# Patient Record
Sex: FEMALE | Race: ASIAN | NOT HISPANIC OR LATINO
[De-identification: names, ages, dates, MRNs, and addresses within clinical notes are randomized per-mention and may not be internally consistent; named-entity substitution may affect disease eponyms.]

---

## 2017-03-03 ENCOUNTER — APPOINTMENT (OUTPATIENT)
Dept: INTERNAL MEDICINE | Facility: CLINIC | Age: 32
End: 2017-03-03

## 2017-05-09 ENCOUNTER — APPOINTMENT (OUTPATIENT)
Dept: INTERNAL MEDICINE | Facility: CLINIC | Age: 32
End: 2017-05-09

## 2017-05-09 VITALS
HEIGHT: 63.5 IN | SYSTOLIC BLOOD PRESSURE: 130 MMHG | DIASTOLIC BLOOD PRESSURE: 80 MMHG | TEMPERATURE: 98.2 F | OXYGEN SATURATION: 98 % | BODY MASS INDEX: 34.65 KG/M2 | HEART RATE: 71 BPM | WEIGHT: 198 LBS

## 2017-05-17 ENCOUNTER — TRANSCRIPTION ENCOUNTER (OUTPATIENT)
Age: 32
End: 2017-05-17

## 2017-05-17 ENCOUNTER — CLINICAL ADVICE (OUTPATIENT)
Age: 32
End: 2017-05-17

## 2017-05-29 LAB
25(OH)D3 SERPL-MCNC: 14.6 NG/ML
ALBUMIN SERPL ELPH-MCNC: 4.4 G/DL
ALP BLD-CCNC: 51 U/L
ALT SERPL-CCNC: 19 U/L
ANION GAP SERPL CALC-SCNC: 15 MMOL/L
AST SERPL-CCNC: 18 U/L
BASOPHILS # BLD AUTO: 0.03 K/UL
BASOPHILS NFR BLD AUTO: 0.3 %
BILIRUB SERPL-MCNC: 0.2 MG/DL
BUN SERPL-MCNC: 15 MG/DL
CALCIUM SERPL-MCNC: 9.8 MG/DL
CHLORIDE SERPL-SCNC: 103 MMOL/L
CHOLEST SERPL-MCNC: 135 MG/DL
CHOLEST/HDLC SERPL: 3 RATIO
CO2 SERPL-SCNC: 24 MMOL/L
CREAT SERPL-MCNC: 0.73 MG/DL
EOSINOPHIL # BLD AUTO: 0.4 K/UL
EOSINOPHIL NFR BLD AUTO: 4 %
GLUCOSE SERPL-MCNC: 90 MG/DL
HBA1C MFR BLD HPLC: 5.6 %
HCT VFR BLD CALC: 38.1 %
HDLC SERPL-MCNC: 47 MG/DL
HGB BLD-MCNC: 12.7 G/DL
IMM GRANULOCYTES NFR BLD AUTO: 0.2 %
LDLC SERPL CALC-MCNC: 75 MG/DL
LYMPHOCYTES # BLD AUTO: 3.94 K/UL
LYMPHOCYTES NFR BLD AUTO: 39.4 %
MAN DIFF?: NORMAL
MCHC RBC-ENTMCNC: 29.8 PG
MCHC RBC-ENTMCNC: 33.3 GM/DL
MCV RBC AUTO: 89.4 FL
MONOCYTES # BLD AUTO: 0.63 K/UL
MONOCYTES NFR BLD AUTO: 6.3 %
NEUTROPHILS # BLD AUTO: 4.97 K/UL
NEUTROPHILS NFR BLD AUTO: 49.8 %
PLATELET # BLD AUTO: 317 K/UL
POTASSIUM SERPL-SCNC: 5.1 MMOL/L
PROT SERPL-MCNC: 7.9 G/DL
RBC # BLD: 4.26 M/UL
RBC # FLD: 13.5 %
SODIUM SERPL-SCNC: 142 MMOL/L
T4 FREE SERPL-MCNC: 1.5 NG/DL
TRIGL SERPL-MCNC: 65 MG/DL
TSH SERPL-ACNC: 7.67 UIU/ML
WBC # FLD AUTO: 9.99 K/UL

## 2017-09-13 LAB
T4 FREE SERPL-MCNC: 1.4 NG/DL
TSH SERPL-ACNC: 2.82 UIU/ML

## 2017-10-13 ENCOUNTER — APPOINTMENT (OUTPATIENT)
Dept: INTERNAL MEDICINE | Facility: CLINIC | Age: 32
End: 2017-10-13
Payer: MEDICAID

## 2017-10-13 VITALS
OXYGEN SATURATION: 98 % | DIASTOLIC BLOOD PRESSURE: 70 MMHG | BODY MASS INDEX: 34.3 KG/M2 | HEIGHT: 63.5 IN | HEART RATE: 74 BPM | TEMPERATURE: 98.1 F | WEIGHT: 196 LBS | SYSTOLIC BLOOD PRESSURE: 125 MMHG

## 2017-10-13 PROCEDURE — 99395 PREV VISIT EST AGE 18-39: CPT | Mod: 25

## 2017-10-13 PROCEDURE — 99213 OFFICE O/P EST LOW 20 MIN: CPT | Mod: 25

## 2017-10-13 PROCEDURE — 36415 COLL VENOUS BLD VENIPUNCTURE: CPT

## 2017-11-14 ENCOUNTER — CLINICAL ADVICE (OUTPATIENT)
Age: 32
End: 2017-11-14

## 2017-11-14 LAB
ANA SER IF-ACNC: NEGATIVE
DEPRECATED CARDIOLIPIN IGA SER: <5 APL
DSDNA AB SER-ACNC: <12 IU/ML
T3FREE SERPL-MCNC: 2.89 PG/ML
T4 FREE SERPL-MCNC: 1.8 NG/DL
TSH SERPL-ACNC: 6.4 UIU/ML

## 2017-12-14 ENCOUNTER — APPOINTMENT (OUTPATIENT)
Dept: ENDOCRINOLOGY | Facility: CLINIC | Age: 32
End: 2017-12-14

## 2018-01-18 ENCOUNTER — MEDICATION RENEWAL (OUTPATIENT)
Age: 33
End: 2018-01-18

## 2018-02-06 ENCOUNTER — MEDICATION RENEWAL (OUTPATIENT)
Age: 33
End: 2018-02-06

## 2018-03-28 ENCOUNTER — EMERGENCY (EMERGENCY)
Facility: HOSPITAL | Age: 33
LOS: 1 days | Discharge: HOME | End: 2018-03-28
Attending: EMERGENCY MEDICINE
Payer: MEDICAID

## 2018-03-28 ENCOUNTER — NON-APPOINTMENT (OUTPATIENT)
Age: 33
End: 2018-03-28

## 2018-03-28 ENCOUNTER — RECORD ABSTRACTING (OUTPATIENT)
Age: 33
End: 2018-03-28

## 2018-03-28 VITALS
OXYGEN SATURATION: 99 % | HEART RATE: 90 BPM | DIASTOLIC BLOOD PRESSURE: 81 MMHG | SYSTOLIC BLOOD PRESSURE: 120 MMHG | RESPIRATION RATE: 16 BRPM

## 2018-03-28 VITALS
WEIGHT: 195.11 LBS | TEMPERATURE: 98 F | HEART RATE: 82 BPM | OXYGEN SATURATION: 98 % | RESPIRATION RATE: 16 BRPM | DIASTOLIC BLOOD PRESSURE: 86 MMHG | HEIGHT: 63 IN | SYSTOLIC BLOOD PRESSURE: 119 MMHG

## 2018-03-28 DIAGNOSIS — Z82.49 FAMILY HISTORY OF ISCHEMIC HEART DISEASE AND OTHER DISEASES OF THE CIRCULATORY SYSTEM: ICD-10-CM

## 2018-03-28 DIAGNOSIS — Z82.3 FAMILY HISTORY OF STROKE: ICD-10-CM

## 2018-03-28 DIAGNOSIS — Z78.9 OTHER SPECIFIED HEALTH STATUS: ICD-10-CM

## 2018-03-28 DIAGNOSIS — Z87.59 PERSONAL HISTORY OF OTHER COMPLICATIONS OF PREGNANCY, CHILDBIRTH AND THE PUERPERIUM: ICD-10-CM

## 2018-03-28 DIAGNOSIS — Z90.49 ACQUIRED ABSENCE OF OTHER SPECIFIED PARTS OF DIGESTIVE TRACT: Chronic | ICD-10-CM

## 2018-03-28 LAB
ANION GAP SERPL CALC-SCNC: 13 MMOL/L — SIGNIFICANT CHANGE UP (ref 5–17)
APPEARANCE UR: CLEAR — SIGNIFICANT CHANGE UP
APTT BLD: 24.7 SEC — LOW (ref 27.5–37.4)
BACTERIA # UR AUTO: ABNORMAL /HPF
BILIRUB UR-MCNC: NEGATIVE — SIGNIFICANT CHANGE UP
BLD GP AB SCN SERPL QL: NEGATIVE — SIGNIFICANT CHANGE UP
BUN SERPL-MCNC: 10 MG/DL — SIGNIFICANT CHANGE UP (ref 7–23)
CALCIUM SERPL-MCNC: 10.2 MG/DL — SIGNIFICANT CHANGE UP (ref 8.4–10.5)
CHLORIDE SERPL-SCNC: 103 MMOL/L — SIGNIFICANT CHANGE UP (ref 96–108)
CO2 SERPL-SCNC: 24 MMOL/L — SIGNIFICANT CHANGE UP (ref 22–31)
COLOR SPEC: YELLOW — SIGNIFICANT CHANGE UP
COMMENT - URINE: SIGNIFICANT CHANGE UP
CREAT SERPL-MCNC: 0.66 MG/DL — SIGNIFICANT CHANGE UP (ref 0.5–1.3)
DIFF PNL FLD: NEGATIVE — SIGNIFICANT CHANGE UP
EPI CELLS # UR: SIGNIFICANT CHANGE UP /HPF
GLUCOSE SERPL-MCNC: 98 MG/DL — SIGNIFICANT CHANGE UP (ref 70–99)
GLUCOSE UR QL: NEGATIVE — SIGNIFICANT CHANGE UP
HCG SERPL-ACNC: 7656 MIU/ML — HIGH (ref 5–24)
HCG UR QL: POSITIVE
HCT VFR BLD CALC: 39.5 % — SIGNIFICANT CHANGE UP (ref 34.5–45)
HGB BLD-MCNC: 13.2 G/DL — SIGNIFICANT CHANGE UP (ref 11.5–15.5)
INR BLD: 1.09 RATIO — SIGNIFICANT CHANGE UP (ref 0.88–1.16)
KETONES UR-MCNC: NEGATIVE — SIGNIFICANT CHANGE UP
LEUKOCYTE ESTERASE UR-ACNC: ABNORMAL
MCHC RBC-ENTMCNC: 30.3 PG — SIGNIFICANT CHANGE UP (ref 27–34)
MCHC RBC-ENTMCNC: 33.5 GM/DL — SIGNIFICANT CHANGE UP (ref 32–36)
MCV RBC AUTO: 90.5 FL — SIGNIFICANT CHANGE UP (ref 80–100)
NITRITE UR-MCNC: NEGATIVE — SIGNIFICANT CHANGE UP
PH UR: 6 — SIGNIFICANT CHANGE UP (ref 5–8)
PLATELET # BLD AUTO: 306 K/UL — SIGNIFICANT CHANGE UP (ref 150–400)
POTASSIUM SERPL-MCNC: 5 MMOL/L — SIGNIFICANT CHANGE UP (ref 3.5–5.3)
POTASSIUM SERPL-SCNC: 5 MMOL/L — SIGNIFICANT CHANGE UP (ref 3.5–5.3)
PROT UR-MCNC: NEGATIVE — SIGNIFICANT CHANGE UP
PROTHROM AB SERPL-ACNC: 11.8 SEC — SIGNIFICANT CHANGE UP (ref 9.8–12.7)
RBC # BLD: 4.36 M/UL — SIGNIFICANT CHANGE UP (ref 3.8–5.2)
RBC # FLD: 11.7 % — SIGNIFICANT CHANGE UP (ref 10.3–14.5)
RBC CASTS # UR COMP ASSIST: SIGNIFICANT CHANGE UP /HPF (ref 0–2)
RH IG SCN BLD-IMP: POSITIVE — SIGNIFICANT CHANGE UP
RH IG SCN BLD-IMP: POSITIVE — SIGNIFICANT CHANGE UP
SODIUM SERPL-SCNC: 140 MMOL/L — SIGNIFICANT CHANGE UP (ref 135–145)
SP GR SPEC: 1.02 — SIGNIFICANT CHANGE UP (ref 1.01–1.02)
UROBILINOGEN FLD QL: NEGATIVE — SIGNIFICANT CHANGE UP
WBC # BLD: 11.2 K/UL — HIGH (ref 3.8–10.5)
WBC # FLD AUTO: 11.2 K/UL — HIGH (ref 3.8–10.5)
WBC UR QL: SIGNIFICANT CHANGE UP /HPF (ref 0–5)

## 2018-03-28 PROCEDURE — 86901 BLOOD TYPING SEROLOGIC RH(D): CPT

## 2018-03-28 PROCEDURE — 85027 COMPLETE CBC AUTOMATED: CPT

## 2018-03-28 PROCEDURE — 76817 TRANSVAGINAL US OBSTETRIC: CPT | Mod: 26

## 2018-03-28 PROCEDURE — 87086 URINE CULTURE/COLONY COUNT: CPT

## 2018-03-28 PROCEDURE — 81001 URINALYSIS AUTO W/SCOPE: CPT

## 2018-03-28 PROCEDURE — 81025 URINE PREGNANCY TEST: CPT

## 2018-03-28 PROCEDURE — 85730 THROMBOPLASTIN TIME PARTIAL: CPT

## 2018-03-28 PROCEDURE — 86850 RBC ANTIBODY SCREEN: CPT

## 2018-03-28 PROCEDURE — 86900 BLOOD TYPING SEROLOGIC ABO: CPT

## 2018-03-28 PROCEDURE — 85610 PROTHROMBIN TIME: CPT

## 2018-03-28 PROCEDURE — 99284 EMERGENCY DEPT VISIT MOD MDM: CPT | Mod: 25

## 2018-03-28 PROCEDURE — 80048 BASIC METABOLIC PNL TOTAL CA: CPT

## 2018-03-28 PROCEDURE — 84702 CHORIONIC GONADOTROPIN TEST: CPT

## 2018-03-28 PROCEDURE — 99284 EMERGENCY DEPT VISIT MOD MDM: CPT

## 2018-03-28 PROCEDURE — 76817 TRANSVAGINAL US OBSTETRIC: CPT

## 2018-03-28 RX ORDER — NITROFURANTOIN MACROCRYSTAL 50 MG
1 CAPSULE ORAL
Qty: 6 | Refills: 0 | OUTPATIENT
Start: 2018-03-28 | End: 2018-03-30

## 2018-03-28 NOTE — ED ADULT NURSE NOTE - OBJECTIVE STATEMENT
33y/o female , history of ectopic pregnancies and hypothyroid, walked into ED a&ox3 c/o abdominal pain. Patient reports LMP 18, states she had positive home pregnancy test and believes she is about 5 weeks pregnant. States for the past week she has been having intermittent right lower abdominal pain, described as sharp/stabbing, Reports some spotting on Saturday but none since. Coming in because she has not yet visited OBGYN for US due to insurance issues and she believes she may be having another ectopic pregnancy. Denies fever, SOB, CP, palpitations, back pain, current vaginal bleeding, urinary symptoms. Lungs clear b/l. Abd soft, +tenderness to RLQ. Awaiting MD chavez. Safety and comfort maintained.

## 2018-03-28 NOTE — ED ADULT TRIAGE NOTE - CHIEF COMPLAINT QUOTE
rlq pain intermittently x 3 days; 5 weeks pregnant; has not seen ob gyn yet due to insurance issues; hx of ectopic pregnancies;

## 2018-03-28 NOTE — ED PROVIDER NOTE - OBJECTIVE STATEMENT
32F w/PMHx hypothyroid, recurrent 32F w/PMHx hypothyroid, recurrent spontaneous abortions, ectopic pregnancy, now 5 weeks pregnant c/o RLQ pain x2d. Pt reports h/o 12 pregnancies w/only one live birth due to unclear etiology (neg STEPHEN, antiphospholipid) as well as 1 ectopic preg 1y ago. Pt reports +urine preg 2 weeks ago (LMP 2/16). Saw gyn in past who told pt to take progesterone w/next preg (which she has been taking). Has been doing well (did have some mild spotting 1 week ago), but has been unable to see obgyn due to insurance issues, has not had official U/S. Pt developed sudden onset, sharp RLQ pain radiating to back 2d ago, intermittent w/o clear modifying/relieving factors. Denies assoc F/C, N/V/D/other change in BM, vaginal bleeding, unusual vaginal d/c, dysuria, or other factors. Pt denies prev experience w/similar sx (was asx w/prev ectopic, only noted on routine u/s), but is concerned it may be another ectopic. Sexually active w/ only, feels safe at home.

## 2018-03-28 NOTE — ED PROVIDER NOTE - ATTENDING CONTRIBUTION TO CARE
Attending MD Jeffery:  I personally have seen and examined this patient.  Resident note reviewed and agree on plan of care and except where noted.  See MDM for details.

## 2018-03-28 NOTE — ED PROVIDER NOTE - MEDICAL DECISION MAKING DETAILS
Jose D HAHN: 33 y/o female  with hx of Hypothyroidism and recurrent miscarriages here with RLQ pain and + home pregnancy test. Patient reports one day of mild RLQ pain described as dull and nonradiating. Denies N/V/D, melena, vag bleeding, vag D/C, urinary symptoms, back pain or skin changes, Exam shows a well appearing female with abd soft and nontender and nondistended. No CVA tenderness. Normal skin and no LE edema. Consider Ectopic pregnancy vs UTI vs Pyelo vs IUP. Plan CBC, CMP, HCG, UA and TVUS. Reassess.

## 2018-03-29 LAB
CULTURE RESULTS: NO GROWTH — SIGNIFICANT CHANGE UP
SPECIMEN SOURCE: SIGNIFICANT CHANGE UP

## 2018-03-30 ENCOUNTER — CLINICAL ADVICE (OUTPATIENT)
Age: 33
End: 2018-03-30

## 2018-04-01 ENCOUNTER — OUTPATIENT (OUTPATIENT)
Dept: OUTPATIENT SERVICES | Facility: HOSPITAL | Age: 33
LOS: 1 days | End: 2018-04-01
Payer: MEDICAID

## 2018-04-01 DIAGNOSIS — Z90.49 ACQUIRED ABSENCE OF OTHER SPECIFIED PARTS OF DIGESTIVE TRACT: Chronic | ICD-10-CM

## 2018-04-01 PROCEDURE — G9001: CPT

## 2018-04-04 ENCOUNTER — NON-APPOINTMENT (OUTPATIENT)
Age: 33
End: 2018-04-04

## 2018-04-14 ENCOUNTER — EMERGENCY (EMERGENCY)
Facility: HOSPITAL | Age: 33
LOS: 1 days | Discharge: HOME | End: 2018-04-14
Attending: EMERGENCY MEDICINE
Payer: MEDICAID

## 2018-04-14 VITALS
RESPIRATION RATE: 18 BRPM | SYSTOLIC BLOOD PRESSURE: 108 MMHG | DIASTOLIC BLOOD PRESSURE: 70 MMHG | OXYGEN SATURATION: 97 % | HEART RATE: 93 BPM | TEMPERATURE: 98 F

## 2018-04-14 VITALS
OXYGEN SATURATION: 98 % | HEART RATE: 113 BPM | DIASTOLIC BLOOD PRESSURE: 75 MMHG | RESPIRATION RATE: 20 BRPM | TEMPERATURE: 98 F | SYSTOLIC BLOOD PRESSURE: 120 MMHG

## 2018-04-14 DIAGNOSIS — Z90.49 ACQUIRED ABSENCE OF OTHER SPECIFIED PARTS OF DIGESTIVE TRACT: Chronic | ICD-10-CM

## 2018-04-14 LAB
ALBUMIN SERPL ELPH-MCNC: 3.8 G/DL — SIGNIFICANT CHANGE UP (ref 3.3–5)
ALP SERPL-CCNC: 44 U/L — SIGNIFICANT CHANGE UP (ref 40–120)
ALT FLD-CCNC: 16 U/L RC — SIGNIFICANT CHANGE UP (ref 10–45)
ANION GAP SERPL CALC-SCNC: 14 MMOL/L — SIGNIFICANT CHANGE UP (ref 5–17)
APPEARANCE UR: ABNORMAL
AST SERPL-CCNC: 11 U/L — SIGNIFICANT CHANGE UP (ref 10–40)
BACTERIA # UR AUTO: ABNORMAL /HPF
BASOPHILS # BLD AUTO: 0 K/UL — SIGNIFICANT CHANGE UP (ref 0–0.2)
BASOPHILS NFR BLD AUTO: 0.4 % — SIGNIFICANT CHANGE UP (ref 0–2)
BILIRUB SERPL-MCNC: 0.2 MG/DL — SIGNIFICANT CHANGE UP (ref 0.2–1.2)
BILIRUB UR-MCNC: NEGATIVE — SIGNIFICANT CHANGE UP
BLD GP AB SCN SERPL QL: NEGATIVE — SIGNIFICANT CHANGE UP
BUN SERPL-MCNC: 8 MG/DL — SIGNIFICANT CHANGE UP (ref 7–23)
CALCIUM SERPL-MCNC: 9.6 MG/DL — SIGNIFICANT CHANGE UP (ref 8.4–10.5)
CHLORIDE SERPL-SCNC: 100 MMOL/L — SIGNIFICANT CHANGE UP (ref 96–108)
CO2 SERPL-SCNC: 23 MMOL/L — SIGNIFICANT CHANGE UP (ref 22–31)
COLOR SPEC: YELLOW — SIGNIFICANT CHANGE UP
CREAT SERPL-MCNC: 0.59 MG/DL — SIGNIFICANT CHANGE UP (ref 0.5–1.3)
DIFF PNL FLD: NEGATIVE — SIGNIFICANT CHANGE UP
EOSINOPHIL # BLD AUTO: 0.1 K/UL — SIGNIFICANT CHANGE UP (ref 0–0.5)
EOSINOPHIL NFR BLD AUTO: 1.4 % — SIGNIFICANT CHANGE UP (ref 0–6)
EPI CELLS # UR: SIGNIFICANT CHANGE UP /HPF
GLUCOSE SERPL-MCNC: 91 MG/DL — SIGNIFICANT CHANGE UP (ref 70–99)
GLUCOSE UR QL: NEGATIVE — SIGNIFICANT CHANGE UP
HCG SERPL-ACNC: HIGH MIU/ML (ref 5–24)
HCT VFR BLD CALC: 36.8 % — SIGNIFICANT CHANGE UP (ref 34.5–45)
HGB BLD-MCNC: 12.7 G/DL — SIGNIFICANT CHANGE UP (ref 11.5–15.5)
KETONES UR-MCNC: ABNORMAL
LEUKOCYTE ESTERASE UR-ACNC: NEGATIVE — SIGNIFICANT CHANGE UP
LYMPHOCYTES # BLD AUTO: 1.7 K/UL — SIGNIFICANT CHANGE UP (ref 1–3.3)
LYMPHOCYTES # BLD AUTO: 21 % — SIGNIFICANT CHANGE UP (ref 13–44)
MCHC RBC-ENTMCNC: 30.6 PG — SIGNIFICANT CHANGE UP (ref 27–34)
MCHC RBC-ENTMCNC: 34.5 GM/DL — SIGNIFICANT CHANGE UP (ref 32–36)
MCV RBC AUTO: 88.7 FL — SIGNIFICANT CHANGE UP (ref 80–100)
MONOCYTES # BLD AUTO: 0.9 K/UL — SIGNIFICANT CHANGE UP (ref 0–0.9)
MONOCYTES NFR BLD AUTO: 11.6 % — SIGNIFICANT CHANGE UP (ref 2–14)
NEUTROPHILS # BLD AUTO: 5.2 K/UL — SIGNIFICANT CHANGE UP (ref 1.8–7.4)
NEUTROPHILS NFR BLD AUTO: 65.6 % — SIGNIFICANT CHANGE UP (ref 43–77)
NITRITE UR-MCNC: NEGATIVE — SIGNIFICANT CHANGE UP
PH UR: 6.5 — SIGNIFICANT CHANGE UP (ref 5–8)
PLATELET # BLD AUTO: 299 K/UL — SIGNIFICANT CHANGE UP (ref 150–400)
POTASSIUM SERPL-MCNC: 3.9 MMOL/L — SIGNIFICANT CHANGE UP (ref 3.5–5.3)
POTASSIUM SERPL-SCNC: 3.9 MMOL/L — SIGNIFICANT CHANGE UP (ref 3.5–5.3)
PROT SERPL-MCNC: 7.6 G/DL — SIGNIFICANT CHANGE UP (ref 6–8.3)
PROT UR-MCNC: NEGATIVE — SIGNIFICANT CHANGE UP
RBC # BLD: 4.15 M/UL — SIGNIFICANT CHANGE UP (ref 3.8–5.2)
RBC # FLD: 11.6 % — SIGNIFICANT CHANGE UP (ref 10.3–14.5)
RBC CASTS # UR COMP ASSIST: ABNORMAL /HPF (ref 0–2)
RH IG SCN BLD-IMP: POSITIVE — SIGNIFICANT CHANGE UP
SODIUM SERPL-SCNC: 137 MMOL/L — SIGNIFICANT CHANGE UP (ref 135–145)
SP GR SPEC: 1.02 — SIGNIFICANT CHANGE UP (ref 1.01–1.02)
UROBILINOGEN FLD QL: NEGATIVE — SIGNIFICANT CHANGE UP
WBC # BLD: 7.9 K/UL — SIGNIFICANT CHANGE UP (ref 3.8–10.5)
WBC # FLD AUTO: 7.9 K/UL — SIGNIFICANT CHANGE UP (ref 3.8–10.5)
WBC UR QL: SIGNIFICANT CHANGE UP /HPF (ref 0–5)

## 2018-04-14 PROCEDURE — 85027 COMPLETE CBC AUTOMATED: CPT

## 2018-04-14 PROCEDURE — 76815 OB US LIMITED FETUS(S): CPT | Mod: 26

## 2018-04-14 PROCEDURE — 99284 EMERGENCY DEPT VISIT MOD MDM: CPT | Mod: 25

## 2018-04-14 PROCEDURE — 76815 OB US LIMITED FETUS(S): CPT

## 2018-04-14 PROCEDURE — 76817 TRANSVAGINAL US OBSTETRIC: CPT | Mod: 26

## 2018-04-14 PROCEDURE — 81001 URINALYSIS AUTO W/SCOPE: CPT

## 2018-04-14 PROCEDURE — 87086 URINE CULTURE/COLONY COUNT: CPT

## 2018-04-14 PROCEDURE — 86850 RBC ANTIBODY SCREEN: CPT

## 2018-04-14 PROCEDURE — 84702 CHORIONIC GONADOTROPIN TEST: CPT

## 2018-04-14 PROCEDURE — 86901 BLOOD TYPING SEROLOGIC RH(D): CPT

## 2018-04-14 PROCEDURE — 80053 COMPREHEN METABOLIC PANEL: CPT

## 2018-04-14 PROCEDURE — 86900 BLOOD TYPING SEROLOGIC ABO: CPT

## 2018-04-14 PROCEDURE — 96374 THER/PROPH/DIAG INJ IV PUSH: CPT

## 2018-04-14 PROCEDURE — 96375 TX/PRO/DX INJ NEW DRUG ADDON: CPT

## 2018-04-14 PROCEDURE — 76830 TRANSVAGINAL US NON-OB: CPT

## 2018-04-14 RX ORDER — ACETAMINOPHEN 500 MG
1000 TABLET ORAL ONCE
Qty: 0 | Refills: 0 | Status: COMPLETED | OUTPATIENT
Start: 2018-04-14 | End: 2018-04-14

## 2018-04-14 RX ORDER — METOCLOPRAMIDE HCL 10 MG
10 TABLET ORAL ONCE
Qty: 0 | Refills: 0 | Status: COMPLETED | OUTPATIENT
Start: 2018-04-14 | End: 2018-04-14

## 2018-04-14 RX ORDER — SODIUM CHLORIDE 9 MG/ML
1000 INJECTION INTRAMUSCULAR; INTRAVENOUS; SUBCUTANEOUS ONCE
Qty: 0 | Refills: 0 | Status: COMPLETED | OUTPATIENT
Start: 2018-04-14 | End: 2018-04-14

## 2018-04-14 RX ADMIN — Medication 1000 MILLIGRAM(S): at 05:19

## 2018-04-14 RX ADMIN — Medication 10 MILLIGRAM(S): at 04:47

## 2018-04-14 RX ADMIN — Medication 400 MILLIGRAM(S): at 04:49

## 2018-04-14 RX ADMIN — SODIUM CHLORIDE 1333.33 MILLILITER(S): 9 INJECTION INTRAMUSCULAR; INTRAVENOUS; SUBCUTANEOUS at 04:45

## 2018-04-14 NOTE — ED PROVIDER NOTE - ATTENDING CONTRIBUTION TO CARE
31 y/o female with the above documented history and HPI who on exam appears well and comfortable. VSs noted, sclerae anicteric, MM's moist, neck supple, lungs CTA, cardiac sounds s/ audible m/r/g, abdomen soft, NT/ND, extremities s/ asymmetry, back s/ ttp, skin s/ rash and neurologically intact. There is nothing clinically evident to suggest any acute or emergent process despite the similarity to her prior miscarriages and with an already established IUP via US. Beyond OB, there is also nothing clinically evident to suggest her pain is due to any other emergent issue, be it vascular, GI, , cord compromise/neurologic, etc. We will however obtain screening labs and US in light of her history. If unremarkable, I anticipate DC.

## 2018-04-14 NOTE — ED ADULT NURSE REASSESSMENT NOTE - NS ED NURSE REASSESS COMMENT FT1
report received from night MANDO Mane. Pt found in semi-maher position, non-labored respirations. Pt currently denies pain or discomfort. VS documented, pt a+ox3; pt transferred to ultrasound for imaging. Will reassess upon return to unit. report received from night MANDO Mane. Pt found in semi-maher position, non-labored respirations. Pt currently denies pain or discomfort. VS documented MD aware, pt a+ox3; pt transferred to ultrasound for imaging. Will reassess upon return to unit.

## 2018-04-14 NOTE — ED PROVIDER NOTE - MEDICAL DECISION MAKING DETAILS
32F [id number] presenting 8 weeks pregnant based on home uhcg and lmp. Currently presenting with sx of previous abortions, concerning for ectopic 32F [id number] presenting 8 weeks pregnant based on home uhcg and lmp. Currently presenting with sx of previous abortions, concerning for ectopic vs spont , at this time with stable vitals. Labs, TVUS, f/u studies, reassess, dispo.

## 2018-04-14 NOTE — ED PROVIDER NOTE - PLAN OF CARE
1) Please follow-up with your OBGYN doctor.  If you cannot follow-up with your doctor(s), please return to the ED for any urgent issues.  2) If you have any worsening of symptoms or any other concerns please return to the ED immediately.  3) Please continue taking your home medications as directed.  4) You may have been given a copy of your labs and/or imaging.  Please go over these with your primary care doctor.

## 2018-04-14 NOTE — ED PROVIDER NOTE - OBJECTIVE STATEMENT
[id number] w/ hx of 1x ectopic pregnancy, ~9 spontaneous abortions, with negative genetics w/u and 1 vaginal birth presenting 8 weeks pregnant with complaint of low back pain with radiation down bilateral legs and abdominal pain with headache and chills. Pt notes that with previous abortions she had similar symptoms of low back pain, but did not have headache or leg pain. Previous ectopic was in right fallopian tube. Currently does not have an OB/GYN due to insurance issues, has been *** [id number] w/ hx of 1x ectopic pregnancy, ~9 spontaneous abortions, with negative genetics w/u and 1 vaginal birth presenting 8 weeks pregnant with complaint of low back pain with radiation down bilateral legs and abdominal pain with headache and chills. Pt notes that with previous abortions she had similar symptoms of low back pain, but did not have headache or leg pain. Previous ectopic was in right fallopian tube. Currently does not have an OB/GYN due to insurance issues, has been not yet been evaluated.

## 2018-04-14 NOTE — ED ADULT NURSE NOTE - CHPI ED SYMPTOMS NEG
no numbness/no anorexia/no bowel dysfunction/no constipation/no bladder dysfunction/no difficulty bearing weight/no fatigue/no tingling/no motor function loss

## 2018-04-14 NOTE — ED PROVIDER NOTE - CARE PLAN
Principal Discharge DX:	Fibroids  Assessment and plan of treatment:	1) Please follow-up with your OBGYN doctor.  If you cannot follow-up with your doctor(s), please return to the ED for any urgent issues.  2) If you have any worsening of symptoms or any other concerns please return to the ED immediately.  3) Please continue taking your home medications as directed.  4) You may have been given a copy of your labs and/or imaging.  Please go over these with your primary care doctor.

## 2018-04-14 NOTE — ED PROVIDER NOTE - PHYSICAL EXAMINATION
Gen: NAD, non-toxic, conversational  Eyes: PERRL, EOMI   HENT: Normocephalic, atraumatic. External ears normal, no rhinorrhea, moist mucous membranes.   CV: RRR, no M/R/G  Resp: CTAB, non-labored, speaking without difficulty on room air  Abd: soft, non tender, non rigid, no guarding or rebound tenderness  Back: No CVAT bilaterally, no midline ttp, negative straight leg raise.   Skin: dry, wwp   Neuro: AOx3, speech is fluent and appropriate  Psych: Mood concerned, affect euthymic Gen: NAD, non-toxic, conversational  Eyes: PERRL, EOMI   HENT: Normocephalic, atraumatic. External ears normal, no rhinorrhea, moist mucous membranes.   CV: RRR, no M/R/G  Resp: CTAB, non-labored, speaking without difficulty on room air  Abd: soft, non tender, non rigid, no guarding or rebound tenderness  Back: No CVAT bilaterally, no midline ttp, negative straight leg raise ble  Skin: dry, wwp   Neuro: AOx3, speech is fluent and appropriate  Psych: Mood concerned, affect euthymic

## 2018-04-14 NOTE — ED PROVIDER NOTE - NS ED ROS FT
General: No fevers, +chills  HENT: No ear pain, runny nose, or sore throat  Eyes: No visual changes  CP: No chest pain, palpitations, or light headedness  Resp: No shortness of breath, no cough  GI: No abdominal pain, diarrhea, constipation, nausea, or vomiting  : No dysuria or hematuria  Neuro: No numbness, + tingling ble, no weakness  Endo: No hx of diabetes  Heme: No hx of easy bleeding or bruising

## 2018-04-14 NOTE — ED PROVIDER NOTE - PROGRESS NOTE DETAILS
Pt is feeling much better and is agreeable to f/u w/ her OBGYN. Pt is aware of her single IUP ultrasound result.

## 2018-04-15 ENCOUNTER — LABORATORY RESULT (OUTPATIENT)
Age: 33
End: 2018-04-15

## 2018-04-15 LAB
CULTURE RESULTS: SIGNIFICANT CHANGE UP
SPECIMEN SOURCE: SIGNIFICANT CHANGE UP

## 2018-04-16 ENCOUNTER — LABORATORY RESULT (OUTPATIENT)
Age: 33
End: 2018-04-16

## 2018-04-16 ENCOUNTER — APPOINTMENT (OUTPATIENT)
Dept: MATERNAL FETAL MEDICINE | Facility: CLINIC | Age: 33
End: 2018-04-16
Payer: MEDICAID

## 2018-04-16 ENCOUNTER — OUTPATIENT (OUTPATIENT)
Dept: OUTPATIENT SERVICES | Facility: HOSPITAL | Age: 33
LOS: 1 days | End: 2018-04-16
Payer: MEDICAID

## 2018-04-16 ENCOUNTER — APPOINTMENT (OUTPATIENT)
Dept: MATERNAL FETAL MEDICINE | Facility: CLINIC | Age: 33
End: 2018-04-16

## 2018-04-16 ENCOUNTER — ASOB RESULT (OUTPATIENT)
Age: 33
End: 2018-04-16

## 2018-04-16 ENCOUNTER — NON-APPOINTMENT (OUTPATIENT)
Age: 33
End: 2018-04-16

## 2018-04-16 ENCOUNTER — APPOINTMENT (OUTPATIENT)
Dept: ANTEPARTUM | Facility: CLINIC | Age: 33
End: 2018-04-16
Payer: MEDICAID

## 2018-04-16 VITALS
HEIGHT: 63.5 IN | SYSTOLIC BLOOD PRESSURE: 100 MMHG | BODY MASS INDEX: 33.95 KG/M2 | WEIGHT: 194 LBS | DIASTOLIC BLOOD PRESSURE: 60 MMHG

## 2018-04-16 DIAGNOSIS — Z90.49 ACQUIRED ABSENCE OF OTHER SPECIFIED PARTS OF DIGESTIVE TRACT: Chronic | ICD-10-CM

## 2018-04-16 DIAGNOSIS — O09.899 SUPERVISION OF OTHER HIGH RISK PREGNANCIES, UNSPECIFIED TRIMESTER: ICD-10-CM

## 2018-04-16 LAB
BILIRUB UR QL STRIP: NEGATIVE
COLLECTION METHOD: NORMAL
GLUCOSE UR-MCNC: NEGATIVE
HCG UR QL: 0.2 EU/DL
HGB UR QL STRIP.AUTO: NEGATIVE
KETONES UR-MCNC: NEGATIVE
LEUKOCYTE ESTERASE UR QL STRIP: NEGATIVE
NITRITE UR QL STRIP: NEGATIVE
PH UR STRIP: 6
PROT UR STRIP-MCNC: NEGATIVE
SP GR UR STRIP: 1.01

## 2018-04-16 PROCEDURE — 76801 OB US < 14 WKS SINGLE FETUS: CPT | Mod: 26

## 2018-04-16 PROCEDURE — 81003 URINALYSIS AUTO W/O SCOPE: CPT | Mod: QW,NC

## 2018-04-16 PROCEDURE — 86480 TB TEST CELL IMMUN MEASURE: CPT

## 2018-04-16 PROCEDURE — 99203 OFFICE O/P NEW LOW 30 MIN: CPT | Mod: 25,GC

## 2018-04-16 PROCEDURE — 76801 OB US < 14 WKS SINGLE FETUS: CPT

## 2018-04-16 PROCEDURE — 81003 URINALYSIS AUTO W/O SCOPE: CPT

## 2018-04-16 PROCEDURE — G0463: CPT

## 2018-04-18 LAB
M TB TUBERC IFN-G BLD QL: 0 IU/ML — SIGNIFICANT CHANGE UP
M TB TUBERC IFN-G BLD QL: 0.04 IU/ML — SIGNIFICANT CHANGE UP
M TB TUBERC IFN-G BLD QL: NEGATIVE — SIGNIFICANT CHANGE UP
MITOGEN IGNF BCKGRD COR BLD-ACNC: >10 IU/ML — SIGNIFICANT CHANGE UP

## 2018-04-24 DIAGNOSIS — R69 ILLNESS, UNSPECIFIED: ICD-10-CM

## 2018-04-30 DIAGNOSIS — O99.281 ENDOCRINE, NUTRITIONAL AND METABOLIC DISEASES COMPLICATING PREGNANCY, FIRST TRIMESTER: ICD-10-CM

## 2018-04-30 DIAGNOSIS — O99.211 OBESITY COMPLICATING PREGNANCY, FIRST TRIMESTER: ICD-10-CM

## 2018-04-30 DIAGNOSIS — O26.21 PREGNANCY CARE FOR PATIENT WITH RECURRENT PREGNANCY LOSS, FIRST TRIMESTER: ICD-10-CM

## 2018-04-30 DIAGNOSIS — Z3A.08 8 WEEKS GESTATION OF PREGNANCY: ICD-10-CM

## 2018-05-14 ENCOUNTER — NON-APPOINTMENT (OUTPATIENT)
Age: 33
End: 2018-05-14

## 2018-05-14 ENCOUNTER — ASOB RESULT (OUTPATIENT)
Age: 33
End: 2018-05-14

## 2018-05-14 ENCOUNTER — LABORATORY RESULT (OUTPATIENT)
Age: 33
End: 2018-05-14

## 2018-05-14 ENCOUNTER — APPOINTMENT (OUTPATIENT)
Dept: ANTEPARTUM | Facility: CLINIC | Age: 33
End: 2018-05-14
Payer: MEDICAID

## 2018-05-14 ENCOUNTER — APPOINTMENT (OUTPATIENT)
Dept: MATERNAL FETAL MEDICINE | Facility: CLINIC | Age: 33
End: 2018-05-14
Payer: MEDICAID

## 2018-05-14 ENCOUNTER — OUTPATIENT (OUTPATIENT)
Dept: OUTPATIENT SERVICES | Facility: HOSPITAL | Age: 33
LOS: 1 days | End: 2018-05-14
Payer: MEDICAID

## 2018-05-14 VITALS
SYSTOLIC BLOOD PRESSURE: 112 MMHG | BODY MASS INDEX: 33.07 KG/M2 | WEIGHT: 189 LBS | HEIGHT: 63.5 IN | DIASTOLIC BLOOD PRESSURE: 62 MMHG

## 2018-05-14 DIAGNOSIS — Z90.49 ACQUIRED ABSENCE OF OTHER SPECIFIED PARTS OF DIGESTIVE TRACT: Chronic | ICD-10-CM

## 2018-05-14 DIAGNOSIS — O09.899 SUPERVISION OF OTHER HIGH RISK PREGNANCIES, UNSPECIFIED TRIMESTER: ICD-10-CM

## 2018-05-14 LAB
BILIRUB UR QL STRIP: NEGATIVE
COLLECTION METHOD: NORMAL
GLUCOSE UR-MCNC: NEGATIVE
HCG UR QL: 0.2 EU/DL
HGB UR QL STRIP.AUTO: NEGATIVE
KETONES UR-MCNC: NEGATIVE
LEUKOCYTE ESTERASE UR QL STRIP: NEGATIVE
NITRITE UR QL STRIP: NEGATIVE
PH UR STRIP: 6.5
PROT UR STRIP-MCNC: NEGATIVE
SP GR UR STRIP: 1.01

## 2018-05-14 PROCEDURE — 76813 OB US NUCHAL MEAS 1 GEST: CPT | Mod: 26

## 2018-05-14 PROCEDURE — 81003 URINALYSIS AUTO W/O SCOPE: CPT | Mod: QW,NC

## 2018-05-14 PROCEDURE — 99213 OFFICE O/P EST LOW 20 MIN: CPT | Mod: 25,GE

## 2018-05-14 PROCEDURE — 36416 COLLJ CAPILLARY BLOOD SPEC: CPT

## 2018-05-15 LAB — GLUCOSE 1H P MEAL SERPL-MCNC: 87 MG/DL — SIGNIFICANT CHANGE UP (ref 70–134)

## 2018-05-15 PROCEDURE — 84704 HCG FREE BETACHAIN TEST: CPT

## 2018-05-15 PROCEDURE — 76813 OB US NUCHAL MEAS 1 GEST: CPT

## 2018-05-15 PROCEDURE — 81003 URINALYSIS AUTO W/O SCOPE: CPT

## 2018-05-15 PROCEDURE — 82950 GLUCOSE TEST: CPT

## 2018-05-15 PROCEDURE — G0463: CPT

## 2018-05-16 LAB
1ST TRIMESTER DATA: SIGNIFICANT CHANGE UP
ADDENDUM DOC: SIGNIFICANT CHANGE UP
AFP AMN-MCNC: SIGNIFICANT CHANGE UP
B-HCG FREE SERPL-MCNC: SIGNIFICANT CHANGE UP
CLINICAL BIOCHEMIST REVIEW: SIGNIFICANT CHANGE UP
CLINICAL BIOCHEMIST REVIEW: SIGNIFICANT CHANGE UP
DEMOGRAPHIC DATA: SIGNIFICANT CHANGE UP
NT: SIGNIFICANT CHANGE UP
PAPP-A SERPL-ACNC: SIGNIFICANT CHANGE UP
SCREEN-FOOTER: SIGNIFICANT CHANGE UP
SCREEN-RECOMMENDATIONS: SIGNIFICANT CHANGE UP

## 2018-05-29 DIAGNOSIS — O09.92 SUPERVISION OF HIGH RISK PREGNANCY, UNSPECIFIED, SECOND TRIMESTER: ICD-10-CM

## 2018-05-29 DIAGNOSIS — E03.9 HYPOTHYROIDISM, UNSPECIFIED: ICD-10-CM

## 2018-05-29 DIAGNOSIS — Z86.32 PERSONAL HISTORY OF GESTATIONAL DIABETES: ICD-10-CM

## 2018-06-11 ENCOUNTER — LABORATORY RESULT (OUTPATIENT)
Age: 33
End: 2018-06-11

## 2018-06-11 ENCOUNTER — NON-APPOINTMENT (OUTPATIENT)
Age: 33
End: 2018-06-11

## 2018-06-11 ENCOUNTER — APPOINTMENT (OUTPATIENT)
Dept: MATERNAL FETAL MEDICINE | Facility: CLINIC | Age: 33
End: 2018-06-11
Payer: MEDICAID

## 2018-06-11 ENCOUNTER — ASOB RESULT (OUTPATIENT)
Age: 33
End: 2018-06-11

## 2018-06-11 ENCOUNTER — APPOINTMENT (OUTPATIENT)
Dept: ANTEPARTUM | Facility: CLINIC | Age: 33
End: 2018-06-11
Payer: MEDICAID

## 2018-06-11 ENCOUNTER — OUTPATIENT (OUTPATIENT)
Dept: OUTPATIENT SERVICES | Facility: HOSPITAL | Age: 33
LOS: 1 days | End: 2018-06-11
Payer: MEDICAID

## 2018-06-11 VITALS
HEIGHT: 63.5 IN | BODY MASS INDEX: 34.65 KG/M2 | WEIGHT: 198 LBS | SYSTOLIC BLOOD PRESSURE: 110 MMHG | DIASTOLIC BLOOD PRESSURE: 70 MMHG

## 2018-06-11 DIAGNOSIS — Z90.49 ACQUIRED ABSENCE OF OTHER SPECIFIED PARTS OF DIGESTIVE TRACT: Chronic | ICD-10-CM

## 2018-06-11 DIAGNOSIS — O09.899 SUPERVISION OF OTHER HIGH RISK PREGNANCIES, UNSPECIFIED TRIMESTER: ICD-10-CM

## 2018-06-11 LAB
BILIRUB UR QL STRIP: NEGATIVE
COLLECTION METHOD: NORMAL
GLUCOSE UR-MCNC: NEGATIVE
HCG UR QL: 0.2 EU/DL
HGB UR QL STRIP.AUTO: NEGATIVE
KETONES UR-MCNC: NEGATIVE
LEUKOCYTE ESTERASE UR QL STRIP: NORMAL
NITRITE UR QL STRIP: NEGATIVE
PH UR STRIP: 6.5
PROT UR STRIP-MCNC: NEGATIVE
SP GR UR STRIP: 1.01

## 2018-06-11 PROCEDURE — 76805 OB US >/= 14 WKS SNGL FETUS: CPT | Mod: 26

## 2018-06-11 PROCEDURE — 81003 URINALYSIS AUTO W/O SCOPE: CPT | Mod: NC,QW

## 2018-06-15 ENCOUNTER — RESULT REVIEW (OUTPATIENT)
Age: 33
End: 2018-06-15

## 2018-06-19 DIAGNOSIS — O26.22 PREGNANCY CARE FOR PATIENT WITH RECURRENT PREGNANCY LOSS, SECOND TRIMESTER: ICD-10-CM

## 2018-06-19 DIAGNOSIS — Z3A.16 16 WEEKS GESTATION OF PREGNANCY: ICD-10-CM

## 2018-06-19 DIAGNOSIS — O99.212 OBESITY COMPLICATING PREGNANCY, SECOND TRIMESTER: ICD-10-CM

## 2018-06-25 ENCOUNTER — OUTPATIENT (OUTPATIENT)
Dept: OUTPATIENT SERVICES | Facility: HOSPITAL | Age: 33
LOS: 1 days | End: 2018-06-25
Payer: MEDICAID

## 2018-06-25 ENCOUNTER — LABORATORY RESULT (OUTPATIENT)
Age: 33
End: 2018-06-25

## 2018-06-25 ENCOUNTER — ASOB RESULT (OUTPATIENT)
Age: 33
End: 2018-06-25

## 2018-06-25 ENCOUNTER — APPOINTMENT (OUTPATIENT)
Dept: ANTEPARTUM | Facility: CLINIC | Age: 33
End: 2018-06-25
Payer: MEDICAID

## 2018-06-25 DIAGNOSIS — Z90.49 ACQUIRED ABSENCE OF OTHER SPECIFIED PARTS OF DIGESTIVE TRACT: Chronic | ICD-10-CM

## 2018-06-25 PROCEDURE — 87086 URINE CULTURE/COLONY COUNT: CPT

## 2018-06-25 PROCEDURE — 76805 OB US >/= 14 WKS SNGL FETUS: CPT

## 2018-06-25 PROCEDURE — 81003 URINALYSIS AUTO W/O SCOPE: CPT

## 2018-06-25 PROCEDURE — G0463: CPT

## 2018-06-25 PROCEDURE — 76817 TRANSVAGINAL US OBSTETRIC: CPT | Mod: 26

## 2018-06-25 PROCEDURE — 76815 OB US LIMITED FETUS(S): CPT

## 2018-06-25 PROCEDURE — 76815 OB US LIMITED FETUS(S): CPT | Mod: 26

## 2018-06-25 PROCEDURE — 76817 TRANSVAGINAL US OBSTETRIC: CPT

## 2018-06-26 LAB
CULTURE RESULTS: NO GROWTH — SIGNIFICANT CHANGE UP
SPECIMEN SOURCE: SIGNIFICANT CHANGE UP

## 2018-07-06 ENCOUNTER — RX RENEWAL (OUTPATIENT)
Age: 33
End: 2018-07-06

## 2018-07-09 ENCOUNTER — ASOB RESULT (OUTPATIENT)
Age: 33
End: 2018-07-09

## 2018-07-09 ENCOUNTER — APPOINTMENT (OUTPATIENT)
Dept: ANTEPARTUM | Facility: CLINIC | Age: 33
End: 2018-07-09
Payer: MEDICAID

## 2018-07-09 ENCOUNTER — MEDICATION RENEWAL (OUTPATIENT)
Age: 33
End: 2018-07-09

## 2018-07-09 ENCOUNTER — NON-APPOINTMENT (OUTPATIENT)
Age: 33
End: 2018-07-09

## 2018-07-09 ENCOUNTER — OUTPATIENT (OUTPATIENT)
Dept: OUTPATIENT SERVICES | Facility: HOSPITAL | Age: 33
LOS: 1 days | End: 2018-07-09
Payer: MEDICAID

## 2018-07-09 ENCOUNTER — APPOINTMENT (OUTPATIENT)
Dept: MATERNAL FETAL MEDICINE | Facility: CLINIC | Age: 33
End: 2018-07-09
Payer: MEDICAID

## 2018-07-09 VITALS
DIASTOLIC BLOOD PRESSURE: 60 MMHG | HEIGHT: 63.5 IN | SYSTOLIC BLOOD PRESSURE: 100 MMHG | BODY MASS INDEX: 35 KG/M2 | WEIGHT: 200 LBS

## 2018-07-09 DIAGNOSIS — Z90.49 ACQUIRED ABSENCE OF OTHER SPECIFIED PARTS OF DIGESTIVE TRACT: Chronic | ICD-10-CM

## 2018-07-09 DIAGNOSIS — O09.899 SUPERVISION OF OTHER HIGH RISK PREGNANCIES, UNSPECIFIED TRIMESTER: ICD-10-CM

## 2018-07-09 PROCEDURE — 81003 URINALYSIS AUTO W/O SCOPE: CPT | Mod: NC,QW

## 2018-07-09 PROCEDURE — 76811 OB US DETAILED SNGL FETUS: CPT

## 2018-07-09 PROCEDURE — 76817 TRANSVAGINAL US OBSTETRIC: CPT

## 2018-07-09 PROCEDURE — 81003 URINALYSIS AUTO W/O SCOPE: CPT

## 2018-07-09 PROCEDURE — G0463: CPT

## 2018-07-09 PROCEDURE — 76811 OB US DETAILED SNGL FETUS: CPT | Mod: 26

## 2018-07-09 PROCEDURE — 76817 TRANSVAGINAL US OBSTETRIC: CPT | Mod: 26

## 2018-07-09 PROCEDURE — 99213 OFFICE O/P EST LOW 20 MIN: CPT | Mod: 25,GE

## 2018-07-10 ENCOUNTER — NON-APPOINTMENT (OUTPATIENT)
Age: 33
End: 2018-07-10

## 2018-07-12 RX ORDER — CEPHALEXIN 500 MG/1
500 CAPSULE ORAL
Qty: 14 | Refills: 0 | Status: COMPLETED | COMMUNITY
Start: 2018-06-15 | End: 2018-07-12

## 2018-07-16 DIAGNOSIS — O35.8XX0 MATERNAL CARE FOR OTHER (SUSPECTED) FETAL ABNORMALITY AND DAMAGE, NOT APPLICABLE OR UNSPECIFIED: ICD-10-CM

## 2018-07-16 DIAGNOSIS — E03.9 HYPOTHYROIDISM, UNSPECIFIED: ICD-10-CM

## 2018-07-16 DIAGNOSIS — O26.22 PREGNANCY CARE FOR PATIENT WITH RECURRENT PREGNANCY LOSS, SECOND TRIMESTER: ICD-10-CM

## 2018-07-23 ENCOUNTER — APPOINTMENT (OUTPATIENT)
Dept: ANTEPARTUM | Facility: CLINIC | Age: 33
End: 2018-07-23
Payer: MEDICAID

## 2018-07-23 ENCOUNTER — ASOB RESULT (OUTPATIENT)
Age: 33
End: 2018-07-23

## 2018-07-23 PROCEDURE — 76817 TRANSVAGINAL US OBSTETRIC: CPT | Mod: 26

## 2018-07-23 PROCEDURE — 76816 OB US FOLLOW-UP PER FETUS: CPT | Mod: 26

## 2018-08-06 ENCOUNTER — OUTPATIENT (OUTPATIENT)
Dept: OUTPATIENT SERVICES | Facility: HOSPITAL | Age: 33
LOS: 1 days | End: 2018-08-06
Payer: MEDICAID

## 2018-08-06 ENCOUNTER — APPOINTMENT (OUTPATIENT)
Dept: MATERNAL FETAL MEDICINE | Facility: CLINIC | Age: 33
End: 2018-08-06
Payer: MEDICAID

## 2018-08-06 ENCOUNTER — LABORATORY RESULT (OUTPATIENT)
Age: 33
End: 2018-08-06

## 2018-08-06 ENCOUNTER — ASOB RESULT (OUTPATIENT)
Age: 33
End: 2018-08-06

## 2018-08-06 ENCOUNTER — NON-APPOINTMENT (OUTPATIENT)
Age: 33
End: 2018-08-06

## 2018-08-06 ENCOUNTER — APPOINTMENT (OUTPATIENT)
Dept: ANTEPARTUM | Facility: CLINIC | Age: 33
End: 2018-08-06
Payer: MEDICAID

## 2018-08-06 VITALS — WEIGHT: 203 LBS | BODY MASS INDEX: 35.4 KG/M2 | SYSTOLIC BLOOD PRESSURE: 112 MMHG | DIASTOLIC BLOOD PRESSURE: 66 MMHG

## 2018-08-06 DIAGNOSIS — O09.899 SUPERVISION OF OTHER HIGH RISK PREGNANCIES, UNSPECIFIED TRIMESTER: ICD-10-CM

## 2018-08-06 DIAGNOSIS — Z90.49 ACQUIRED ABSENCE OF OTHER SPECIFIED PARTS OF DIGESTIVE TRACT: Chronic | ICD-10-CM

## 2018-08-06 LAB
BILIRUB UR QL STRIP: NEGATIVE
GLUCOSE UR-MCNC: NEGATIVE
HCG UR QL: 0.2 EU/DL
HGB UR QL STRIP.AUTO: NEGATIVE
KETONES UR-MCNC: NEGATIVE
LEUKOCYTE ESTERASE UR QL STRIP: NORMAL
NITRITE UR QL STRIP: NEGATIVE
PH UR STRIP: 5.5
PROT UR STRIP-MCNC: NEGATIVE
SP GR UR STRIP: 1.02

## 2018-08-06 PROCEDURE — 76816 OB US FOLLOW-UP PER FETUS: CPT

## 2018-08-06 PROCEDURE — 99213 OFFICE O/P EST LOW 20 MIN: CPT | Mod: 25

## 2018-08-06 PROCEDURE — 84443 ASSAY THYROID STIM HORMONE: CPT

## 2018-08-06 PROCEDURE — 81003 URINALYSIS AUTO W/O SCOPE: CPT | Mod: NC,QW

## 2018-08-06 PROCEDURE — 76816 OB US FOLLOW-UP PER FETUS: CPT | Mod: 26

## 2018-08-06 PROCEDURE — G0463: CPT

## 2018-08-06 PROCEDURE — 81003 URINALYSIS AUTO W/O SCOPE: CPT

## 2018-08-06 PROCEDURE — 82950 GLUCOSE TEST: CPT

## 2018-08-07 LAB
GLUCOSE 1H P MEAL SERPL-MCNC: 192 MG/DL — HIGH (ref 70–134)
T4 FREE+ TSH PNL SERPL: 0.86 UIU/ML — SIGNIFICANT CHANGE UP (ref 0.27–4.2)

## 2018-08-08 ENCOUNTER — APPOINTMENT (OUTPATIENT)
Dept: MATERNAL FETAL MEDICINE | Facility: CLINIC | Age: 33
End: 2018-08-08
Payer: MEDICAID

## 2018-08-08 PROCEDURE — G0109 DIAB MANAGE TRN IND/GROUP: CPT

## 2018-08-13 DIAGNOSIS — O26.22 PREGNANCY CARE FOR PATIENT WITH RECURRENT PREGNANCY LOSS, SECOND TRIMESTER: ICD-10-CM

## 2018-08-13 DIAGNOSIS — O09.299 SUPERVISION OF PREGNANCY WITH OTHER POOR REPRODUCTIVE OR OBSTETRIC HISTORY, UNSPECIFIED TRIMESTER: ICD-10-CM

## 2018-08-13 DIAGNOSIS — O99.212 OBESITY COMPLICATING PREGNANCY, SECOND TRIMESTER: ICD-10-CM

## 2018-08-13 DIAGNOSIS — Z3A.16 16 WEEKS GESTATION OF PREGNANCY: ICD-10-CM

## 2018-08-13 DIAGNOSIS — O09.899 SUPERVISION OF OTHER HIGH RISK PREGNANCIES, UNSPECIFIED TRIMESTER: ICD-10-CM

## 2018-08-13 DIAGNOSIS — E03.9 HYPOTHYROIDISM, UNSPECIFIED: ICD-10-CM

## 2018-08-13 DIAGNOSIS — O09.90 SUPERVISION OF HIGH RISK PREGNANCY, UNSPECIFIED, UNSPECIFIED TRIMESTER: ICD-10-CM

## 2018-08-13 DIAGNOSIS — D25.9 LEIOMYOMA OF UTERUS, UNSPECIFIED: ICD-10-CM

## 2018-08-20 ENCOUNTER — NON-APPOINTMENT (OUTPATIENT)
Age: 33
End: 2018-08-20

## 2018-08-20 ENCOUNTER — APPOINTMENT (OUTPATIENT)
Dept: MATERNAL FETAL MEDICINE | Facility: CLINIC | Age: 33
End: 2018-08-20
Payer: MEDICAID

## 2018-08-20 ENCOUNTER — OUTPATIENT (OUTPATIENT)
Dept: OUTPATIENT SERVICES | Facility: HOSPITAL | Age: 33
LOS: 1 days | End: 2018-08-20
Payer: MEDICAID

## 2018-08-20 VITALS — SYSTOLIC BLOOD PRESSURE: 130 MMHG | WEIGHT: 205.2 LBS | BODY MASS INDEX: 35.78 KG/M2 | DIASTOLIC BLOOD PRESSURE: 78 MMHG

## 2018-08-20 DIAGNOSIS — E03.9 HYPOTHYROIDISM, UNSPECIFIED: ICD-10-CM

## 2018-08-20 DIAGNOSIS — Z90.49 ACQUIRED ABSENCE OF OTHER SPECIFIED PARTS OF DIGESTIVE TRACT: Chronic | ICD-10-CM

## 2018-08-20 DIAGNOSIS — O09.899 SUPERVISION OF OTHER HIGH RISK PREGNANCIES, UNSPECIFIED TRIMESTER: ICD-10-CM

## 2018-08-20 DIAGNOSIS — D25.9 LEIOMYOMA OF UTERUS, UNSPECIFIED: ICD-10-CM

## 2018-08-20 DIAGNOSIS — O09.299 SUPERVISION OF PREGNANCY WITH OTHER POOR REPRODUCTIVE OR OBSTETRIC HISTORY, UNSPECIFIED TRIMESTER: ICD-10-CM

## 2018-08-20 DIAGNOSIS — O09.90 SUPERVISION OF HIGH RISK PREGNANCY, UNSPECIFIED, UNSPECIFIED TRIMESTER: ICD-10-CM

## 2018-08-20 LAB
BILIRUB UR QL STRIP: NEGATIVE
GLUCOSE UR-MCNC: NEGATIVE
HCG UR QL: 0.2 EU/DL
HGB UR QL STRIP.AUTO: NEGATIVE
KETONES UR-MCNC: NORMAL
LEUKOCYTE ESTERASE UR QL STRIP: NEGATIVE
NITRITE UR QL STRIP: NEGATIVE
PH UR STRIP: 5.5
PROT UR STRIP-MCNC: NEGATIVE
SP GR UR STRIP: >=1.03

## 2018-08-20 PROCEDURE — G0108 DIAB MANAGE TRN  PER INDIV: CPT

## 2018-08-20 PROCEDURE — 81003 URINALYSIS AUTO W/O SCOPE: CPT | Mod: NC,QW

## 2018-08-20 PROCEDURE — G0108: CPT

## 2018-08-20 PROCEDURE — G0463: CPT

## 2018-08-20 PROCEDURE — 99213 OFFICE O/P EST LOW 20 MIN: CPT | Mod: GE,25

## 2018-08-20 PROCEDURE — 81003 URINALYSIS AUTO W/O SCOPE: CPT

## 2018-08-23 ENCOUNTER — NON-APPOINTMENT (OUTPATIENT)
Age: 33
End: 2018-08-23

## 2018-08-27 ENCOUNTER — ASOB RESULT (OUTPATIENT)
Age: 33
End: 2018-08-27

## 2018-08-27 ENCOUNTER — APPOINTMENT (OUTPATIENT)
Dept: ANTEPARTUM | Facility: CLINIC | Age: 33
End: 2018-08-27
Payer: MEDICAID

## 2018-08-27 ENCOUNTER — APPOINTMENT (OUTPATIENT)
Dept: MATERNAL FETAL MEDICINE | Facility: CLINIC | Age: 33
End: 2018-08-27
Payer: MEDICAID

## 2018-08-27 ENCOUNTER — OUTPATIENT (OUTPATIENT)
Dept: OUTPATIENT SERVICES | Facility: HOSPITAL | Age: 33
LOS: 1 days | End: 2018-08-27
Payer: MEDICAID

## 2018-08-27 ENCOUNTER — NON-APPOINTMENT (OUTPATIENT)
Age: 33
End: 2018-08-27

## 2018-08-27 ENCOUNTER — APPOINTMENT (OUTPATIENT)
Dept: MATERNAL FETAL MEDICINE | Facility: CLINIC | Age: 33
End: 2018-08-27

## 2018-08-27 VITALS — SYSTOLIC BLOOD PRESSURE: 112 MMHG | BODY MASS INDEX: 35.4 KG/M2 | DIASTOLIC BLOOD PRESSURE: 76 MMHG | WEIGHT: 203 LBS

## 2018-08-27 DIAGNOSIS — Z90.49 ACQUIRED ABSENCE OF OTHER SPECIFIED PARTS OF DIGESTIVE TRACT: Chronic | ICD-10-CM

## 2018-08-27 DIAGNOSIS — O24.410 GESTATIONAL DIABETES MELLITUS IN PREGNANCY, DIET CONTROLLED: ICD-10-CM

## 2018-08-27 DIAGNOSIS — O09.899 SUPERVISION OF OTHER HIGH RISK PREGNANCIES, UNSPECIFIED TRIMESTER: ICD-10-CM

## 2018-08-27 DIAGNOSIS — Z3A.27 27 WEEKS GESTATION OF PREGNANCY: ICD-10-CM

## 2018-08-27 LAB
BILIRUB UR QL STRIP: NEGATIVE
GLUCOSE UR-MCNC: NEGATIVE
HCG UR QL: 0.2 EU/DL
HGB UR QL STRIP.AUTO: NEGATIVE
KETONES UR-MCNC: NORMAL
LEUKOCYTE ESTERASE UR QL STRIP: NEGATIVE
NITRITE UR QL STRIP: NEGATIVE
PH UR STRIP: 5.5
PROT UR STRIP-MCNC: NEGATIVE
SP GR UR STRIP: 1.02

## 2018-08-27 PROCEDURE — 76816 OB US FOLLOW-UP PER FETUS: CPT | Mod: 26

## 2018-08-27 PROCEDURE — 76816 OB US FOLLOW-UP PER FETUS: CPT

## 2018-08-27 PROCEDURE — 99213 OFFICE O/P EST LOW 20 MIN: CPT

## 2018-08-27 PROCEDURE — G0463: CPT

## 2018-08-29 DIAGNOSIS — O24.410 GESTATIONAL DIABETES MELLITUS IN PREGNANCY, DIET CONTROLLED: ICD-10-CM

## 2018-09-12 ENCOUNTER — NON-APPOINTMENT (OUTPATIENT)
Age: 33
End: 2018-09-12

## 2018-09-12 ENCOUNTER — OUTPATIENT (OUTPATIENT)
Dept: OUTPATIENT SERVICES | Facility: HOSPITAL | Age: 33
LOS: 1 days | End: 2018-09-12
Payer: MEDICAID

## 2018-09-12 ENCOUNTER — APPOINTMENT (OUTPATIENT)
Dept: MATERNAL FETAL MEDICINE | Facility: CLINIC | Age: 33
End: 2018-09-12
Payer: MEDICAID

## 2018-09-12 ENCOUNTER — APPOINTMENT (OUTPATIENT)
Dept: MATERNAL FETAL MEDICINE | Facility: CLINIC | Age: 33
End: 2018-09-12

## 2018-09-12 VITALS — WEIGHT: 204 LBS | DIASTOLIC BLOOD PRESSURE: 68 MMHG | SYSTOLIC BLOOD PRESSURE: 104 MMHG | BODY MASS INDEX: 35.57 KG/M2

## 2018-09-12 DIAGNOSIS — D25.9 LEIOMYOMA OF UTERUS, UNSPECIFIED: ICD-10-CM

## 2018-09-12 DIAGNOSIS — Z90.49 ACQUIRED ABSENCE OF OTHER SPECIFIED PARTS OF DIGESTIVE TRACT: Chronic | ICD-10-CM

## 2018-09-12 LAB
BILIRUB UR QL STRIP: NEGATIVE
GLUCOSE UR-MCNC: NEGATIVE
HCG UR QL: 0.2 EU/DL
HGB UR QL STRIP.AUTO: NEGATIVE
KETONES UR-MCNC: NEGATIVE
LEUKOCYTE ESTERASE UR QL STRIP: NORMAL
NITRITE UR QL STRIP: NEGATIVE
PH UR STRIP: 7
PROT UR STRIP-MCNC: NEGATIVE
SP GR UR STRIP: 1.02

## 2018-09-12 PROCEDURE — 90715 TDAP VACCINE 7 YRS/> IM: CPT | Mod: NC

## 2018-09-12 PROCEDURE — G0463: CPT

## 2018-09-12 PROCEDURE — 90471 IMMUNIZATION ADMIN: CPT | Mod: NC

## 2018-09-12 PROCEDURE — 90471 IMMUNIZATION ADMIN: CPT

## 2018-09-12 PROCEDURE — 99213 OFFICE O/P EST LOW 20 MIN: CPT | Mod: 25

## 2018-09-12 PROCEDURE — 81003 URINALYSIS AUTO W/O SCOPE: CPT

## 2018-09-12 PROCEDURE — 90715 TDAP VACCINE 7 YRS/> IM: CPT

## 2018-09-12 PROCEDURE — 81003 URINALYSIS AUTO W/O SCOPE: CPT | Mod: NC,QW

## 2018-09-17 ENCOUNTER — NON-APPOINTMENT (OUTPATIENT)
Age: 33
End: 2018-09-17

## 2018-09-24 ENCOUNTER — APPOINTMENT (OUTPATIENT)
Dept: MATERNAL FETAL MEDICINE | Facility: CLINIC | Age: 33
End: 2018-09-24
Payer: MEDICAID

## 2018-09-24 ENCOUNTER — OUTPATIENT (OUTPATIENT)
Dept: OUTPATIENT SERVICES | Facility: HOSPITAL | Age: 33
LOS: 1 days | End: 2018-09-24
Payer: MEDICAID

## 2018-09-24 ENCOUNTER — NON-APPOINTMENT (OUTPATIENT)
Age: 33
End: 2018-09-24

## 2018-09-24 ENCOUNTER — APPOINTMENT (OUTPATIENT)
Dept: ANTEPARTUM | Facility: CLINIC | Age: 33
End: 2018-09-24
Payer: MEDICAID

## 2018-09-24 ENCOUNTER — ASOB RESULT (OUTPATIENT)
Age: 33
End: 2018-09-24

## 2018-09-24 VITALS — BODY MASS INDEX: 35.54 KG/M2 | DIASTOLIC BLOOD PRESSURE: 82 MMHG | WEIGHT: 203.8 LBS | SYSTOLIC BLOOD PRESSURE: 124 MMHG

## 2018-09-24 DIAGNOSIS — O99.283 ENDOCRINE, NUTRITIONAL AND METABOLIC DISEASES COMPLICATING PREGNANCY, THIRD TRIMESTER: ICD-10-CM

## 2018-09-24 DIAGNOSIS — O26.23 PREGNANCY CARE FOR PATIENT WITH RECURRENT PREGNANCY LOSS, THIRD TRIMESTER: ICD-10-CM

## 2018-09-24 DIAGNOSIS — O09.899 SUPERVISION OF OTHER HIGH RISK PREGNANCIES, UNSPECIFIED TRIMESTER: ICD-10-CM

## 2018-09-24 DIAGNOSIS — Z3A.31 31 WEEKS GESTATION OF PREGNANCY: ICD-10-CM

## 2018-09-24 DIAGNOSIS — Z90.49 ACQUIRED ABSENCE OF OTHER SPECIFIED PARTS OF DIGESTIVE TRACT: Chronic | ICD-10-CM

## 2018-09-24 LAB
BILIRUB UR QL STRIP: NEGATIVE
GLUCOSE UR-MCNC: NEGATIVE
HCG UR QL: 0.2 EU/DL
HGB UR QL STRIP.AUTO: NEGATIVE
KETONES UR-MCNC: NEGATIVE
LEUKOCYTE ESTERASE UR QL STRIP: NEGATIVE
NITRITE UR QL STRIP: NEGATIVE
PH UR STRIP: 7
PROT UR STRIP-MCNC: NEGATIVE
SP GR UR STRIP: 1.01

## 2018-09-24 PROCEDURE — 76816 OB US FOLLOW-UP PER FETUS: CPT | Mod: 26

## 2018-09-24 PROCEDURE — 76816 OB US FOLLOW-UP PER FETUS: CPT

## 2018-09-24 PROCEDURE — G0108 DIAB MANAGE TRN  PER INDIV: CPT

## 2018-09-24 PROCEDURE — 99213 OFFICE O/P EST LOW 20 MIN: CPT | Mod: 25

## 2018-09-24 PROCEDURE — 81003 URINALYSIS AUTO W/O SCOPE: CPT | Mod: NC,QW

## 2018-09-24 RX ORDER — LEVOTHYROXINE SODIUM 0.17 MG/1
175 TABLET ORAL DAILY
Qty: 90 | Refills: 1 | Status: DISCONTINUED | COMMUNITY
Start: 2017-05-17 | End: 2018-09-24

## 2018-09-24 RX ORDER — ONDANSETRON 4 MG/1
4 TABLET ORAL EVERY 6 HOURS
Qty: 30 | Refills: 0 | Status: DISCONTINUED | COMMUNITY
Start: 2018-09-24 | End: 2018-09-24

## 2018-09-28 ENCOUNTER — NON-APPOINTMENT (OUTPATIENT)
Age: 33
End: 2018-09-28

## 2018-10-01 ENCOUNTER — NON-APPOINTMENT (OUTPATIENT)
Age: 33
End: 2018-10-01

## 2018-10-01 ENCOUNTER — OUTPATIENT (OUTPATIENT)
Dept: OUTPATIENT SERVICES | Facility: HOSPITAL | Age: 33
LOS: 1 days | End: 2018-10-01
Payer: MEDICAID

## 2018-10-01 ENCOUNTER — APPOINTMENT (OUTPATIENT)
Dept: MATERNAL FETAL MEDICINE | Facility: CLINIC | Age: 33
End: 2018-10-01
Payer: MEDICAID

## 2018-10-01 DIAGNOSIS — Z90.49 ACQUIRED ABSENCE OF OTHER SPECIFIED PARTS OF DIGESTIVE TRACT: Chronic | ICD-10-CM

## 2018-10-01 DIAGNOSIS — O09.899 SUPERVISION OF OTHER HIGH RISK PREGNANCIES, UNSPECIFIED TRIMESTER: ICD-10-CM

## 2018-10-01 LAB
BILIRUB UR QL STRIP: NEGATIVE
GLUCOSE UR-MCNC: NEGATIVE
HCG UR QL: 0.2 EU/DL
HGB UR QL STRIP.AUTO: NEGATIVE
KETONES UR-MCNC: NORMAL
LEUKOCYTE ESTERASE UR QL STRIP: NEGATIVE
NITRITE UR QL STRIP: NEGATIVE
PH UR STRIP: 6
PROT UR STRIP-MCNC: NEGATIVE
SP GR UR STRIP: 1.02

## 2018-10-01 PROCEDURE — 90471 IMMUNIZATION ADMIN: CPT | Mod: NC

## 2018-10-01 PROCEDURE — 90656 IIV3 VACC NO PRSV 0.5 ML IM: CPT

## 2018-10-01 PROCEDURE — G0463: CPT

## 2018-10-01 PROCEDURE — 99213 OFFICE O/P EST LOW 20 MIN: CPT | Mod: 25

## 2018-10-01 PROCEDURE — G0108 DIAB MANAGE TRN  PER INDIV: CPT

## 2018-10-01 PROCEDURE — G0108: CPT

## 2018-10-01 PROCEDURE — 90656 IIV3 VACC NO PRSV 0.5 ML IM: CPT | Mod: NC

## 2018-10-01 PROCEDURE — 81003 URINALYSIS AUTO W/O SCOPE: CPT | Mod: NC,QW

## 2018-10-01 PROCEDURE — 90471 IMMUNIZATION ADMIN: CPT

## 2018-10-01 PROCEDURE — 81003 URINALYSIS AUTO W/O SCOPE: CPT

## 2018-10-02 DIAGNOSIS — O09.899 SUPERVISION OF OTHER HIGH RISK PREGNANCIES, UNSPECIFIED TRIMESTER: ICD-10-CM

## 2018-10-03 DIAGNOSIS — R12 HEARTBURN: ICD-10-CM

## 2018-10-03 DIAGNOSIS — D25.9 LEIOMYOMA OF UTERUS, UNSPECIFIED: ICD-10-CM

## 2018-10-03 DIAGNOSIS — O24.919 UNSPECIFIED DIABETES MELLITUS IN PREGNANCY, UNSPECIFIED TRIMESTER: ICD-10-CM

## 2018-10-03 DIAGNOSIS — O09.90 SUPERVISION OF HIGH RISK PREGNANCY, UNSPECIFIED, UNSPECIFIED TRIMESTER: ICD-10-CM

## 2018-10-05 DIAGNOSIS — Z23 ENCOUNTER FOR IMMUNIZATION: ICD-10-CM

## 2018-10-05 DIAGNOSIS — O24.919 UNSPECIFIED DIABETES MELLITUS IN PREGNANCY, UNSPECIFIED TRIMESTER: ICD-10-CM

## 2018-10-05 DIAGNOSIS — O26.20 PREGNANCY CARE FOR PATIENT WITH RECURRENT PREGNANCY LOSS, UNSPECIFIED TRIMESTER: ICD-10-CM

## 2018-10-10 ENCOUNTER — MESSAGE (OUTPATIENT)
Age: 33
End: 2018-10-10

## 2018-10-15 ENCOUNTER — OTHER (OUTPATIENT)
Age: 33
End: 2018-10-15

## 2018-10-22 ENCOUNTER — APPOINTMENT (OUTPATIENT)
Dept: MATERNAL FETAL MEDICINE | Facility: CLINIC | Age: 33
End: 2018-10-22
Payer: MEDICAID

## 2018-10-22 ENCOUNTER — LABORATORY RESULT (OUTPATIENT)
Age: 33
End: 2018-10-22

## 2018-10-22 ENCOUNTER — NON-APPOINTMENT (OUTPATIENT)
Age: 33
End: 2018-10-22

## 2018-10-22 ENCOUNTER — ASOB RESULT (OUTPATIENT)
Age: 33
End: 2018-10-22

## 2018-10-22 ENCOUNTER — APPOINTMENT (OUTPATIENT)
Dept: ANTEPARTUM | Facility: CLINIC | Age: 33
End: 2018-10-22
Payer: MEDICAID

## 2018-10-22 ENCOUNTER — OUTPATIENT (OUTPATIENT)
Dept: OUTPATIENT SERVICES | Facility: HOSPITAL | Age: 33
LOS: 1 days | End: 2018-10-22
Payer: MEDICAID

## 2018-10-22 VITALS — BODY MASS INDEX: 36.09 KG/M2 | WEIGHT: 207 LBS | SYSTOLIC BLOOD PRESSURE: 90 MMHG | DIASTOLIC BLOOD PRESSURE: 60 MMHG

## 2018-10-22 DIAGNOSIS — O24.410 GESTATIONAL DIABETES MELLITUS IN PREGNANCY, DIET CONTROLLED: ICD-10-CM

## 2018-10-22 DIAGNOSIS — O26.23 PREGNANCY CARE FOR PATIENT WITH RECURRENT PREGNANCY LOSS, THIRD TRIMESTER: ICD-10-CM

## 2018-10-22 DIAGNOSIS — O09.899 SUPERVISION OF OTHER HIGH RISK PREGNANCIES, UNSPECIFIED TRIMESTER: ICD-10-CM

## 2018-10-22 DIAGNOSIS — O99.213 OBESITY COMPLICATING PREGNANCY, THIRD TRIMESTER: ICD-10-CM

## 2018-10-22 DIAGNOSIS — Z90.49 ACQUIRED ABSENCE OF OTHER SPECIFIED PARTS OF DIGESTIVE TRACT: Chronic | ICD-10-CM

## 2018-10-22 DIAGNOSIS — O09.293 SUPERVISION OF PREGNANCY WITH OTHER POOR REPRODUCTIVE OR OBSTETRIC HISTORY, THIRD TRIMESTER: ICD-10-CM

## 2018-10-22 LAB
BILIRUB UR QL STRIP: NEGATIVE
GLUCOSE UR-MCNC: NEGATIVE
HCG UR QL: 0.2 EU/DL
HGB UR QL STRIP.AUTO: NEGATIVE
KETONES UR-MCNC: NEGATIVE
LEUKOCYTE ESTERASE UR QL STRIP: NEGATIVE
NITRITE UR QL STRIP: NEGATIVE
PH UR STRIP: 5.5
PROT UR STRIP-MCNC: NEGATIVE
SP GR UR STRIP: 1.02

## 2018-10-22 PROCEDURE — 76816 OB US FOLLOW-UP PER FETUS: CPT | Mod: 26

## 2018-10-22 PROCEDURE — G0463: CPT

## 2018-10-22 PROCEDURE — 99213 OFFICE O/P EST LOW 20 MIN: CPT | Mod: 25

## 2018-10-22 PROCEDURE — 81003 URINALYSIS AUTO W/O SCOPE: CPT

## 2018-10-22 PROCEDURE — 76818 FETAL BIOPHYS PROFILE W/NST: CPT | Mod: 26

## 2018-10-22 PROCEDURE — 81003 URINALYSIS AUTO W/O SCOPE: CPT | Mod: NC,QW

## 2018-10-22 PROCEDURE — G0108: CPT

## 2018-10-22 PROCEDURE — 76816 OB US FOLLOW-UP PER FETUS: CPT

## 2018-10-22 PROCEDURE — 84443 ASSAY THYROID STIM HORMONE: CPT

## 2018-10-22 PROCEDURE — 76818 FETAL BIOPHYS PROFILE W/NST: CPT

## 2018-10-22 PROCEDURE — G0108 DIAB MANAGE TRN  PER INDIV: CPT

## 2018-10-23 LAB — T4 FREE+ TSH PNL SERPL: 0.3 UIU/ML — SIGNIFICANT CHANGE UP (ref 0.27–4.2)

## 2018-10-24 ENCOUNTER — RX RENEWAL (OUTPATIENT)
Age: 33
End: 2018-10-24

## 2018-10-28 ENCOUNTER — LABORATORY RESULT (OUTPATIENT)
Age: 33
End: 2018-10-28

## 2018-10-29 ENCOUNTER — OUTPATIENT (OUTPATIENT)
Dept: OUTPATIENT SERVICES | Facility: HOSPITAL | Age: 33
LOS: 1 days | End: 2018-10-29
Payer: MEDICAID

## 2018-10-29 ENCOUNTER — APPOINTMENT (OUTPATIENT)
Dept: MATERNAL FETAL MEDICINE | Facility: CLINIC | Age: 33
End: 2018-10-29
Payer: MEDICAID

## 2018-10-29 ENCOUNTER — NON-APPOINTMENT (OUTPATIENT)
Age: 33
End: 2018-10-29

## 2018-10-29 ENCOUNTER — ASOB RESULT (OUTPATIENT)
Age: 33
End: 2018-10-29

## 2018-10-29 ENCOUNTER — APPOINTMENT (OUTPATIENT)
Dept: ANTEPARTUM | Facility: CLINIC | Age: 33
End: 2018-10-29
Payer: MEDICAID

## 2018-10-29 VITALS
WEIGHT: 209 LBS | DIASTOLIC BLOOD PRESSURE: 62 MMHG | SYSTOLIC BLOOD PRESSURE: 116 MMHG | BODY MASS INDEX: 36.57 KG/M2 | HEIGHT: 63.5 IN

## 2018-10-29 DIAGNOSIS — O09.899 SUPERVISION OF OTHER HIGH RISK PREGNANCIES, UNSPECIFIED TRIMESTER: ICD-10-CM

## 2018-10-29 DIAGNOSIS — Z90.49 ACQUIRED ABSENCE OF OTHER SPECIFIED PARTS OF DIGESTIVE TRACT: Chronic | ICD-10-CM

## 2018-10-29 DIAGNOSIS — O09.90 SUPERVISION OF HIGH RISK PREGNANCY, UNSPECIFIED, UNSPECIFIED TRIMESTER: ICD-10-CM

## 2018-10-29 LAB
BILIRUB UR QL STRIP: NEGATIVE
GLUCOSE UR-MCNC: NEGATIVE
HCG UR QL: 0.2 EU/DL
HGB UR QL STRIP.AUTO: NEGATIVE
KETONES UR-MCNC: NORMAL
LEUKOCYTE ESTERASE UR QL STRIP: NORMAL
NITRITE UR QL STRIP: NEGATIVE
PH UR STRIP: 6
PROT UR STRIP-MCNC: NEGATIVE
SP GR UR STRIP: 1.02

## 2018-10-29 PROCEDURE — G0108 DIAB MANAGE TRN  PER INDIV: CPT

## 2018-10-29 PROCEDURE — 76818 FETAL BIOPHYS PROFILE W/NST: CPT

## 2018-10-29 PROCEDURE — G0108: CPT

## 2018-10-29 PROCEDURE — 76818 FETAL BIOPHYS PROFILE W/NST: CPT | Mod: 26

## 2018-10-29 PROCEDURE — 87653 STREP B DNA AMP PROBE: CPT

## 2018-10-29 PROCEDURE — 81003 URINALYSIS AUTO W/O SCOPE: CPT | Mod: NC,QW

## 2018-10-29 PROCEDURE — G0463: CPT

## 2018-10-29 PROCEDURE — 99213 OFFICE O/P EST LOW 20 MIN: CPT | Mod: 25

## 2018-10-29 PROCEDURE — 81003 URINALYSIS AUTO W/O SCOPE: CPT

## 2018-10-31 LAB
GROUP B BETA STREP DNA (PCR): DETECTED
GROUP B BETA STREP INTERPRETATION: SIGNIFICANT CHANGE UP
SOURCE GROUP B STREP: SIGNIFICANT CHANGE UP

## 2018-11-05 ENCOUNTER — APPOINTMENT (OUTPATIENT)
Dept: MATERNAL FETAL MEDICINE | Facility: CLINIC | Age: 33
End: 2018-11-05

## 2018-11-05 ENCOUNTER — APPOINTMENT (OUTPATIENT)
Dept: MATERNAL FETAL MEDICINE | Facility: CLINIC | Age: 33
End: 2018-11-05
Payer: MEDICAID

## 2018-11-05 ENCOUNTER — ASOB RESULT (OUTPATIENT)
Age: 33
End: 2018-11-05

## 2018-11-05 ENCOUNTER — NON-APPOINTMENT (OUTPATIENT)
Age: 33
End: 2018-11-05

## 2018-11-05 ENCOUNTER — APPOINTMENT (OUTPATIENT)
Dept: ANTEPARTUM | Facility: CLINIC | Age: 33
End: 2018-11-05
Payer: MEDICAID

## 2018-11-05 ENCOUNTER — OUTPATIENT (OUTPATIENT)
Dept: OUTPATIENT SERVICES | Facility: HOSPITAL | Age: 33
LOS: 1 days | End: 2018-11-05
Payer: MEDICAID

## 2018-11-05 ENCOUNTER — EMERGENCY (EMERGENCY)
Facility: HOSPITAL | Age: 33
LOS: 1 days | Discharge: HOME | End: 2018-11-05
Attending: EMERGENCY MEDICINE
Payer: MEDICAID

## 2018-11-05 VITALS
WEIGHT: 205.38 LBS | SYSTOLIC BLOOD PRESSURE: 122 MMHG | DIASTOLIC BLOOD PRESSURE: 78 MMHG | BODY MASS INDEX: 35.81 KG/M2

## 2018-11-05 VITALS
RESPIRATION RATE: 22 BRPM | HEART RATE: 101 BPM | DIASTOLIC BLOOD PRESSURE: 76 MMHG | OXYGEN SATURATION: 97 % | SYSTOLIC BLOOD PRESSURE: 112 MMHG | TEMPERATURE: 98 F

## 2018-11-05 VITALS
DIASTOLIC BLOOD PRESSURE: 70 MMHG | TEMPERATURE: 98 F | SYSTOLIC BLOOD PRESSURE: 111 MMHG | RESPIRATION RATE: 22 BRPM | HEART RATE: 106 BPM | OXYGEN SATURATION: 97 % | HEIGHT: 63 IN | WEIGHT: 205.03 LBS

## 2018-11-05 DIAGNOSIS — Z90.49 ACQUIRED ABSENCE OF OTHER SPECIFIED PARTS OF DIGESTIVE TRACT: Chronic | ICD-10-CM

## 2018-11-05 DIAGNOSIS — O09.899 SUPERVISION OF OTHER HIGH RISK PREGNANCIES, UNSPECIFIED TRIMESTER: ICD-10-CM

## 2018-11-05 PROCEDURE — 76815 OB US LIMITED FETUS(S): CPT | Mod: 26

## 2018-11-05 PROCEDURE — 99284 EMERGENCY DEPT VISIT MOD MDM: CPT

## 2018-11-05 PROCEDURE — 71045 X-RAY EXAM CHEST 1 VIEW: CPT

## 2018-11-05 PROCEDURE — 81003 URINALYSIS AUTO W/O SCOPE: CPT | Mod: NC,QW

## 2018-11-05 PROCEDURE — 81003 URINALYSIS AUTO W/O SCOPE: CPT

## 2018-11-05 PROCEDURE — 76815 OB US LIMITED FETUS(S): CPT

## 2018-11-05 PROCEDURE — 76818 FETAL BIOPHYS PROFILE W/NST: CPT

## 2018-11-05 PROCEDURE — 76818 FETAL BIOPHYS PROFILE W/NST: CPT | Mod: 26

## 2018-11-05 PROCEDURE — 71045 X-RAY EXAM CHEST 1 VIEW: CPT | Mod: 26

## 2018-11-05 PROCEDURE — G0463: CPT

## 2018-11-05 PROCEDURE — 99213 OFFICE O/P EST LOW 20 MIN: CPT | Mod: 25

## 2018-11-05 NOTE — ED ADULT NURSE NOTE - NSIMPLEMENTINTERV_GEN_ALL_ED
Implemented All Universal Safety Interventions:  [place] to call system. Call bell, personal items and telephone within reach. Instruct patient to call for assistance. Room bathroom lighting operational. Non-slip footwear when patient is off stretcher. Physically safe environment: no spills, clutter or unnecessary equipment. Stretcher in lowest position, wheels locked, appropriate side rails in place.

## 2018-11-05 NOTE — ED PROVIDER NOTE - MEDICAL DECISION MAKING DETAILS
Attending note. 37 weeks pregnant patients with cough for 3 days, sent to the ER by her OB 4 chest x-ray. Likely viral. Chest x-ray will pneumonia as per her physician.

## 2018-11-05 NOTE — ED PROVIDER NOTE - OBJECTIVE STATEMENT
33 y.o. female 37 weeks pregnant sent in by her OBGYN for a CxR.  Pt has had a cough over the past 4 days.  Cough is slightly productive, when it started did have some chills and body aches which has since resolved.  Cough is consistent throughout the day.  No chest pain, palp, or SoB.  No abd pain, vaginal bleeding.  Nothing makes the cough better or worse. 33 y.o. female 37 weeks pregnant sent in by her OBGYN for a CxR.  Pt has had a cough over the past 4 days.  Cough is slightly productive, when it started did have some chills and body aches which has since resolved.  Cough is consistent throughout the day.  No chest pain, palp, or SoB.  No abd pain, vaginal bleeding.  Nothing makes the cough better or worse.      Attending note. Patient was seen in fast track from #2. Agree with the above. Patient is a 37 weeks pregnant and was seen by her OB today. Patient complaints of cough for 3 days. She denies any fever or shortness of breath. Patient states she occasionally wheezes is a nonproductive cough ears she denies any nausea or vomiting. Patient's son was diagnosed with mycoplasma and had no virus last week. Patient was sent to the ER for chest x-ray.

## 2018-11-05 NOTE — ED ADULT NURSE NOTE - OBJECTIVE STATEMENT
32 y/o female currently 37 weeks pregnant presenting to ED c/o cough. Pt states she saw her OB today who instructed her to come to the ED to have a chest xray for evaluation of cough. At this time denies chest pain, sob, ha, n/v/d, abdominal pain, f/c, urinary symptoms, hematuria, weakness, dizziness, numbness, tinging. A&Ox4 gross neuro intact, lungs cta bilaterally, no difficulty speaking in complete sentences, dry cough present, s1s2 heart sounds heard, pulses x 4, monsivais x4, abdomen soft nontender nondistended, skin intact. Safety and comfort measures maintained. Patient undressed and placed into gown, call bell in hand and side rails up for safety. warm blanket provided, vital signs stable, pt in no acute distress.

## 2018-11-05 NOTE — ED PROVIDER NOTE - PHYSICAL EXAMINATION
Attending note. Patient is alert and in no acute distress. Oropharynx is normal. There's no cervical adenopathy. Patient is end expiratory wheeze bilaterally with good air entry. There is no rales or rhonchi. Heart is regular rate and rhythm without murmur. Abdomen soft nontender the graft.

## 2018-11-05 NOTE — ED ADULT TRIAGE NOTE - CHIEF COMPLAINT QUOTE
Cough for 4 days, sent from maternal fetal medicine for chest xray. No OB gyn issue (no abdominal cramping/ no vaginal bleeding).  no SOB

## 2018-11-08 DIAGNOSIS — O24.415 GESTATIONAL DIABETES MELLITUS IN PREGNANCY, CONTROLLED BY ORAL HYPOGLYCEMIC DRUGS: ICD-10-CM

## 2018-11-08 DIAGNOSIS — E03.9 HYPOTHYROIDISM, UNSPECIFIED: ICD-10-CM

## 2018-11-08 DIAGNOSIS — O09.299 SUPERVISION OF PREGNANCY WITH OTHER POOR REPRODUCTIVE OR OBSTETRIC HISTORY, UNSPECIFIED TRIMESTER: ICD-10-CM

## 2018-11-08 DIAGNOSIS — O09.90 SUPERVISION OF HIGH RISK PREGNANCY, UNSPECIFIED, UNSPECIFIED TRIMESTER: ICD-10-CM

## 2018-11-08 DIAGNOSIS — O24.419 GESTATIONAL DIABETES MELLITUS IN PREGNANCY, UNSPECIFIED CONTROL: ICD-10-CM

## 2018-11-08 DIAGNOSIS — O24.919 UNSPECIFIED DIABETES MELLITUS IN PREGNANCY, UNSPECIFIED TRIMESTER: ICD-10-CM

## 2018-11-12 ENCOUNTER — LABORATORY RESULT (OUTPATIENT)
Age: 33
End: 2018-11-12

## 2018-11-12 ENCOUNTER — APPOINTMENT (OUTPATIENT)
Dept: ANTEPARTUM | Facility: CLINIC | Age: 33
End: 2018-11-12
Payer: MEDICAID

## 2018-11-12 ENCOUNTER — NON-APPOINTMENT (OUTPATIENT)
Age: 33
End: 2018-11-12

## 2018-11-12 ENCOUNTER — ASOB RESULT (OUTPATIENT)
Age: 33
End: 2018-11-12

## 2018-11-12 ENCOUNTER — APPOINTMENT (OUTPATIENT)
Dept: MATERNAL FETAL MEDICINE | Facility: CLINIC | Age: 33
End: 2018-11-12
Payer: MEDICAID

## 2018-11-12 ENCOUNTER — OUTPATIENT (OUTPATIENT)
Dept: OUTPATIENT SERVICES | Facility: HOSPITAL | Age: 33
LOS: 1 days | End: 2018-11-12
Payer: MEDICAID

## 2018-11-12 VITALS — BODY MASS INDEX: 35.68 KG/M2 | WEIGHT: 204.6 LBS

## 2018-11-12 VITALS — DIASTOLIC BLOOD PRESSURE: 74 MMHG | OXYGEN SATURATION: 98 % | SYSTOLIC BLOOD PRESSURE: 110 MMHG

## 2018-11-12 DIAGNOSIS — O09.899 SUPERVISION OF OTHER HIGH RISK PREGNANCIES, UNSPECIFIED TRIMESTER: ICD-10-CM

## 2018-11-12 DIAGNOSIS — O26.23 PREGNANCY CARE FOR PATIENT WITH RECURRENT PREGNANCY LOSS, THIRD TRIMESTER: ICD-10-CM

## 2018-11-12 DIAGNOSIS — Z3A.38 38 WEEKS GESTATION OF PREGNANCY: ICD-10-CM

## 2018-11-12 DIAGNOSIS — Z90.49 ACQUIRED ABSENCE OF OTHER SPECIFIED PARTS OF DIGESTIVE TRACT: Chronic | ICD-10-CM

## 2018-11-12 LAB
BILIRUB UR QL STRIP: NORMAL
GLUCOSE UR-MCNC: NEGATIVE
HCG UR QL: 0.2 EU/DL
HGB UR QL STRIP.AUTO: NORMAL
KETONES UR-MCNC: NORMAL
LEUKOCYTE ESTERASE UR QL STRIP: NORMAL
NITRITE UR QL STRIP: NEGATIVE
PH UR STRIP: 6
PROT UR STRIP-MCNC: NORMAL
SP GR UR STRIP: >1.03

## 2018-11-12 PROCEDURE — 81003 URINALYSIS AUTO W/O SCOPE: CPT | Mod: NC,QW

## 2018-11-12 PROCEDURE — G0108: CPT

## 2018-11-12 PROCEDURE — 87086 URINE CULTURE/COLONY COUNT: CPT

## 2018-11-12 PROCEDURE — 87389 HIV-1 AG W/HIV-1&-2 AB AG IA: CPT

## 2018-11-12 PROCEDURE — G0463: CPT

## 2018-11-12 PROCEDURE — 76818 FETAL BIOPHYS PROFILE W/NST: CPT

## 2018-11-12 PROCEDURE — 85027 COMPLETE CBC AUTOMATED: CPT

## 2018-11-12 PROCEDURE — 76818 FETAL BIOPHYS PROFILE W/NST: CPT | Mod: 26

## 2018-11-12 PROCEDURE — G0108 DIAB MANAGE TRN  PER INDIV: CPT

## 2018-11-12 PROCEDURE — 99213 OFFICE O/P EST LOW 20 MIN: CPT | Mod: 25

## 2018-11-12 PROCEDURE — 81003 URINALYSIS AUTO W/O SCOPE: CPT

## 2018-11-12 PROCEDURE — 86780 TREPONEMA PALLIDUM: CPT

## 2018-11-13 LAB
HCT VFR BLD CALC: 40.1 % — SIGNIFICANT CHANGE UP (ref 34.5–45)
HGB BLD-MCNC: 13.6 G/DL — SIGNIFICANT CHANGE UP (ref 11.5–15.5)
HIV 1+2 AB+HIV1 P24 AG SERPL QL IA: SIGNIFICANT CHANGE UP
MCHC RBC-ENTMCNC: 30.6 PG — SIGNIFICANT CHANGE UP (ref 27–34)
MCHC RBC-ENTMCNC: 33.9 GM/DL — SIGNIFICANT CHANGE UP (ref 32–36)
MCV RBC AUTO: 90.1 FL — SIGNIFICANT CHANGE UP (ref 80–100)
PLATELET # BLD AUTO: 291 K/UL — SIGNIFICANT CHANGE UP (ref 150–400)
RBC # BLD: 4.45 M/UL — SIGNIFICANT CHANGE UP (ref 3.8–5.2)
RBC # FLD: 13.9 % — SIGNIFICANT CHANGE UP (ref 10.3–14.5)
T PALLIDUM AB TITR SER: NEGATIVE — SIGNIFICANT CHANGE UP
WBC # BLD: 9.03 K/UL — SIGNIFICANT CHANGE UP (ref 3.8–10.5)
WBC # FLD AUTO: 9.03 K/UL — SIGNIFICANT CHANGE UP (ref 3.8–10.5)

## 2018-11-14 LAB
CULTURE RESULTS: SIGNIFICANT CHANGE UP
SPECIMEN SOURCE: SIGNIFICANT CHANGE UP

## 2018-11-16 ENCOUNTER — RESULT REVIEW (OUTPATIENT)
Age: 33
End: 2018-11-16

## 2018-11-16 ENCOUNTER — INPATIENT (INPATIENT)
Facility: HOSPITAL | Age: 33
LOS: 1 days | Discharge: HOME | End: 2018-11-18
Attending: OBSTETRICS & GYNECOLOGY | Admitting: OBSTETRICS & GYNECOLOGY
Payer: MEDICAID

## 2018-11-16 VITALS
HEART RATE: 88 BPM | TEMPERATURE: 98 F | DIASTOLIC BLOOD PRESSURE: 76 MMHG | SYSTOLIC BLOOD PRESSURE: 125 MMHG | OXYGEN SATURATION: 99 % | RESPIRATION RATE: 20 BRPM

## 2018-11-16 DIAGNOSIS — Z3A.00 WEEKS OF GESTATION OF PREGNANCY NOT SPECIFIED: ICD-10-CM

## 2018-11-16 DIAGNOSIS — O26.899 OTHER SPECIFIED PREGNANCY RELATED CONDITIONS, UNSPECIFIED TRIMESTER: ICD-10-CM

## 2018-11-16 DIAGNOSIS — Z90.49 ACQUIRED ABSENCE OF OTHER SPECIFIED PARTS OF DIGESTIVE TRACT: Chronic | ICD-10-CM

## 2018-11-16 DIAGNOSIS — Z34.80 ENCOUNTER FOR SUPERVISION OF OTHER NORMAL PREGNANCY, UNSPECIFIED TRIMESTER: ICD-10-CM

## 2018-11-16 LAB
BASOPHILS # BLD AUTO: 0.1 K/UL — SIGNIFICANT CHANGE UP (ref 0–0.2)
BASOPHILS NFR BLD AUTO: 0.8 % — SIGNIFICANT CHANGE UP (ref 0–2)
BLD GP AB SCN SERPL QL: NEGATIVE — SIGNIFICANT CHANGE UP
EOSINOPHIL # BLD AUTO: 0.2 K/UL — SIGNIFICANT CHANGE UP (ref 0–0.5)
EOSINOPHIL NFR BLD AUTO: 1.6 % — SIGNIFICANT CHANGE UP (ref 0–6)
HCT VFR BLD CALC: 39.3 % — SIGNIFICANT CHANGE UP (ref 34.5–45)
HGB BLD-MCNC: 13.6 G/DL — SIGNIFICANT CHANGE UP (ref 11.5–15.5)
LYMPHOCYTES # BLD AUTO: 3.6 K/UL — HIGH (ref 1–3.3)
LYMPHOCYTES # BLD AUTO: 30.2 % — SIGNIFICANT CHANGE UP (ref 13–44)
MCHC RBC-ENTMCNC: 30.3 PG — SIGNIFICANT CHANGE UP (ref 27–34)
MCHC RBC-ENTMCNC: 34.5 GM/DL — SIGNIFICANT CHANGE UP (ref 32–36)
MCV RBC AUTO: 87.6 FL — SIGNIFICANT CHANGE UP (ref 80–100)
MONOCYTES # BLD AUTO: 0.8 K/UL — SIGNIFICANT CHANGE UP (ref 0–0.9)
MONOCYTES NFR BLD AUTO: 6.7 % — SIGNIFICANT CHANGE UP (ref 2–14)
NEUTROPHILS # BLD AUTO: 7.2 K/UL — SIGNIFICANT CHANGE UP (ref 1.8–7.4)
NEUTROPHILS NFR BLD AUTO: 60.6 % — SIGNIFICANT CHANGE UP (ref 43–77)
PLATELET # BLD AUTO: 311 K/UL — SIGNIFICANT CHANGE UP (ref 150–400)
RBC # BLD: 4.48 M/UL — SIGNIFICANT CHANGE UP (ref 3.8–5.2)
RBC # FLD: 12.7 % — SIGNIFICANT CHANGE UP (ref 10.3–14.5)
RH IG SCN BLD-IMP: POSITIVE — SIGNIFICANT CHANGE UP
T PALLIDUM AB TITR SER: NEGATIVE — SIGNIFICANT CHANGE UP
WBC # BLD: 11.8 K/UL — HIGH (ref 3.8–10.5)
WBC # FLD AUTO: 11.8 K/UL — HIGH (ref 3.8–10.5)

## 2018-11-16 PROCEDURE — 59409 OBSTETRICAL CARE: CPT | Mod: U9,GC

## 2018-11-16 PROCEDURE — 88307 TISSUE EXAM BY PATHOLOGIST: CPT | Mod: 26

## 2018-11-16 RX ORDER — TETANUS TOXOID, REDUCED DIPHTHERIA TOXOID AND ACELLULAR PERTUSSIS VACCINE, ADSORBED 5; 2.5; 8; 8; 2.5 [IU]/.5ML; [IU]/.5ML; UG/.5ML; UG/.5ML; UG/.5ML
0.5 SUSPENSION INTRAMUSCULAR ONCE
Qty: 0 | Refills: 0 | Status: DISCONTINUED | OUTPATIENT
Start: 2018-11-16 | End: 2018-11-18

## 2018-11-16 RX ORDER — AMPICILLIN TRIHYDRATE 250 MG
2 CAPSULE ORAL ONCE
Qty: 0 | Refills: 0 | Status: DISCONTINUED | OUTPATIENT
Start: 2018-11-16 | End: 2018-11-16

## 2018-11-16 RX ORDER — OXYCODONE HYDROCHLORIDE 5 MG/1
5 TABLET ORAL EVERY 4 HOURS
Qty: 0 | Refills: 0 | Status: DISCONTINUED | OUTPATIENT
Start: 2018-11-16 | End: 2018-11-18

## 2018-11-16 RX ORDER — OXYTOCIN 10 UNIT/ML
333.33 VIAL (ML) INJECTION
Qty: 20 | Refills: 0 | Status: DISCONTINUED | OUTPATIENT
Start: 2018-11-16 | End: 2018-11-18

## 2018-11-16 RX ORDER — DOCUSATE SODIUM 100 MG
100 CAPSULE ORAL
Qty: 0 | Refills: 0 | Status: DISCONTINUED | OUTPATIENT
Start: 2018-11-16 | End: 2018-11-18

## 2018-11-16 RX ORDER — AER TRAVELER 0.5 G/1
1 SOLUTION RECTAL; TOPICAL EVERY 4 HOURS
Qty: 0 | Refills: 0 | Status: DISCONTINUED | OUTPATIENT
Start: 2018-11-16 | End: 2018-11-18

## 2018-11-16 RX ORDER — DIBUCAINE 1 %
1 OINTMENT (GRAM) RECTAL EVERY 4 HOURS
Qty: 0 | Refills: 0 | Status: DISCONTINUED | OUTPATIENT
Start: 2018-11-16 | End: 2018-11-18

## 2018-11-16 RX ORDER — PRAMOXINE HYDROCHLORIDE 150 MG/15G
1 AEROSOL, FOAM RECTAL EVERY 4 HOURS
Qty: 0 | Refills: 0 | Status: DISCONTINUED | OUTPATIENT
Start: 2018-11-16 | End: 2018-11-18

## 2018-11-16 RX ORDER — AMPICILLIN TRIHYDRATE 250 MG
1 CAPSULE ORAL EVERY 4 HOURS
Qty: 0 | Refills: 0 | Status: DISCONTINUED | OUTPATIENT
Start: 2018-11-16 | End: 2018-11-16

## 2018-11-16 RX ORDER — HYDROCORTISONE 1 %
1 OINTMENT (GRAM) TOPICAL EVERY 4 HOURS
Qty: 0 | Refills: 0 | Status: DISCONTINUED | OUTPATIENT
Start: 2018-11-16 | End: 2018-11-18

## 2018-11-16 RX ORDER — SODIUM CHLORIDE 9 MG/ML
1000 INJECTION INTRAMUSCULAR; INTRAVENOUS; SUBCUTANEOUS
Qty: 0 | Refills: 0 | Status: DISCONTINUED | OUTPATIENT
Start: 2018-11-16 | End: 2018-11-16

## 2018-11-16 RX ORDER — IBUPROFEN 200 MG
600 TABLET ORAL EVERY 6 HOURS
Qty: 0 | Refills: 0 | Status: DISCONTINUED | OUTPATIENT
Start: 2018-11-16 | End: 2018-11-18

## 2018-11-16 RX ORDER — ACETAMINOPHEN 500 MG
975 TABLET ORAL EVERY 6 HOURS
Qty: 0 | Refills: 0 | Status: DISCONTINUED | OUTPATIENT
Start: 2018-11-16 | End: 2018-11-18

## 2018-11-16 RX ORDER — OXYCODONE HYDROCHLORIDE 5 MG/1
5 TABLET ORAL
Qty: 0 | Refills: 0 | Status: DISCONTINUED | OUTPATIENT
Start: 2018-11-16 | End: 2018-11-18

## 2018-11-16 RX ORDER — GLYCERIN ADULT
1 SUPPOSITORY, RECTAL RECTAL AT BEDTIME
Qty: 0 | Refills: 0 | Status: DISCONTINUED | OUTPATIENT
Start: 2018-11-16 | End: 2018-11-18

## 2018-11-16 RX ORDER — SODIUM CHLORIDE 9 MG/ML
1000 INJECTION, SOLUTION INTRAVENOUS
Qty: 0 | Refills: 0 | Status: DISCONTINUED | OUTPATIENT
Start: 2018-11-16 | End: 2018-11-16

## 2018-11-16 RX ORDER — AMPICILLIN TRIHYDRATE 250 MG
CAPSULE ORAL
Qty: 0 | Refills: 0 | Status: DISCONTINUED | OUTPATIENT
Start: 2018-11-16 | End: 2018-11-16

## 2018-11-16 RX ORDER — LEVOTHYROXINE SODIUM 125 MCG
200 TABLET ORAL DAILY
Qty: 0 | Refills: 0 | Status: DISCONTINUED | OUTPATIENT
Start: 2018-11-16 | End: 2018-11-18

## 2018-11-16 RX ORDER — SIMETHICONE 80 MG/1
80 TABLET, CHEWABLE ORAL EVERY 6 HOURS
Qty: 0 | Refills: 0 | Status: DISCONTINUED | OUTPATIENT
Start: 2018-11-16 | End: 2018-11-18

## 2018-11-16 RX ORDER — LANOLIN
1 OINTMENT (GRAM) TOPICAL EVERY 6 HOURS
Qty: 0 | Refills: 0 | Status: DISCONTINUED | OUTPATIENT
Start: 2018-11-16 | End: 2018-11-18

## 2018-11-16 RX ORDER — KETOROLAC TROMETHAMINE 30 MG/ML
30 SYRINGE (ML) INJECTION ONCE
Qty: 0 | Refills: 0 | Status: DISCONTINUED | OUTPATIENT
Start: 2018-11-16 | End: 2018-11-18

## 2018-11-16 RX ORDER — OXYTOCIN 10 UNIT/ML
10 VIAL (ML) INJECTION ONCE
Qty: 0 | Refills: 0 | Status: DISCONTINUED | OUTPATIENT
Start: 2018-11-16 | End: 2018-11-18

## 2018-11-16 RX ORDER — MAGNESIUM HYDROXIDE 400 MG/1
30 TABLET, CHEWABLE ORAL
Qty: 0 | Refills: 0 | Status: DISCONTINUED | OUTPATIENT
Start: 2018-11-16 | End: 2018-11-18

## 2018-11-16 RX ORDER — IBUPROFEN 200 MG
600 TABLET ORAL EVERY 6 HOURS
Qty: 0 | Refills: 0 | Status: COMPLETED | OUTPATIENT
Start: 2018-11-16 | End: 2019-10-15

## 2018-11-16 RX ORDER — DIPHENHYDRAMINE HCL 50 MG
25 CAPSULE ORAL EVERY 6 HOURS
Qty: 0 | Refills: 0 | Status: DISCONTINUED | OUTPATIENT
Start: 2018-11-16 | End: 2018-11-18

## 2018-11-16 RX ORDER — OXYTOCIN 10 UNIT/ML
41.67 VIAL (ML) INJECTION
Qty: 20 | Refills: 0 | Status: DISCONTINUED | OUTPATIENT
Start: 2018-11-16 | End: 2018-11-18

## 2018-11-16 RX ORDER — PROGESTERONE 200 MG/1
0 CAPSULE, LIQUID FILLED ORAL
Qty: 0 | Refills: 0 | COMMUNITY

## 2018-11-16 RX ORDER — SODIUM CHLORIDE 9 MG/ML
3 INJECTION INTRAMUSCULAR; INTRAVENOUS; SUBCUTANEOUS EVERY 8 HOURS
Qty: 0 | Refills: 0 | Status: DISCONTINUED | OUTPATIENT
Start: 2018-11-16 | End: 2018-11-18

## 2018-11-16 RX ORDER — OXYTOCIN 10 UNIT/ML
10 VIAL (ML) INJECTION ONCE
Qty: 0 | Refills: 0 | Status: COMPLETED | OUTPATIENT
Start: 2018-11-16 | End: 2018-11-16

## 2018-11-16 RX ORDER — FAMOTIDINE 10 MG/ML
20 INJECTION INTRAVENOUS DAILY
Qty: 0 | Refills: 0 | Status: DISCONTINUED | OUTPATIENT
Start: 2018-11-16 | End: 2018-11-18

## 2018-11-16 RX ORDER — CITRIC ACID/SODIUM CITRATE 300-500 MG
15 SOLUTION, ORAL ORAL EVERY 4 HOURS
Qty: 0 | Refills: 0 | Status: DISCONTINUED | OUTPATIENT
Start: 2018-11-16 | End: 2018-11-16

## 2018-11-16 RX ORDER — OXYTOCIN 10 UNIT/ML
333.33 VIAL (ML) INJECTION
Qty: 20 | Refills: 0 | Status: DISCONTINUED | OUTPATIENT
Start: 2018-11-16 | End: 2018-11-16

## 2018-11-16 RX ORDER — ACETAMINOPHEN 500 MG
975 TABLET ORAL EVERY 6 HOURS
Qty: 0 | Refills: 0 | Status: COMPLETED | OUTPATIENT
Start: 2018-11-16 | End: 2019-10-15

## 2018-11-16 RX ORDER — OXYTOCIN 10 UNIT/ML
41.67 VIAL (ML) INJECTION
Qty: 20 | Refills: 0 | Status: DISCONTINUED | OUTPATIENT
Start: 2018-11-16 | End: 2018-11-16

## 2018-11-16 RX ORDER — KETOROLAC TROMETHAMINE 30 MG/ML
30 SYRINGE (ML) INJECTION ONCE
Qty: 0 | Refills: 0 | Status: DISCONTINUED | OUTPATIENT
Start: 2018-11-16 | End: 2018-11-16

## 2018-11-16 RX ADMIN — Medication 975 MILLIGRAM(S): at 09:51

## 2018-11-16 RX ADMIN — OXYCODONE HYDROCHLORIDE 5 MILLIGRAM(S): 5 TABLET ORAL at 14:45

## 2018-11-16 RX ADMIN — Medication 600 MILLIGRAM(S): at 17:49

## 2018-11-16 RX ADMIN — SODIUM CHLORIDE 3 MILLILITER(S): 9 INJECTION INTRAMUSCULAR; INTRAVENOUS; SUBCUTANEOUS at 14:44

## 2018-11-16 RX ADMIN — Medication 10 UNIT(S): at 05:59

## 2018-11-16 RX ADMIN — Medication 200 MICROGRAM(S): at 10:50

## 2018-11-16 RX ADMIN — OXYCODONE HYDROCHLORIDE 5 MILLIGRAM(S): 5 TABLET ORAL at 22:30

## 2018-11-16 RX ADMIN — Medication 975 MILLIGRAM(S): at 18:36

## 2018-11-16 RX ADMIN — Medication 975 MILLIGRAM(S): at 10:30

## 2018-11-16 RX ADMIN — OXYCODONE HYDROCHLORIDE 5 MILLIGRAM(S): 5 TABLET ORAL at 15:45

## 2018-11-16 RX ADMIN — OXYCODONE HYDROCHLORIDE 5 MILLIGRAM(S): 5 TABLET ORAL at 21:40

## 2018-11-16 RX ADMIN — Medication 600 MILLIGRAM(S): at 13:30

## 2018-11-16 RX ADMIN — Medication 1000 MILLIUNIT(S)/MIN: at 05:57

## 2018-11-16 RX ADMIN — Medication 30 MILLIGRAM(S): at 06:57

## 2018-11-16 RX ADMIN — Medication 600 MILLIGRAM(S): at 12:53

## 2018-11-16 RX ADMIN — Medication 1 TABLET(S): at 17:48

## 2018-11-16 RX ADMIN — Medication 216 GRAM(S): at 05:30

## 2018-11-16 RX ADMIN — SODIUM CHLORIDE 125 MILLILITER(S): 9 INJECTION, SOLUTION INTRAVENOUS at 05:31

## 2018-11-16 RX ADMIN — Medication 975 MILLIGRAM(S): at 17:49

## 2018-11-17 LAB
HCT VFR BLD CALC: 36 % — SIGNIFICANT CHANGE UP (ref 34.5–45)
HGB BLD-MCNC: 12.4 G/DL — SIGNIFICANT CHANGE UP (ref 11.5–15.5)

## 2018-11-17 RX ADMIN — Medication 975 MILLIGRAM(S): at 18:00

## 2018-11-17 RX ADMIN — Medication 600 MILLIGRAM(S): at 18:00

## 2018-11-17 RX ADMIN — Medication 600 MILLIGRAM(S): at 04:20

## 2018-11-17 RX ADMIN — Medication 975 MILLIGRAM(S): at 03:44

## 2018-11-17 RX ADMIN — SODIUM CHLORIDE 3 MILLILITER(S): 9 INJECTION INTRAMUSCULAR; INTRAVENOUS; SUBCUTANEOUS at 15:35

## 2018-11-17 RX ADMIN — Medication 600 MILLIGRAM(S): at 17:11

## 2018-11-17 RX ADMIN — OXYCODONE HYDROCHLORIDE 5 MILLIGRAM(S): 5 TABLET ORAL at 19:00

## 2018-11-17 RX ADMIN — OXYCODONE HYDROCHLORIDE 5 MILLIGRAM(S): 5 TABLET ORAL at 18:20

## 2018-11-17 RX ADMIN — SODIUM CHLORIDE 3 MILLILITER(S): 9 INJECTION INTRAMUSCULAR; INTRAVENOUS; SUBCUTANEOUS at 01:00

## 2018-11-17 RX ADMIN — Medication 1 TABLET(S): at 12:12

## 2018-11-17 RX ADMIN — SODIUM CHLORIDE 3 MILLILITER(S): 9 INJECTION INTRAMUSCULAR; INTRAVENOUS; SUBCUTANEOUS at 06:00

## 2018-11-17 RX ADMIN — Medication 200 MICROGRAM(S): at 07:39

## 2018-11-17 RX ADMIN — Medication 975 MILLIGRAM(S): at 04:20

## 2018-11-17 RX ADMIN — Medication 975 MILLIGRAM(S): at 09:40

## 2018-11-17 RX ADMIN — Medication 600 MILLIGRAM(S): at 09:07

## 2018-11-17 RX ADMIN — Medication 600 MILLIGRAM(S): at 09:40

## 2018-11-17 RX ADMIN — Medication 975 MILLIGRAM(S): at 17:11

## 2018-11-17 RX ADMIN — Medication 600 MILLIGRAM(S): at 03:45

## 2018-11-17 RX ADMIN — Medication 975 MILLIGRAM(S): at 09:07

## 2018-11-17 NOTE — PROGRESS NOTE ADULT - SUBJECTIVE AND OBJECTIVE BOX
OB Progress Note:  PPD#1    S: 32yo  PPD#1 s/p . Patient feels well. Pain is well controlled. She is tolerating a regular diet and passing flatus. She is voiding spontaneously, and ambulating without difficulty. Denies CP/SOB. Denies lightheadedness/dizziness. Denies N/V.    O:  Vitals:  Vital Signs Last 24 Hrs  T(C): 36.8 (2018 05:38), Max: 37 (2018 16:30)  T(F): 98.2 (2018 05:38), Max: 98.6 (2018 16:30)  HR: 89 (2018 05:38) (76 - 90)  BP: 109/71 (2018 05:38) (109/70 - 120/70)  BP(mean): --  RR: 17 (2018 05:38) (17 - 19)  SpO2: 100% (2018 05:38) (97% - 100%)    MEDICATIONS  (STANDING):  acetaminophen   Tablet .. 975 milliGRAM(s) Oral every 6 hours  acetaminophen   Tablet .. 975 milliGRAM(s) Oral every 6 hours  diphtheria/tetanus/pertussis (acellular) Vaccine (ADAcel) 0.5 milliLiter(s) IntraMuscular once  famotidine    Tablet 20 milliGRAM(s) Oral daily  ibuprofen  Tablet. 600 milliGRAM(s) Oral every 6 hours  ibuprofen  Tablet. 600 milliGRAM(s) Oral every 6 hours  ketorolac   Injectable 30 milliGRAM(s) IV Push once  levothyroxine 200 MICROGram(s) Oral daily  misoprostol 1000 MICROGram(s) Rectal once  oxyCODONE    IR 5 milliGRAM(s) Oral every 3 hours  oxyCODONE    IR 5 milliGRAM(s) Oral every 3 hours  oxytocin Infusion 41.667 milliUNIT(s)/Min (125 mL/Hr) IV Continuous <Continuous>  oxytocin Infusion 333.333 milliUNIT(s)/Min (1000 mL/Hr) IV Continuous <Continuous>  oxytocin Infusion 41.667 milliUNIT(s)/Min (125 mL/Hr) IV Continuous <Continuous>  oxytocin Injectable 10 Unit(s) IntraMuscular once  prenatal multivitamin 1 Tablet(s) Oral daily  sodium chloride 0.9% lock flush 3 milliLiter(s) IV Push every 8 hours  sodium chloride 0.9% lock flush 3 milliLiter(s) IV Push every 8 hours      Labs:  Blood type: A Positive  Rubella IgG: RPR: Negative                          12.4   -- >-----------< --    (  @ 06:00 )             36.0                        13.6   11.8<H> >-----------< 311    (  @ 05:55 )             39.3          Physical Exam:  General: NAD  Abdomen: soft, non-tender, non-distended, fundus firm  Vaginal: Lochia wnl  Extremities: No erythema/edema

## 2018-11-17 NOTE — PROGRESS NOTE ADULT - PROBLEM SELECTOR PLAN 1
- C/w Synthroid 200mcg QD   - Pain well controlled, continue current pain regimen  - Increase ambulation, SCDs when not ambulating  - Continue regular diet  - Standing colace

## 2018-11-18 ENCOUNTER — TRANSCRIPTION ENCOUNTER (OUTPATIENT)
Age: 33
End: 2018-11-18

## 2018-11-18 VITALS — WEIGHT: 194.89 LBS

## 2018-11-18 PROCEDURE — 59025 FETAL NON-STRESS TEST: CPT

## 2018-11-18 PROCEDURE — 86780 TREPONEMA PALLIDUM: CPT

## 2018-11-18 PROCEDURE — G0463: CPT

## 2018-11-18 PROCEDURE — 85027 COMPLETE CBC AUTOMATED: CPT

## 2018-11-18 PROCEDURE — 85018 HEMOGLOBIN: CPT

## 2018-11-18 PROCEDURE — 86900 BLOOD TYPING SEROLOGIC ABO: CPT

## 2018-11-18 PROCEDURE — 85014 HEMATOCRIT: CPT

## 2018-11-18 PROCEDURE — 59050 FETAL MONITOR W/REPORT: CPT

## 2018-11-18 PROCEDURE — 86850 RBC ANTIBODY SCREEN: CPT

## 2018-11-18 PROCEDURE — 86901 BLOOD TYPING SEROLOGIC RH(D): CPT

## 2018-11-18 PROCEDURE — 88307 TISSUE EXAM BY PATHOLOGIST: CPT

## 2018-11-18 RX ORDER — IBUPROFEN 200 MG
1 TABLET ORAL
Qty: 0 | Refills: 0 | DISCHARGE
Start: 2018-11-18

## 2018-11-18 RX ORDER — ACETAMINOPHEN 500 MG
3 TABLET ORAL
Qty: 0 | Refills: 0 | DISCHARGE
Start: 2018-11-18

## 2018-11-18 RX ORDER — METFORMIN HYDROCHLORIDE 850 MG/1
1 TABLET ORAL
Qty: 0 | Refills: 0 | COMMUNITY

## 2018-11-18 RX ORDER — LEVOTHYROXINE SODIUM 125 MCG
1 TABLET ORAL
Qty: 0 | Refills: 0 | COMMUNITY

## 2018-11-18 RX ADMIN — Medication 975 MILLIGRAM(S): at 07:15

## 2018-11-18 RX ADMIN — Medication 975 MILLIGRAM(S): at 06:28

## 2018-11-18 RX ADMIN — Medication 600 MILLIGRAM(S): at 12:00

## 2018-11-18 RX ADMIN — Medication 975 MILLIGRAM(S): at 14:00

## 2018-11-18 RX ADMIN — Medication 600 MILLIGRAM(S): at 11:28

## 2018-11-18 RX ADMIN — Medication 200 MICROGRAM(S): at 06:28

## 2018-11-18 RX ADMIN — Medication 1 TABLET(S): at 13:38

## 2018-11-18 RX ADMIN — Medication 975 MILLIGRAM(S): at 13:38

## 2018-11-18 NOTE — DIETITIAN INITIAL EVALUATION ADULT. - NS AS NUTRI INTERV ED CONTENT
Pt educated on postpartum dietary recommendations including: risk of development of T2DM, ways to reduce risk of developing T2DM and reinforced importance of DM screening 4-12 weeks postpartum. Pt verbalized good understanding. Written materials left at bedside.

## 2018-11-18 NOTE — DISCHARGE NOTE OB - HOSPITAL COURSE
Patient admitted in labor and had a normal spontaneous vaginal delivery. . Hct 39.3->36. Antepartum c/b GDMA2 and hypothyroidism. The patient met all appropriate post partum milestones.  The patient was able to void, tolerated a regular diet, and ambulated on her own.  The patient was discharged on PPD#2 afebrile, with stable vital signs, and appropriate pain control. Patient using condoms for birth control.

## 2018-11-18 NOTE — DIETITIAN INITIAL EVALUATION ADULT. - NS AS NUTRI INTERV MEALS SNACK
Continue to provide current diet order, no therapeutic diet restrictions indicated at this time./General/healthful diet

## 2018-11-18 NOTE — DISCHARGE NOTE OB - CARE PROVIDER_API CALL
[facility],   [address] [place] [address] [address],   [place] [address]  Phone: [phone]  Fax: (   )    -

## 2018-11-18 NOTE — DISCHARGE NOTE OB - CARE PLAN
Principal Discharge DX:	 (normal spontaneous vaginal delivery)  Goal:	Recovery  Assessment and plan of treatment:	After discharge, please stay on pelvic rest for 6 weeks, meaning no sexual intercourse, no tampons and no douching.  No driving for 2 weeks as women can loose a lot of blood during delivery and there is a possibility of being lightheaded/fainting.  No lifting objects heavier than baby for two weeks.  Expect to have vaginal bleeding/spotting for up to six weeks.  The bleeding should get lighter and more white/light brown with time.  For bleeding soaking more than a pad an hour or passing clots greater than the size of your fist, come in to the emergency department.    Follow up in clinic in 6 weeks.

## 2018-11-18 NOTE — PROGRESS NOTE ADULT - ATTENDING COMMENTS
ATTG note    I have read and agree with above PGY1 note    PPD#2 s/p  after presenting in labor    NO complaints.  Pain controlled with po pain meds.  OOB and ambulating.  Voiding freely. Denies heavy lochia.    VSS, afebrile  Gen-OOB, family at the bedside  Abd-fundus firm and below  Ext-no calf tend    PPD#1 H/h -     PPD#2 s/p  after presenting in active labor.  PNC c/b GDMa2 and hypothyroid.  Clinically stable for dc home today  -dc home with routine pp meds and to cont Synthoid  -desires condoms for contraception    SCOTT Doe

## 2018-11-18 NOTE — DISCHARGE NOTE OB - PROVIDER TOKENS
FREE:[LAST:[[place] [facility]],PHONE:[phone]],FAX:[(   [phone]    -],ADDRESS:[[address] [place] [address] [address],   [place] [address]]]

## 2018-11-18 NOTE — DIETITIAN INITIAL EVALUATION ADULT. - ENERGY NEEDS
Height: 63 inches, pre pregnancy Weight: 195 pounds  BMI: 34.5 kg/m2 IBW: 115 pounds (+/-10%), %IBW: 170%  Pertinent Info: No edema noted, no pressure injuries noted at this time in nursing flow sheet.  Other pertinent info: Pt is 33yoF PPD#2, . Pt with PMH significant for GDM with previous pregnancy, hypothyroidism.

## 2018-11-18 NOTE — DISCHARGE NOTE OB - MEDICATION SUMMARY - MEDICATIONS TO TAKE
I will START or STAY ON the medications listed below when I get home from the hospital:    acetaminophen 325 mg oral tablet  -- 3 tab(s) by mouth every 6 hours  -- Indication: For pain/cramping    ibuprofen 600 mg oral tablet  -- 1 tab(s) by mouth every 6 hours  -- Indication: For pain/cramping

## 2018-11-18 NOTE — DISCHARGE NOTE OB - PATIENT PORTAL LINK FT
You can access the [facility] Portal, offered by [facility], by registering with the following website: [email or web address]

## 2018-11-18 NOTE — PROGRESS NOTE ADULT - PROBLEM SELECTOR PLAN 1
- Pain well controlled, continue current pain regimen  - C/w synthroid   - Increase ambulation, SCDs when not ambulating  - Continue regular diet  - Discharge Planning

## 2018-11-18 NOTE — PROGRESS NOTE ADULT - SUBJECTIVE AND OBJECTIVE BOX
OB Progress Note:  PPD#2    S: 32yo PPD#2 s/p . Patient feels well. Pain is well controlled. She is tolerating a regular diet and passing flatus. She is voiding spontaneously, and ambulating without difficulty. She is breastfeeding. Denies CP/SOB. Denies lightheadedness/dizziness. Denies N/V.    O:  Vitals:  Vital Signs Last 24 Hrs  T(C): 36.6 (2018 05:46), Max: 37.1 (2018 01:23)  T(F): 97.9 (2018 05:46), Max: 98.8 (2018 01:23)  HR: 70 (2018 05:46) (70 - 81)  BP: 113/71 (2018 05:46) (111/72 - 119/80)  BP(mean): --  RR: 18 (2018 05:46) (18 - 18)  SpO2: 97% (2018 05:46) (95% - 97%)    MEDICATIONS  (STANDING):  acetaminophen   Tablet .. 975 milliGRAM(s) Oral every 6 hours  acetaminophen   Tablet .. 975 milliGRAM(s) Oral every 6 hours  diphtheria/tetanus/pertussis (acellular) Vaccine (ADAcel) 0.5 milliLiter(s) IntraMuscular once  famotidine    Tablet 20 milliGRAM(s) Oral daily  ibuprofen  Tablet. 600 milliGRAM(s) Oral every 6 hours  ibuprofen  Tablet. 600 milliGRAM(s) Oral every 6 hours  ketorolac   Injectable 30 milliGRAM(s) IV Push once  levothyroxine 200 MICROGram(s) Oral daily  misoprostol 1000 MICROGram(s) Rectal once  oxyCODONE    IR 5 milliGRAM(s) Oral every 3 hours  oxyCODONE    IR 5 milliGRAM(s) Oral every 3 hours  oxytocin Infusion 41.667 milliUNIT(s)/Min (125 mL/Hr) IV Continuous <Continuous>  oxytocin Infusion 333.333 milliUNIT(s)/Min (1000 mL/Hr) IV Continuous <Continuous>  oxytocin Infusion 41.667 milliUNIT(s)/Min (125 mL/Hr) IV Continuous <Continuous>  oxytocin Injectable 10 Unit(s) IntraMuscular once  prenatal multivitamin 1 Tablet(s) Oral daily  sodium chloride 0.9% lock flush 3 milliLiter(s) IV Push every 8 hours  sodium chloride 0.9% lock flush 3 milliLiter(s) IV Push every 8 hours      Labs:  Blood type: A Positive  Rubella IgG: RPR: Negative                          12.4   -- >-----------< --    (  @ 06:00 )             36.0                        13.6   11.8<H> >-----------< 311    (  @ 05:55 )             39.3          Physical Exam:  General: NAD  Abdomen: soft, non-tender, non-distended, fundus firm  Vaginal: Lochia wnl  Extremities: No erythema/edema

## 2018-11-19 ENCOUNTER — APPOINTMENT (OUTPATIENT)
Dept: ANTEPARTUM | Facility: CLINIC | Age: 33
End: 2018-11-19

## 2018-11-19 ENCOUNTER — APPOINTMENT (OUTPATIENT)
Dept: MATERNAL FETAL MEDICINE | Facility: CLINIC | Age: 33
End: 2018-11-19

## 2018-11-20 DIAGNOSIS — O24.415 GESTATIONAL DIABETES MELLITUS IN PREGNANCY, CONTROLLED BY ORAL HYPOGLYCEMIC DRUGS: ICD-10-CM

## 2018-11-20 DIAGNOSIS — O09.90 SUPERVISION OF HIGH RISK PREGNANCY, UNSPECIFIED, UNSPECIFIED TRIMESTER: ICD-10-CM

## 2018-11-20 DIAGNOSIS — O26.20 PREGNANCY CARE FOR PATIENT WITH RECURRENT PREGNANCY LOSS, UNSPECIFIED TRIMESTER: ICD-10-CM

## 2018-11-29 ENCOUNTER — OTHER (OUTPATIENT)
Age: 33
End: 2018-11-29

## 2018-12-28 ENCOUNTER — APPOINTMENT (OUTPATIENT)
Dept: MATERNAL FETAL MEDICINE | Facility: CLINIC | Age: 33
End: 2018-12-28
Payer: MEDICAID

## 2018-12-28 ENCOUNTER — OUTPATIENT (OUTPATIENT)
Dept: OUTPATIENT SERVICES | Facility: HOSPITAL | Age: 33
LOS: 1 days | End: 2018-12-28
Payer: COMMERCIAL

## 2018-12-28 VITALS — BODY MASS INDEX: 34 KG/M2 | DIASTOLIC BLOOD PRESSURE: 70 MMHG | SYSTOLIC BLOOD PRESSURE: 102 MMHG | WEIGHT: 195 LBS

## 2018-12-28 DIAGNOSIS — Z90.49 ACQUIRED ABSENCE OF OTHER SPECIFIED PARTS OF DIGESTIVE TRACT: Chronic | ICD-10-CM

## 2018-12-28 DIAGNOSIS — O09.899 SUPERVISION OF OTHER HIGH RISK PREGNANCIES, UNSPECIFIED TRIMESTER: ICD-10-CM

## 2018-12-28 PROCEDURE — G0463: CPT

## 2018-12-28 PROCEDURE — 99213 OFFICE O/P EST LOW 20 MIN: CPT | Mod: GE

## 2019-01-07 DIAGNOSIS — E03.9 HYPOTHYROIDISM, UNSPECIFIED: ICD-10-CM

## 2019-01-07 DIAGNOSIS — O24.419 GESTATIONAL DIABETES MELLITUS IN PREGNANCY, UNSPECIFIED CONTROL: ICD-10-CM

## 2019-01-17 ENCOUNTER — APPOINTMENT (OUTPATIENT)
Dept: MATERNAL FETAL MEDICINE | Facility: CLINIC | Age: 34
End: 2019-01-17

## 2019-02-09 ENCOUNTER — TRANSCRIPTION ENCOUNTER (OUTPATIENT)
Age: 34
End: 2019-02-09

## 2019-06-21 ENCOUNTER — MEDICATION RENEWAL (OUTPATIENT)
Age: 34
End: 2019-06-21

## 2019-06-25 ENCOUNTER — APPOINTMENT (OUTPATIENT)
Dept: INTERNAL MEDICINE | Facility: CLINIC | Age: 34
End: 2019-06-25
Payer: COMMERCIAL

## 2019-06-25 VITALS
WEIGHT: 198 LBS | BODY MASS INDEX: 34.65 KG/M2 | SYSTOLIC BLOOD PRESSURE: 110 MMHG | DIASTOLIC BLOOD PRESSURE: 76 MMHG | TEMPERATURE: 98.6 F | HEIGHT: 63.5 IN | OXYGEN SATURATION: 98 % | HEART RATE: 68 BPM

## 2019-06-25 PROCEDURE — 99395 PREV VISIT EST AGE 18-39: CPT | Mod: 25

## 2019-06-25 PROCEDURE — 36415 COLL VENOUS BLD VENIPUNCTURE: CPT

## 2019-06-25 RX ORDER — PROGESTERONE 200 MG/1
200 CAPSULE ORAL
Qty: 30 | Refills: 3 | Status: COMPLETED | COMMUNITY
Start: 2018-03-30 | End: 2019-06-25

## 2019-06-25 RX ORDER — ONDANSETRON 4 MG/1
4 TABLET, ORALLY DISINTEGRATING ORAL
Qty: 30 | Refills: 0 | Status: COMPLETED | COMMUNITY
Start: 2018-09-24 | End: 2019-06-25

## 2019-06-25 RX ORDER — METFORMIN ER 500 MG 500 MG/1
500 TABLET ORAL
Qty: 1 | Refills: 2 | Status: COMPLETED | COMMUNITY
Start: 2018-09-24 | End: 2019-06-25

## 2019-06-25 NOTE — HISTORY OF PRESENT ILLNESS
[FreeTextEntry1] : cpe [de-identified] : 34 year old F pmh of hypothyroidism here for cpe. Last visit in 2017. In 2018 she had her 2nd child after 13 miscarriages in between.\par has gained 10lbs since delivery\par mood is good, no longer nursing\par hx of mastitis at 3mo; pain at the nipple with temperature, completed course of keflex\par \par no other ER or UC visits\par now doing well, back to work. \par \par + weight gain. Remainder of ROS reviewed and found to be negative.\par

## 2019-06-25 NOTE — PHYSICAL EXAM
[de-identified] : Gen: Adult, NAD\par Head: NC/AT\par EENT: ears grossly normal, PERRL, EOMI, moist mucosa\par Neck: supple\par Chest wall: nontender\par CV: normal s1 +s2, rrr, no murmurs\par Pulm: CTA-B\par Abd: soft, NT, ND\par Skin: no rashes\par Back: no CVA tenderness, no spinal tenderness\par Neuro: gait normal, AAOx3\par Psych: normal affect, normal interaction\par

## 2019-06-25 NOTE — ASSESSMENT
[FreeTextEntry1] : 34 year old F here for cpe. Last visit in 2017. In 2018 she had her 2nd child after 13 miscarriages inbetween.\par \par Hypothyroidism - check tsh and free T4\par \par Seasonal allergies - will see an allergist\par \par HCM - cpe today\par            labs today\par            tdap in 2018\par            sees gyn\par            sees dentist\par

## 2019-06-28 ENCOUNTER — CLINICAL ADVICE (OUTPATIENT)
Age: 34
End: 2019-06-28

## 2019-06-28 LAB
25(OH)D3 SERPL-MCNC: 16.7 NG/ML
ALBUMIN SERPL ELPH-MCNC: 4.4 G/DL
ALP BLD-CCNC: 58 U/L
ALT SERPL-CCNC: 19 U/L
ANION GAP SERPL CALC-SCNC: 12 MMOL/L
AST SERPL-CCNC: 15 U/L
BASOPHILS # BLD AUTO: 0.08 K/UL
BASOPHILS NFR BLD AUTO: 0.9 %
BILIRUB SERPL-MCNC: 0.4 MG/DL
BUN SERPL-MCNC: 15 MG/DL
CALCIUM SERPL-MCNC: 9.6 MG/DL
CHLORIDE SERPL-SCNC: 104 MMOL/L
CHOLEST SERPL-MCNC: 153 MG/DL
CHOLEST/HDLC SERPL: 3.6 RATIO
CO2 SERPL-SCNC: 23 MMOL/L
CREAT SERPL-MCNC: 0.68 MG/DL
EOSINOPHIL # BLD AUTO: 0.3 K/UL
EOSINOPHIL NFR BLD AUTO: 3.4 %
ESTIMATED AVERAGE GLUCOSE: 111 MG/DL
GLUCOSE SERPL-MCNC: 96 MG/DL
HBA1C MFR BLD HPLC: 5.5 %
HCT VFR BLD CALC: 39.8 %
HDLC SERPL-MCNC: 43 MG/DL
HGB BLD-MCNC: 13.2 G/DL
IMM GRANULOCYTES NFR BLD AUTO: 0.2 %
LDLC SERPL CALC-MCNC: 93 MG/DL
LYMPHOCYTES # BLD AUTO: 3.31 K/UL
LYMPHOCYTES NFR BLD AUTO: 37.2 %
M TB IFN-G BLD-IMP: NEGATIVE
MAN DIFF?: NORMAL
MCHC RBC-ENTMCNC: 30.2 PG
MCHC RBC-ENTMCNC: 33.2 GM/DL
MCV RBC AUTO: 91.1 FL
MEV IGG FLD QL IA: >300 AU/ML
MEV IGG+IGM SER-IMP: POSITIVE
MONOCYTES # BLD AUTO: 0.64 K/UL
MONOCYTES NFR BLD AUTO: 7.2 %
MUV AB SER-ACNC: POSITIVE
MUV IGG SER QL IA: 62.1 AU/ML
NEUTROPHILS # BLD AUTO: 4.54 K/UL
NEUTROPHILS NFR BLD AUTO: 51.1 %
PLATELET # BLD AUTO: 299 K/UL
POTASSIUM SERPL-SCNC: 4.5 MMOL/L
PROT SERPL-MCNC: 7.4 G/DL
QUANTIFERON TB PLUS MITOGEN MINUS NIL: >10 IU/ML
QUANTIFERON TB PLUS NIL: 0.04 IU/ML
QUANTIFERON TB PLUS TB1 MINUS NIL: 0.01 IU/ML
QUANTIFERON TB PLUS TB2 MINUS NIL: 0 IU/ML
RBC # BLD: 4.37 M/UL
RBC # FLD: 12.6 %
SODIUM SERPL-SCNC: 139 MMOL/L
T4 FREE SERPL-MCNC: 1.1 NG/DL
TRIGL SERPL-MCNC: 83 MG/DL
TSH SERPL-ACNC: 6.45 UIU/ML
WBC # FLD AUTO: 8.89 K/UL

## 2019-07-31 ENCOUNTER — APPOINTMENT (OUTPATIENT)
Dept: ALLERGY | Facility: CLINIC | Age: 34
End: 2019-07-31
Payer: COMMERCIAL

## 2019-07-31 VITALS
OXYGEN SATURATION: 98 % | HEART RATE: 71 BPM | RESPIRATION RATE: 16 BRPM | HEIGHT: 63.5 IN | WEIGHT: 198 LBS | DIASTOLIC BLOOD PRESSURE: 80 MMHG | SYSTOLIC BLOOD PRESSURE: 100 MMHG | BODY MASS INDEX: 34.65 KG/M2

## 2019-07-31 PROCEDURE — 99204 OFFICE O/P NEW MOD 45 MIN: CPT | Mod: 25

## 2019-07-31 PROCEDURE — 95018 ALL TSTG PERQ&IQ DRUGS/BIOL: CPT

## 2019-07-31 PROCEDURE — 95004 PERQ TESTS W/ALRGNC XTRCS: CPT

## 2019-07-31 PROCEDURE — 94060 EVALUATION OF WHEEZING: CPT

## 2019-07-31 RX ORDER — MONTELUKAST SODIUM 10 MG/1
10 TABLET, FILM COATED ORAL DAILY
Qty: 90 | Refills: 1 | Status: DISCONTINUED | COMMUNITY
Start: 2017-10-13 | End: 2019-07-31

## 2019-07-31 RX ORDER — FAMOTIDINE 20 MG/1
20 TABLET, FILM COATED ORAL
Qty: 30 | Refills: 2 | Status: DISCONTINUED | COMMUNITY
Start: 2018-08-20 | End: 2019-07-31

## 2019-07-31 RX ORDER — LEVOTHYROXINE SODIUM 0.2 MG/1
200 TABLET ORAL DAILY
Qty: 30 | Refills: 3 | Status: DISCONTINUED | COMMUNITY
Start: 2018-09-24 | End: 2019-07-31

## 2019-07-31 RX ORDER — URINE ACETONE TEST STRIPS
STRIP MISCELLANEOUS
Qty: 1 | Refills: 1 | Status: DISCONTINUED | COMMUNITY
Start: 2018-08-20 | End: 2019-07-31

## 2019-07-31 RX ORDER — DOXYLAMINE SUCCINATE 25 MG
25 TABLET ORAL
Qty: 1 | Refills: 1 | Status: DISCONTINUED | COMMUNITY
Start: 2018-04-16 | End: 2019-07-31

## 2019-07-31 RX ORDER — LORATADINE 10 MG
28-0.8 TABLET ORAL DAILY
Qty: 30 | Refills: 1 | Status: DISCONTINUED | COMMUNITY
Start: 2018-08-20 | End: 2019-07-31

## 2019-07-31 NOTE — SOCIAL HISTORY
[Spouse/Partner] : spouse/partner [House] : [unfilled] lives in a house  [Central] : air conditioning provided by central unit [None] : none [] :  [de-identified] : 2 chiidren [FreeTextEntry2] : PT  in school -  at [employer] [Bedroom] : not in the bedroom [Basement] : not in the basement [Living Area] : not in the living area [Smokers in Household] : there are no smokers in the home [de-identified] : electric heat

## 2019-07-31 NOTE — HISTORY OF PRESENT ILLNESS
[Eczematous rashes] : eczematous rashes [Venom Reactions] : venom reactions [Food Allergies] : food allergies [de-identified] : Patient having ocular itching, water eyes, itchy nose, stuffy and runny nose - slight wheeze with worsening of her allergies.   She has been taking Claritin - Flonase makes her nose itchier.   She consulted allergist 5 years ago - IT x few months then she stopped treatment.\par \par Born in [place].

## 2019-07-31 NOTE — ASSESSMENT
[FreeTextEntry1] : Perennial allergic rhinoconjunctivitis:\par \par Azelastine BID \par Optivar BID \par RV environmental intradermal skin testing \par \par Mild intermittent asthma:\par \par Proair 2 puffs QID prn

## 2019-07-31 NOTE — PHYSICAL EXAM
[Alert] : alert [Well Nourished] : well nourished [Healthy Appearance] : healthy appearance [No Acute Distress] : no acute distress [Well Developed] : well developed [Normal Pupil & Iris Size/Symmetry] : normal pupil and iris size and symmetry [No Discharge] : no discharge [No Photophobia] : no photophobia [Sclera Not Icteric] : sclera not icteric [Normal Nasal Mucosa] : the nasal mucosa was normal [Normal Lips/Tongue] : the lips and tongue were normal [Normal Tonsils] : normal tonsils [No Oral Lesions or Ulcers] : no oral lesions or ulcers [Normal Dentition] : normal dentition [Boggy Nasal Turbinates] : boggy and/or pale nasal turbinates [Posterior Pharyngeal Cobblestoning] : posterior pharyngeal cobblestoning [No LAD] : no lymphadenopathy [No Neck Mass] : no neck mass was observed [No Thyroid Mass] : no thyroid mass [Supple] : the neck was supple [Normal Rate and Effort] : normal respiratory rhythm and effort [No Crackles] : no crackles [Bilateral Audible Breath Sounds] : bilateral audible breath sounds [Normal Rate] : heart rate was normal  [No murmur] : no murmur [Normal S1, S2] : normal S1 and S2 [Regular Rhythm] : with a regular rhythm [Normal Axillary Lumph Nodes] : axillary [Skin Intact] : skin intact  [Normal Cervical Lymph Nodes] : cervical [No Rash] : no rash [No Skin Lesions] : no skin lesions [No Joint Swelling or Erythema] : no joint swelling or erythema [No clubbing] : no clubbing [No Edema] : no edema [No Cyanosis] : no cyanosis [Normal Mood] : mood was normal [Normal Affect] : affect was normal [Alert, Awake, Oriented as Age-Appropriate] : alert, awake, oriented as age appropriate

## 2019-07-31 NOTE — IMPRESSION
[Spirometry] : Spirometry [FreeTextEntry1] : decrease FVC and FEV1 secondary to effort  [Allergy Testing Dust Mite] : dust mites [Allergy Testing Cat] : cat [Allergy Testing Trees] : trees [Allergy Testing Cockroach] : cockroach [Allergy Testing Dog] : dog [] : molds [Allergy Testing Weeds] : weeds [Allergy Testing Grasses] : grasses

## 2019-08-06 ENCOUNTER — APPOINTMENT (OUTPATIENT)
Dept: ALLERGY | Facility: CLINIC | Age: 34
End: 2019-08-06
Payer: COMMERCIAL

## 2019-08-06 PROCEDURE — 95024 IQ TESTS W/ALLERGENIC XTRCS: CPT

## 2019-08-06 PROCEDURE — 95018 ALL TSTG PERQ&IQ DRUGS/BIOL: CPT

## 2019-08-14 ENCOUNTER — MEDICATION RENEWAL (OUTPATIENT)
Age: 34
End: 2019-08-14

## 2019-08-29 ENCOUNTER — APPOINTMENT (OUTPATIENT)
Dept: ALLERGY | Facility: CLINIC | Age: 34
End: 2019-08-29
Payer: COMMERCIAL

## 2019-08-29 PROCEDURE — 95117 IMMUNOTHERAPY INJECTIONS: CPT

## 2019-09-03 ENCOUNTER — MEDICATION RENEWAL (OUTPATIENT)
Age: 34
End: 2019-09-03

## 2019-09-05 ENCOUNTER — APPOINTMENT (OUTPATIENT)
Dept: ENDOCRINOLOGY | Facility: CLINIC | Age: 34
End: 2019-09-05
Payer: COMMERCIAL

## 2019-09-05 VITALS
OXYGEN SATURATION: 97 % | BODY MASS INDEX: 34.65 KG/M2 | WEIGHT: 198 LBS | SYSTOLIC BLOOD PRESSURE: 108 MMHG | DIASTOLIC BLOOD PRESSURE: 70 MMHG | HEIGHT: 63.5 IN | HEART RATE: 81 BPM

## 2019-09-05 DIAGNOSIS — Z83.3 FAMILY HISTORY OF DIABETES MELLITUS: ICD-10-CM

## 2019-09-05 DIAGNOSIS — E04.1 NONTOXIC SINGLE THYROID NODULE: ICD-10-CM

## 2019-09-05 DIAGNOSIS — M25.50 PAIN IN UNSPECIFIED JOINT: ICD-10-CM

## 2019-09-05 PROCEDURE — 36415 COLL VENOUS BLD VENIPUNCTURE: CPT

## 2019-09-05 PROCEDURE — 99204 OFFICE O/P NEW MOD 45 MIN: CPT | Mod: 25

## 2019-09-05 PROCEDURE — 76536 US EXAM OF HEAD AND NECK: CPT

## 2019-09-05 RX ORDER — ALBUTEROL SULFATE 90 UG/1
108 (90 BASE) AEROSOL, METERED RESPIRATORY (INHALATION)
Qty: 1 | Refills: 2 | Status: DISCONTINUED | COMMUNITY
Start: 2019-07-31 | End: 2019-09-05

## 2019-09-05 NOTE — HISTORY OF PRESENT ILLNESS
[FreeTextEntry1] : Ms. MONREAL  is a 34 year  year old female  who presents for initial endocrine evaluation. She presents with regard to a history of hypothyroidsm. She is here for jeremy chavez.  Saw endocrinologist at first pregnancy about 7 years ago.  Dose of LT4 did increase to 225 about 2-3  months ago. Both her mother and sister with hypothyroidism,.  Additional medical history includes that of cholecystectomy.  too had 13 pregnancies one ectopic, one top then all other chemical miscarriages. Work up for miscarriages said to be negative. DId thoiug have low progest and was on progest with latest preg.\par Does have a 7 year old and a 9 month old. \par \par If dose is to low-she puts on weight dn becomes more fatigued.\par Incr hair loss over past few months-in past tried biotin without benefit.\par Recent birth wt 6 lb 14 oz. at 39 weeks.\par Had gestational diabetes x2 tx with glyburide first pregnancy and metformin with her latest pregnancy.\par

## 2019-09-05 NOTE — PHYSICAL EXAM
[Alert] : alert [No Acute Distress] : no acute distress [Well Nourished] : well nourished [Well Developed] : well developed [Normal Sclera/Conjunctiva] : normal sclera/conjunctiva [No Proptosis] : no proptosis [EOMI] : extra ocular movement intact [Normal Oropharynx] : the oropharynx was normal [Thyroid Not Enlarged] : the thyroid was not enlarged [No Thyroid Nodules] : there were no palpable thyroid nodules [No Accessory Muscle Use] : no accessory muscle use [No Respiratory Distress] : no respiratory distress [Clear to Auscultation] : lungs were clear to auscultation bilaterally [Normal S1, S2] : normal S1 and S2 [Normal Rate] : heart rate was normal  [Regular Rhythm] : with a regular rhythm [Pedal Pulses Normal] : the pedal pulses are present [Normal Bowel Sounds] : normal bowel sounds [No Edema] : there was no peripheral edema [Not Tender] : non-tender [Soft] : abdomen soft [Not Distended] : not distended [Anterior Cervical Nodes] : anterior cervical nodes [Post Cervical Nodes] : posterior cervical nodes [Axillary Nodes] : axillary nodes [Normal] : normal and non tender [No Spinal Tenderness] : no spinal tenderness [Spine Straight] : spine straight [No Stigmata of Cushings Syndrome] : no stigmata of cushings syndrome [Normal Gait] : normal gait [Normal Strength/Tone] : muscle strength and tone were normal [No Rash] : no rash [Acanthosis Nigricans] : no acanthosis nigricans [Normal Reflexes] : deep tendon reflexes were 2+ and symmetric [No Tremors] : no tremors [Oriented x3] : oriented to person, place, and time

## 2019-09-05 NOTE — IMPRESSION
[FreeTextEntry1] : Heterogenous thyroid bilaterally without distinct nodularity.  I have discussed this finding with the patient and have suggested  thyroid ultrasound follow up in 9-12 months, or, as needed sooner.\par

## 2019-09-05 NOTE — PROCEDURE
[[employer] e 2008 model, 10-12 MHz frequencies] : multiple real time longitudinal and transverse images were obtained using a high resolution ultrasound with a linear transducer, [employer] e 2008 model, 10-12 MHz frequencies. All measurements will be reported as longitudinal x dariana-posterior x transverse. [Hypothyroidism] : hypothyroidism [] : a heterogeneous parenchyma  [There are no distinct nodules visualized.] : There are no distinct nodules visualized. [FreeTextEntry1] : 3..24 x 1.92 x 1.72 [FreeTextEntry5] : 2.39 x 1.73 x 1.50 [FreeTextEntry2] : 0.24

## 2019-09-05 NOTE — PROCEDURE
[[employer] e 2008 model, 10-12 MHz frequencies] : multiple real time longitudinal and transverse images were obtained using a high resolution ultrasound with a linear transducer, [employer] e 2008 model, 10-12 MHz frequencies. All measurements will be reported as longitudinal x dariana-posterior x transverse. [Hypothyroidism] : hypothyroidism [] : a heterogeneous parenchyma  [There are no distinct nodules visualized.] : There are no distinct nodules visualized. [FreeTextEntry1] : 3..24 x 1.92 x 1.72 [FreeTextEntry5] : 2.39 x 1.73 x 1.50 [FreeTextEntry2] : 0.24

## 2019-09-05 NOTE — PHYSICAL EXAM
[Alert] : alert [Well Nourished] : well nourished [No Acute Distress] : no acute distress [Well Developed] : well developed [Normal Sclera/Conjunctiva] : normal sclera/conjunctiva [EOMI] : extra ocular movement intact [No Proptosis] : no proptosis [Normal Oropharynx] : the oropharynx was normal [Thyroid Not Enlarged] : the thyroid was not enlarged [No Thyroid Nodules] : there were no palpable thyroid nodules [No Respiratory Distress] : no respiratory distress [No Accessory Muscle Use] : no accessory muscle use [Clear to Auscultation] : lungs were clear to auscultation bilaterally [Normal Rate] : heart rate was normal  [Normal S1, S2] : normal S1 and S2 [Pedal Pulses Normal] : the pedal pulses are present [Regular Rhythm] : with a regular rhythm [Normal Bowel Sounds] : normal bowel sounds [No Edema] : there was no peripheral edema [Soft] : abdomen soft [Not Tender] : non-tender [Not Distended] : not distended [Anterior Cervical Nodes] : anterior cervical nodes [Post Cervical Nodes] : posterior cervical nodes [Axillary Nodes] : axillary nodes [Normal] : normal and non tender [No Spinal Tenderness] : no spinal tenderness [Spine Straight] : spine straight [No Stigmata of Cushings Syndrome] : no stigmata of cushings syndrome [Normal Gait] : normal gait [Normal Strength/Tone] : muscle strength and tone were normal [No Rash] : no rash [Acanthosis Nigricans] : no acanthosis nigricans [Normal Reflexes] : deep tendon reflexes were 2+ and symmetric [No Tremors] : no tremors [Oriented x3] : oriented to person, place, and time

## 2019-09-05 NOTE — PROCEDURE
[[employer] e 2008 model, 10-12 MHz frequencies] : multiple real time longitudinal and transverse images were obtained using a high resolution ultrasound with a linear transducer, [employer] e 2008 model, 10-12 MHz frequencies. All measurements will be reported as longitudinal x dariana-posterior x transverse. [Hypothyroidism] : hypothyroidism [] : a heterogeneous parenchyma  [There are no distinct nodules visualized.] : There are no distinct nodules visualized. [FreeTextEntry1] : 3..24 x 1.92 x 1.72 [FreeTextEntry5] : 2.39 x 1.73 x 1.50 [FreeTextEntry2] : 0.24

## 2019-09-12 ENCOUNTER — APPOINTMENT (OUTPATIENT)
Dept: ALLERGY | Facility: CLINIC | Age: 34
End: 2019-09-12
Payer: COMMERCIAL

## 2019-09-12 PROCEDURE — 95117 IMMUNOTHERAPY INJECTIONS: CPT

## 2019-09-13 LAB
25(OH)D3 SERPL-MCNC: 16.3 NG/ML
ALBUMIN SERPL ELPH-MCNC: 4.3 G/DL
ALP BLD-CCNC: 58 U/L
ALT SERPL-CCNC: 19 U/L
ANION GAP SERPL CALC-SCNC: 10 MMOL/L
AST SERPL-CCNC: 14 U/L
BASOPHILS # BLD AUTO: 0.07 K/UL
BASOPHILS NFR BLD AUTO: 1 %
BILIRUB SERPL-MCNC: <0.2 MG/DL
BUN SERPL-MCNC: 17 MG/DL
CALCIUM SERPL-MCNC: 9.7 MG/DL
CHLORIDE SERPL-SCNC: 105 MMOL/L
CO2 SERPL-SCNC: 27 MMOL/L
CREAT SERPL-MCNC: 0.72 MG/DL
EOSINOPHIL # BLD AUTO: 0.41 K/UL
EOSINOPHIL NFR BLD AUTO: 5.9 %
GLUCOSE SERPL-MCNC: 97 MG/DL
HCT VFR BLD CALC: 37.9 %
HGB BLD-MCNC: 12.7 G/DL
IMM GRANULOCYTES NFR BLD AUTO: 0.3 %
LYMPHOCYTES # BLD AUTO: 2.64 K/UL
LYMPHOCYTES NFR BLD AUTO: 38 %
MAN DIFF?: NORMAL
MCHC RBC-ENTMCNC: 30.2 PG
MCHC RBC-ENTMCNC: 33.5 GM/DL
MCV RBC AUTO: 90 FL
MONOCYTES # BLD AUTO: 0.54 K/UL
MONOCYTES NFR BLD AUTO: 7.8 %
NEUTROPHILS # BLD AUTO: 3.27 K/UL
NEUTROPHILS NFR BLD AUTO: 47 %
PLATELET # BLD AUTO: 346 K/UL
POTASSIUM SERPL-SCNC: 4.5 MMOL/L
PROT SERPL-MCNC: 7.3 G/DL
RBC # BLD: 4.21 M/UL
RBC # FLD: 12.2 %
SODIUM SERPL-SCNC: 142 MMOL/L
T3FREE SERPL-MCNC: 2.52 PG/ML
T4 FREE SERPL-MCNC: 1.5 NG/DL
THYROGLOB AB SERPL-ACNC: 69.4 IU/ML
THYROPEROXIDASE AB SERPL IA-ACNC: 118 IU/ML
TSH SERPL-ACNC: 0.2 UIU/ML
WBC # FLD AUTO: 6.95 K/UL

## 2019-09-16 ENCOUNTER — RX RENEWAL (OUTPATIENT)
Age: 34
End: 2019-09-16

## 2019-09-17 ENCOUNTER — APPOINTMENT (OUTPATIENT)
Dept: ALLERGY | Facility: CLINIC | Age: 34
End: 2019-09-17
Payer: COMMERCIAL

## 2019-09-17 PROCEDURE — 95117 IMMUNOTHERAPY INJECTIONS: CPT

## 2019-09-27 NOTE — CONSULT LETTER
[Dear  ___] : Dear  [unfilled], [Thank you for referring [unfilled] for consultation for _____] : Thank you for referring [unfilled] for consultation for [unfilled] [Please see my note below.] : Please see my note below. [Sincerely,] : Sincerely, [FreeTextEntry3] : Mitchell B. Boxer, M.D., FAAAAI\par [facility]\par \par Department of Allergy-Immunology\par [facility] \par [address] [place] [address]\par [place] [address]\par Tel:   [phone]\par Fax:  [phone]\par Email: [email or web address]\par

## 2019-09-27 NOTE — ASSESSMENT
[FreeTextEntry1] : Seasonal and perennial allergic rhinoconjunctivitis:\par \par Azelastine BID\par Optivar BID prn \par Allegra 180 mg QD\par \par 1. I have reviewed the treatment option of allergy immunotherapy.\par 2. I have reviewed allergen avoidance measures in order to reduce exposure to pertinent allergens.\par 3. IT QA booklet given to patient detailing allergy immunotherapy\par 4. I have recommended medical therapy for patient's allergy symptoms as outlined below. \par \par

## 2019-10-01 ENCOUNTER — APPOINTMENT (OUTPATIENT)
Dept: INTERNAL MEDICINE | Facility: CLINIC | Age: 34
End: 2019-10-01

## 2019-10-10 ENCOUNTER — APPOINTMENT (OUTPATIENT)
Dept: ALLERGY | Facility: CLINIC | Age: 34
End: 2019-10-10
Payer: COMMERCIAL

## 2019-10-10 PROCEDURE — 95117 IMMUNOTHERAPY INJECTIONS: CPT

## 2019-10-24 ENCOUNTER — APPOINTMENT (OUTPATIENT)
Dept: ALLERGY | Facility: CLINIC | Age: 34
End: 2019-10-24
Payer: COMMERCIAL

## 2019-10-24 PROCEDURE — 95117 IMMUNOTHERAPY INJECTIONS: CPT

## 2019-10-31 ENCOUNTER — APPOINTMENT (OUTPATIENT)
Dept: ALLERGY | Facility: CLINIC | Age: 34
End: 2019-10-31
Payer: COMMERCIAL

## 2019-10-31 PROCEDURE — 95117 IMMUNOTHERAPY INJECTIONS: CPT

## 2019-12-05 ENCOUNTER — APPOINTMENT (OUTPATIENT)
Dept: ALLERGY | Facility: CLINIC | Age: 34
End: 2019-12-05
Payer: COMMERCIAL

## 2019-12-05 PROCEDURE — 95117 IMMUNOTHERAPY INJECTIONS: CPT

## 2019-12-10 ENCOUNTER — APPOINTMENT (OUTPATIENT)
Dept: ALLERGY | Facility: CLINIC | Age: 34
End: 2019-12-10
Payer: COMMERCIAL

## 2019-12-10 PROCEDURE — 95117 IMMUNOTHERAPY INJECTIONS: CPT

## 2019-12-12 ENCOUNTER — ASOB RESULT (OUTPATIENT)
Age: 34
End: 2019-12-12

## 2019-12-12 ENCOUNTER — APPOINTMENT (OUTPATIENT)
Dept: OBGYN | Facility: CLINIC | Age: 34
End: 2019-12-12
Payer: COMMERCIAL

## 2019-12-12 VITALS
SYSTOLIC BLOOD PRESSURE: 128 MMHG | WEIGHT: 195 LBS | DIASTOLIC BLOOD PRESSURE: 87 MMHG | BODY MASS INDEX: 34.12 KG/M2 | HEIGHT: 63.5 IN | HEART RATE: 89 BPM

## 2019-12-12 PROCEDURE — 99202 OFFICE O/P NEW SF 15 MIN: CPT

## 2019-12-12 PROCEDURE — 76817 TRANSVAGINAL US OBSTETRIC: CPT

## 2019-12-12 RX ORDER — CHOLECALCIFEROL (VITAMIN D3) 1250 MCG
1.25 MG CAPSULE ORAL
Qty: 12 | Refills: 1 | Status: DISCONTINUED | COMMUNITY
Start: 2019-09-16 | End: 2019-12-12

## 2019-12-12 NOTE — COUNSELING
[Vitamins/Supplements] : vitamins/supplements [Exercise] : exercise [Nutrition] : nutrition [HIV Pretest] : HIV pretest

## 2019-12-12 NOTE — HISTORY OF PRESENT ILLNESS
[Last Pap ___] : Last cervical pap smear was [unfilled] [Regular Cycle Intervals] : periods have been regular [Patient travelled to an area with active Zika Virus transmission during pregnancy or 8 weeks before getting pregnant] : Patient stated she did not travel to an area with active Zika virus transmission during pregnancy or 8 weeks before getting pregnant [Patient had sex without a condom with a man who traveled to, or reside in, an area with active Zika Virus transmission during pregnancy] : Patient did not have sex without a condom with a man who traveled to, or reside in, an area with active Zika Virus transmission during pregnancy

## 2019-12-12 NOTE — PHYSICAL EXAM
[Alert] : alert [Awake] : awake [Soft] : soft [Oriented x3] : oriented to person, place, and time [Normal] : cervix [No Bleeding] : there was no active vaginal bleeding [Uterine Adnexae] : were not tender and not enlarged [Acute Distress] : no acute distress [Goiter] : no goiter [LAD] : no lymphadenopathy [Thyroid Nodule] : no thyroid nodule [Nipple Discharge] : no nipple discharge [Mass] : no breast mass [Tender] : non tender [Axillary LAD] : no axillary lymphadenopathy [Distended] : not distended

## 2019-12-16 LAB
ABO + RH PNL BLD: NORMAL
BACTERIA UR CULT: NORMAL
BASOPHILS # BLD AUTO: 0.08 K/UL
BASOPHILS NFR BLD AUTO: 0.8 %
BLD GP AB SCN SERPL QL: NORMAL
C TRACH RRNA SPEC QL NAA+PROBE: NOT DETECTED
CMV IGG SERPL QL: >10 U/ML
CMV IGG SERPL-IMP: POSITIVE
CMV IGM SERPL QL: <8 AU/ML
CMV IGM SERPL QL: NEGATIVE
EOSINOPHIL # BLD AUTO: 0.37 K/UL
EOSINOPHIL NFR BLD AUTO: 3.7 %
HBV SURFACE AG SER QL: NONREACTIVE
HCT VFR BLD CALC: 39.6 %
HCV AB SER QL: NONREACTIVE
HCV S/CO RATIO: 0.14 S/CO
HGB BLD-MCNC: 13.2 G/DL
HIV1+2 AB SPEC QL IA.RAPID: NONREACTIVE
IMM GRANULOCYTES NFR BLD AUTO: 0.4 %
LEAD BLD-MCNC: 2 UG/DL
LYMPHOCYTES # BLD AUTO: 3.04 K/UL
LYMPHOCYTES NFR BLD AUTO: 30.3 %
MAN DIFF?: NORMAL
MCHC RBC-ENTMCNC: 29.7 PG
MCHC RBC-ENTMCNC: 33.3 GM/DL
MCV RBC AUTO: 89.2 FL
MONOCYTES # BLD AUTO: 0.8 K/UL
MONOCYTES NFR BLD AUTO: 8 %
N GONORRHOEA RRNA SPEC QL NAA+PROBE: NOT DETECTED
NEUTROPHILS # BLD AUTO: 5.7 K/UL
NEUTROPHILS NFR BLD AUTO: 56.8 %
PLATELET # BLD AUTO: 324 K/UL
RBC # BLD: 4.44 M/UL
RBC # FLD: 13.2 %
RUBV IGG FLD-ACNC: 1.1 INDEX
RUBV IGG SER-IMP: POSITIVE
SOURCE AMPLIFICATION: NORMAL
T PALLIDUM AB SER QL IA: NEGATIVE
TSH SERPL-ACNC: 1.15 UIU/ML
VZV AB TITR SER: POSITIVE
VZV IGG SER IF-ACNC: 1186 INDEX
WBC # FLD AUTO: 10.03 K/UL

## 2019-12-26 ENCOUNTER — ASOB RESULT (OUTPATIENT)
Age: 34
End: 2019-12-26

## 2019-12-26 ENCOUNTER — APPOINTMENT (OUTPATIENT)
Dept: OBGYN | Facility: CLINIC | Age: 34
End: 2019-12-26
Payer: COMMERCIAL

## 2019-12-26 PROCEDURE — 76817 TRANSVAGINAL US OBSTETRIC: CPT

## 2019-12-30 LAB
B19V IGG SER QL IA: 0.2 INDEX
B19V IGG+IGM SER-IMP: NEGATIVE
B19V IGG+IGM SER-IMP: NORMAL
B19V IGM FLD-ACNC: 0.1
B19V IGM SER-ACNC: NEGATIVE
HGB A MFR BLD: 97.4 %
HGB A2 MFR BLD: 2.6 %
HGB FRACT BLD-IMP: NORMAL

## 2020-01-08 ENCOUNTER — MEDICATION RENEWAL (OUTPATIENT)
Age: 35
End: 2020-01-08

## 2020-01-08 ENCOUNTER — APPOINTMENT (OUTPATIENT)
Dept: OBGYN | Facility: CLINIC | Age: 35
End: 2020-01-08
Payer: COMMERCIAL

## 2020-01-08 VITALS
SYSTOLIC BLOOD PRESSURE: 116 MMHG | BODY MASS INDEX: 34.39 KG/M2 | HEIGHT: 63.5 IN | WEIGHT: 196.5 LBS | DIASTOLIC BLOOD PRESSURE: 72 MMHG

## 2020-01-08 PROCEDURE — 0501F PRENATAL FLOW SHEET: CPT

## 2020-01-13 ENCOUNTER — CLINICAL ADVICE (OUTPATIENT)
Age: 35
End: 2020-01-13

## 2020-01-15 ENCOUNTER — ASOB RESULT (OUTPATIENT)
Age: 35
End: 2020-01-15

## 2020-01-15 ENCOUNTER — APPOINTMENT (OUTPATIENT)
Dept: OBGYN | Facility: CLINIC | Age: 35
End: 2020-01-15

## 2020-01-15 ENCOUNTER — APPOINTMENT (OUTPATIENT)
Dept: ANTEPARTUM | Facility: CLINIC | Age: 35
End: 2020-01-15
Payer: COMMERCIAL

## 2020-01-15 ENCOUNTER — APPOINTMENT (OUTPATIENT)
Dept: ALLERGY | Facility: CLINIC | Age: 35
End: 2020-01-15
Payer: COMMERCIAL

## 2020-01-15 ENCOUNTER — LABORATORY RESULT (OUTPATIENT)
Age: 35
End: 2020-01-15

## 2020-01-15 PROCEDURE — 95117 IMMUNOTHERAPY INJECTIONS: CPT

## 2020-01-15 PROCEDURE — 76801 OB US < 14 WKS SINGLE FETUS: CPT

## 2020-01-15 PROCEDURE — 76813 OB US NUCHAL MEAS 1 GEST: CPT

## 2020-01-15 PROCEDURE — 36416 COLLJ CAPILLARY BLOOD SPEC: CPT

## 2020-01-27 ENCOUNTER — APPOINTMENT (OUTPATIENT)
Dept: ALLERGY | Facility: CLINIC | Age: 35
End: 2020-01-27
Payer: COMMERCIAL

## 2020-01-27 PROCEDURE — 95117 IMMUNOTHERAPY INJECTIONS: CPT

## 2020-02-04 ENCOUNTER — APPOINTMENT (OUTPATIENT)
Dept: OBGYN | Facility: CLINIC | Age: 35
End: 2020-02-04
Payer: COMMERCIAL

## 2020-02-04 ENCOUNTER — NON-APPOINTMENT (OUTPATIENT)
Age: 35
End: 2020-02-04

## 2020-02-04 ENCOUNTER — APPOINTMENT (OUTPATIENT)
Dept: ALLERGY | Facility: CLINIC | Age: 35
End: 2020-02-04
Payer: COMMERCIAL

## 2020-02-04 VITALS
DIASTOLIC BLOOD PRESSURE: 76 MMHG | HEIGHT: 63.5 IN | SYSTOLIC BLOOD PRESSURE: 118 MMHG | WEIGHT: 201.3 LBS | BODY MASS INDEX: 35.23 KG/M2

## 2020-02-04 PROCEDURE — 95117 IMMUNOTHERAPY INJECTIONS: CPT

## 2020-02-04 PROCEDURE — 0502F SUBSEQUENT PRENATAL CARE: CPT

## 2020-02-07 LAB — GLUCOSE 1H P 50 G GLC PO SERPL-MCNC: 160 MG/DL

## 2020-02-24 ENCOUNTER — LABORATORY RESULT (OUTPATIENT)
Age: 35
End: 2020-02-24

## 2020-02-26 ENCOUNTER — RX RENEWAL (OUTPATIENT)
Age: 35
End: 2020-02-26

## 2020-03-02 ENCOUNTER — TRANSCRIPTION ENCOUNTER (OUTPATIENT)
Age: 35
End: 2020-03-02

## 2020-03-12 ENCOUNTER — APPOINTMENT (OUTPATIENT)
Dept: OBGYN | Facility: CLINIC | Age: 35
End: 2020-03-12
Payer: COMMERCIAL

## 2020-03-12 VITALS
BODY MASS INDEX: 35.43 KG/M2 | TEMPERATURE: 98.1 F | HEIGHT: 63.5 IN | DIASTOLIC BLOOD PRESSURE: 79 MMHG | SYSTOLIC BLOOD PRESSURE: 121 MMHG | WEIGHT: 202.5 LBS

## 2020-03-12 PROCEDURE — 0502F SUBSEQUENT PRENATAL CARE: CPT

## 2020-03-16 ENCOUNTER — APPOINTMENT (OUTPATIENT)
Dept: ANTEPARTUM | Facility: CLINIC | Age: 35
End: 2020-03-16
Payer: COMMERCIAL

## 2020-03-16 ENCOUNTER — ASOB RESULT (OUTPATIENT)
Age: 35
End: 2020-03-16

## 2020-03-16 PROCEDURE — 76811 OB US DETAILED SNGL FETUS: CPT

## 2020-04-15 ENCOUNTER — APPOINTMENT (OUTPATIENT)
Dept: OBGYN | Facility: CLINIC | Age: 35
End: 2020-04-15
Payer: COMMERCIAL

## 2020-04-15 VITALS
BODY MASS INDEX: 36.07 KG/M2 | HEIGHT: 63.5 IN | DIASTOLIC BLOOD PRESSURE: 74 MMHG | WEIGHT: 206.13 LBS | SYSTOLIC BLOOD PRESSURE: 113 MMHG

## 2020-04-15 PROCEDURE — 0502F SUBSEQUENT PRENATAL CARE: CPT

## 2020-04-25 ENCOUNTER — MESSAGE (OUTPATIENT)
Age: 35
End: 2020-04-25

## 2020-05-03 LAB
SARS-COV-2 IGG SERPL IA-ACNC: 0 INDEX
SARS-COV-2 IGG SERPL QL IA: NEGATIVE

## 2020-05-25 ENCOUNTER — NON-APPOINTMENT (OUTPATIENT)
Age: 35
End: 2020-05-25

## 2020-05-25 LAB
2ND TRIMESTER DATA: NORMAL
AFP PNL SERPL: NORMAL
AFP SERPL-ACNC: NORMAL
BASOPHILS # BLD AUTO: 0.05 K/UL
BASOPHILS NFR BLD AUTO: 0.5 %
CLINICAL BIOCHEMIST REVIEW: NORMAL
EOSINOPHIL # BLD AUTO: 0.29 K/UL
EOSINOPHIL NFR BLD AUTO: 2.9 %
HCT VFR BLD CALC: 37.5 %
HGB BLD-MCNC: 12.1 G/DL
IMM GRANULOCYTES NFR BLD AUTO: 0.3 %
LYMPHOCYTES # BLD AUTO: 3.32 K/UL
LYMPHOCYTES NFR BLD AUTO: 33 %
MAN DIFF?: NORMAL
MCHC RBC-ENTMCNC: 29.6 PG
MCHC RBC-ENTMCNC: 32.3 GM/DL
MCV RBC AUTO: 91.7 FL
MONOCYTES # BLD AUTO: 0.91 K/UL
MONOCYTES NFR BLD AUTO: 9 %
NEUTROPHILS # BLD AUTO: 5.47 K/UL
NEUTROPHILS NFR BLD AUTO: 54.3 %
NOTES NTD: NORMAL
PLATELET # BLD AUTO: 270 K/UL
RBC # BLD: 4.09 M/UL
RBC # FLD: 13.1 %
TSH SERPL-ACNC: 2.3 UIU/ML
WBC # FLD AUTO: 10.07 K/UL

## 2020-05-26 LAB
GLUCOSE 1H P 100 G GLC PO SERPL-MCNC: 210 MG/DL
GLUCOSE 2H P CHAL SERPL-MCNC: 161 MG/DL
GLUCOSE 3H P CHAL SERPL-MCNC: 169 MG/DL
GLUCOSE BS SERPL-MCNC: 87 MG/DL
T3FREE SERPL-MCNC: 2.78 PG/ML
T3FREE SERPL-MCNC: 3.08 PG/ML
T4 FREE SERPL-MCNC: 1 NG/DL
T4 FREE SERPL-MCNC: 1.2 NG/DL
TSH SERPL-ACNC: 1.75 UIU/ML

## 2020-05-27 ENCOUNTER — APPOINTMENT (OUTPATIENT)
Dept: OBGYN | Facility: CLINIC | Age: 35
End: 2020-05-27
Payer: COMMERCIAL

## 2020-05-27 VITALS
SYSTOLIC BLOOD PRESSURE: 110 MMHG | HEIGHT: 63 IN | DIASTOLIC BLOOD PRESSURE: 74 MMHG | WEIGHT: 213 LBS | BODY MASS INDEX: 37.74 KG/M2

## 2020-05-27 PROCEDURE — 90471 IMMUNIZATION ADMIN: CPT

## 2020-05-27 PROCEDURE — 90715 TDAP VACCINE 7 YRS/> IM: CPT

## 2020-06-08 ENCOUNTER — RX RENEWAL (OUTPATIENT)
Age: 35
End: 2020-06-08

## 2020-06-10 ENCOUNTER — APPOINTMENT (OUTPATIENT)
Dept: ANTEPARTUM | Facility: CLINIC | Age: 35
End: 2020-06-10
Payer: COMMERCIAL

## 2020-06-10 ENCOUNTER — ASOB RESULT (OUTPATIENT)
Age: 35
End: 2020-06-10

## 2020-06-10 PROCEDURE — 76819 FETAL BIOPHYS PROFIL W/O NST: CPT

## 2020-06-10 PROCEDURE — 76816 OB US FOLLOW-UP PER FETUS: CPT

## 2020-06-15 ENCOUNTER — APPOINTMENT (OUTPATIENT)
Dept: OBGYN | Facility: CLINIC | Age: 35
End: 2020-06-15

## 2020-06-17 ENCOUNTER — APPOINTMENT (OUTPATIENT)
Dept: MATERNAL FETAL MEDICINE | Facility: CLINIC | Age: 35
End: 2020-06-17
Payer: COMMERCIAL

## 2020-06-17 PROCEDURE — G0109 DIAB MANAGE TRN IND/GROUP: CPT | Mod: 95

## 2020-06-19 ENCOUNTER — APPOINTMENT (OUTPATIENT)
Dept: OBGYN | Facility: CLINIC | Age: 35
End: 2020-06-19
Payer: COMMERCIAL

## 2020-06-19 ENCOUNTER — NON-APPOINTMENT (OUTPATIENT)
Age: 35
End: 2020-06-19

## 2020-06-19 VITALS
BODY MASS INDEX: 37.83 KG/M2 | HEIGHT: 63 IN | WEIGHT: 213.5 LBS | SYSTOLIC BLOOD PRESSURE: 103 MMHG | DIASTOLIC BLOOD PRESSURE: 67 MMHG

## 2020-06-19 DIAGNOSIS — R12 OTHER SPECIFIED PREGNANCY RELATED CONDITIONS, SECOND TRIMESTER: ICD-10-CM

## 2020-06-19 DIAGNOSIS — O23.40 UNSPECIFIED INFECTION OF URINARY TRACT IN PREGNANCY, UNSPECIFIED TRIMESTER: ICD-10-CM

## 2020-06-19 DIAGNOSIS — O09.521 SUPERVISION OF ELDERLY MULTIGRAVIDA, FIRST TRIMESTER: ICD-10-CM

## 2020-06-19 DIAGNOSIS — O09.299 SUPERVISION OF PREGNANCY WITH OTHER POOR REPRODUCTIVE OR OBSTETRIC HISTORY, UNSPECIFIED TRIMESTER: ICD-10-CM

## 2020-06-19 DIAGNOSIS — Z87.59 PERSONAL HISTORY OF OTHER COMPLICATIONS OF PREGNANCY, CHILDBIRTH AND THE PUERPERIUM: ICD-10-CM

## 2020-06-19 DIAGNOSIS — Z32.01 ENCOUNTER FOR PREGNANCY TEST, RESULT POSITIVE: ICD-10-CM

## 2020-06-19 DIAGNOSIS — O36.80X1 PREGNANCY WITH INCONCLUSIVE FETAL VIABILITY, FETUS 1: ICD-10-CM

## 2020-06-19 DIAGNOSIS — O26.892 OTHER SPECIFIED PREGNANCY RELATED CONDITIONS, SECOND TRIMESTER: ICD-10-CM

## 2020-06-19 PROCEDURE — 0502F SUBSEQUENT PRENATAL CARE: CPT

## 2020-06-19 RX ORDER — FAMOTIDINE 20 MG
TABLET ORAL
Refills: 0 | Status: DISCONTINUED | COMMUNITY
End: 2020-06-19

## 2020-06-19 RX ORDER — AZELASTINE HYDROCHLORIDE 0.5 MG/ML
0.05 SOLUTION/ DROPS OPHTHALMIC
Qty: 3 | Refills: 2 | Status: DISCONTINUED | COMMUNITY
Start: 2019-07-31 | End: 2020-06-19

## 2020-06-19 RX ORDER — AZELASTINE HYDROCHLORIDE 137 UG/1
0.1 SPRAY, METERED NASAL TWICE DAILY
Qty: 3 | Refills: 3 | Status: DISCONTINUED | COMMUNITY
Start: 2019-07-31 | End: 2020-06-19

## 2020-06-29 ENCOUNTER — RX RENEWAL (OUTPATIENT)
Age: 35
End: 2020-06-29

## 2020-06-30 ENCOUNTER — APPOINTMENT (OUTPATIENT)
Dept: OBGYN | Facility: CLINIC | Age: 35
End: 2020-06-30

## 2020-06-30 VITALS
DIASTOLIC BLOOD PRESSURE: 72 MMHG | HEIGHT: 63 IN | BODY MASS INDEX: 37.74 KG/M2 | WEIGHT: 213 LBS | SYSTOLIC BLOOD PRESSURE: 108 MMHG

## 2020-07-01 ENCOUNTER — ASOB RESULT (OUTPATIENT)
Age: 35
End: 2020-07-01

## 2020-07-01 ENCOUNTER — APPOINTMENT (OUTPATIENT)
Dept: MATERNAL FETAL MEDICINE | Facility: CLINIC | Age: 35
End: 2020-07-01
Payer: COMMERCIAL

## 2020-07-01 PROCEDURE — G0108 DIAB MANAGE TRN  PER INDIV: CPT | Mod: 95

## 2020-07-06 ENCOUNTER — ASOB RESULT (OUTPATIENT)
Age: 35
End: 2020-07-06

## 2020-07-06 ENCOUNTER — APPOINTMENT (OUTPATIENT)
Dept: ANTEPARTUM | Facility: CLINIC | Age: 35
End: 2020-07-06
Payer: COMMERCIAL

## 2020-07-06 PROCEDURE — 99213 OFFICE O/P EST LOW 20 MIN: CPT | Mod: 95

## 2020-07-08 ENCOUNTER — ASOB RESULT (OUTPATIENT)
Age: 35
End: 2020-07-08

## 2020-07-08 ENCOUNTER — APPOINTMENT (OUTPATIENT)
Dept: MATERNAL FETAL MEDICINE | Facility: CLINIC | Age: 35
End: 2020-07-08
Payer: COMMERCIAL

## 2020-07-08 PROCEDURE — G0108 DIAB MANAGE TRN  PER INDIV: CPT | Mod: 95

## 2020-07-08 RX ORDER — METFORMIN ER 500 MG 500 MG/1
500 TABLET ORAL
Qty: 1 | Refills: 2 | Status: DISCONTINUED | COMMUNITY
Start: 2020-07-01 | End: 2020-07-08

## 2020-07-10 ENCOUNTER — ASOB RESULT (OUTPATIENT)
Age: 35
End: 2020-07-10

## 2020-07-10 ENCOUNTER — OUTPATIENT (OUTPATIENT)
Dept: OUTPATIENT SERVICES | Facility: HOSPITAL | Age: 35
LOS: 1 days | End: 2020-07-10

## 2020-07-10 ENCOUNTER — APPOINTMENT (OUTPATIENT)
Dept: ANTEPARTUM | Facility: HOSPITAL | Age: 35
End: 2020-07-10

## 2020-07-10 ENCOUNTER — APPOINTMENT (OUTPATIENT)
Dept: ANTEPARTUM | Facility: CLINIC | Age: 35
End: 2020-07-10
Payer: COMMERCIAL

## 2020-07-10 DIAGNOSIS — Z90.49 ACQUIRED ABSENCE OF OTHER SPECIFIED PARTS OF DIGESTIVE TRACT: Chronic | ICD-10-CM

## 2020-07-10 PROCEDURE — 76818 FETAL BIOPHYS PROFILE W/NST: CPT | Mod: 26

## 2020-07-10 PROCEDURE — 76816 OB US FOLLOW-UP PER FETUS: CPT

## 2020-07-15 ENCOUNTER — APPOINTMENT (OUTPATIENT)
Dept: MATERNAL FETAL MEDICINE | Facility: CLINIC | Age: 35
End: 2020-07-15
Payer: COMMERCIAL

## 2020-07-15 ENCOUNTER — ASOB RESULT (OUTPATIENT)
Age: 35
End: 2020-07-15

## 2020-07-15 PROCEDURE — G0108 DIAB MANAGE TRN  PER INDIV: CPT | Mod: 95

## 2020-07-16 ENCOUNTER — TRANSCRIPTION ENCOUNTER (OUTPATIENT)
Age: 35
End: 2020-07-16

## 2020-07-16 ENCOUNTER — LABORATORY RESULT (OUTPATIENT)
Age: 35
End: 2020-07-16

## 2020-07-16 ENCOUNTER — INPATIENT (INPATIENT)
Facility: HOSPITAL | Age: 35
LOS: 1 days | Discharge: HOME | End: 2020-07-18
Attending: OBSTETRICS & GYNECOLOGY | Admitting: OBSTETRICS & GYNECOLOGY
Payer: COMMERCIAL

## 2020-07-16 ENCOUNTER — APPOINTMENT (OUTPATIENT)
Dept: OBGYN | Facility: CLINIC | Age: 35
End: 2020-07-16
Payer: COMMERCIAL

## 2020-07-16 VITALS
WEIGHT: 210 LBS | HEIGHT: 63 IN | SYSTOLIC BLOOD PRESSURE: 115 MMHG | TEMPERATURE: 98.2 F | DIASTOLIC BLOOD PRESSURE: 82 MMHG | BODY MASS INDEX: 37.21 KG/M2

## 2020-07-16 VITALS — SYSTOLIC BLOOD PRESSURE: 160 MMHG | HEART RATE: 77 BPM | DIASTOLIC BLOOD PRESSURE: 95 MMHG

## 2020-07-16 DIAGNOSIS — O26.899 OTHER SPECIFIED PREGNANCY RELATED CONDITIONS, UNSPECIFIED TRIMESTER: ICD-10-CM

## 2020-07-16 DIAGNOSIS — Z90.49 ACQUIRED ABSENCE OF OTHER SPECIFIED PARTS OF DIGESTIVE TRACT: Chronic | ICD-10-CM

## 2020-07-16 DIAGNOSIS — Z3A.00 WEEKS OF GESTATION OF PREGNANCY NOT SPECIFIED: ICD-10-CM

## 2020-07-16 LAB
ALBUMIN SERPL ELPH-MCNC: 4.1 G/DL — SIGNIFICANT CHANGE UP (ref 3.3–5)
ALP SERPL-CCNC: 83 U/L — SIGNIFICANT CHANGE UP (ref 40–120)
ALT FLD-CCNC: 19 U/L — SIGNIFICANT CHANGE UP (ref 4–33)
ANION GAP SERPL CALC-SCNC: 20 MMO/L — HIGH (ref 7–14)
APTT BLD: 24 SEC — LOW (ref 27–36.3)
AST SERPL-CCNC: 17 U/L — SIGNIFICANT CHANGE UP (ref 4–32)
BASOPHILS # BLD AUTO: 0.05 K/UL — SIGNIFICANT CHANGE UP (ref 0–0.2)
BASOPHILS NFR BLD AUTO: 0.4 % — SIGNIFICANT CHANGE UP (ref 0–2)
BILIRUB SERPL-MCNC: 0.2 MG/DL — SIGNIFICANT CHANGE UP (ref 0.2–1.2)
BUN SERPL-MCNC: 10 MG/DL — SIGNIFICANT CHANGE UP (ref 7–23)
CALCIUM SERPL-MCNC: 9.4 MG/DL — SIGNIFICANT CHANGE UP (ref 8.4–10.5)
CHLORIDE SERPL-SCNC: 100 MMOL/L — SIGNIFICANT CHANGE UP (ref 98–107)
CO2 SERPL-SCNC: 17 MMOL/L — LOW (ref 22–31)
CREAT SERPL-MCNC: 0.61 MG/DL — SIGNIFICANT CHANGE UP (ref 0.5–1.3)
EOSINOPHIL # BLD AUTO: 0.15 K/UL — SIGNIFICANT CHANGE UP (ref 0–0.5)
EOSINOPHIL NFR BLD AUTO: 1.1 % — SIGNIFICANT CHANGE UP (ref 0–6)
FIBRINOGEN PPP-MCNC: 649 MG/DL — HIGH (ref 300–520)
GLUCOSE SERPL-MCNC: 94 MG/DL — SIGNIFICANT CHANGE UP (ref 70–99)
HCT VFR BLD CALC: 41.3 % — SIGNIFICANT CHANGE UP (ref 34.5–45)
HGB BLD-MCNC: 13.7 G/DL — SIGNIFICANT CHANGE UP (ref 11.5–15.5)
IMM GRANULOCYTES NFR BLD AUTO: 0.4 % — SIGNIFICANT CHANGE UP (ref 0–1.5)
INR BLD: 0.88 — SIGNIFICANT CHANGE UP (ref 0.88–1.17)
LDH SERPL L TO P-CCNC: 198 U/L — SIGNIFICANT CHANGE UP (ref 135–225)
LYMPHOCYTES # BLD AUTO: 26.6 % — SIGNIFICANT CHANGE UP (ref 13–44)
LYMPHOCYTES # BLD AUTO: 3.48 K/UL — HIGH (ref 1–3.3)
MCHC RBC-ENTMCNC: 29.9 PG — SIGNIFICANT CHANGE UP (ref 27–34)
MCHC RBC-ENTMCNC: 33.2 % — SIGNIFICANT CHANGE UP (ref 32–36)
MCV RBC AUTO: 90.2 FL — SIGNIFICANT CHANGE UP (ref 80–100)
MONOCYTES # BLD AUTO: 1.01 K/UL — HIGH (ref 0–0.9)
MONOCYTES NFR BLD AUTO: 7.7 % — SIGNIFICANT CHANGE UP (ref 2–14)
NEUTROPHILS # BLD AUTO: 8.36 K/UL — HIGH (ref 1.8–7.4)
NEUTROPHILS NFR BLD AUTO: 63.8 % — SIGNIFICANT CHANGE UP (ref 43–77)
NRBC # FLD: 0 K/UL — SIGNIFICANT CHANGE UP (ref 0–0)
PLATELET # BLD AUTO: 261 K/UL — SIGNIFICANT CHANGE UP (ref 150–400)
PMV BLD: 10.7 FL — SIGNIFICANT CHANGE UP (ref 7–13)
POTASSIUM SERPL-MCNC: 3.7 MMOL/L — SIGNIFICANT CHANGE UP (ref 3.5–5.3)
POTASSIUM SERPL-SCNC: 3.7 MMOL/L — SIGNIFICANT CHANGE UP (ref 3.5–5.3)
PROT SERPL-MCNC: 7.2 G/DL — SIGNIFICANT CHANGE UP (ref 6–8.3)
PROTHROM AB SERPL-ACNC: 10 SEC — SIGNIFICANT CHANGE UP (ref 9.8–13.1)
RBC # BLD: 4.58 M/UL — SIGNIFICANT CHANGE UP (ref 3.8–5.2)
RBC # FLD: 13.2 % — SIGNIFICANT CHANGE UP (ref 10.3–14.5)
SODIUM SERPL-SCNC: 137 MMOL/L — SIGNIFICANT CHANGE UP (ref 135–145)
URATE SERPL-MCNC: 4.8 MG/DL — SIGNIFICANT CHANGE UP (ref 2.5–7)
WBC # BLD: 13.1 K/UL — HIGH (ref 3.8–10.5)
WBC # FLD AUTO: 13.1 K/UL — HIGH (ref 3.8–10.5)

## 2020-07-16 PROCEDURE — 59400 OBSTETRICAL CARE: CPT | Mod: U7,UB,GC

## 2020-07-16 PROCEDURE — 0502F SUBSEQUENT PRENATAL CARE: CPT

## 2020-07-16 RX ORDER — ACETAMINOPHEN 500 MG
975 TABLET ORAL
Refills: 0 | Status: DISCONTINUED | OUTPATIENT
Start: 2020-07-16 | End: 2020-07-18

## 2020-07-16 RX ORDER — TETANUS TOXOID, REDUCED DIPHTHERIA TOXOID AND ACELLULAR PERTUSSIS VACCINE, ADSORBED 5; 2.5; 8; 8; 2.5 [IU]/.5ML; [IU]/.5ML; UG/.5ML; UG/.5ML; UG/.5ML
0.5 SUSPENSION INTRAMUSCULAR ONCE
Refills: 0 | Status: DISCONTINUED | OUTPATIENT
Start: 2020-07-16 | End: 2020-07-18

## 2020-07-16 RX ORDER — SODIUM CHLORIDE 9 MG/ML
1000 INJECTION INTRAMUSCULAR; INTRAVENOUS; SUBCUTANEOUS
Refills: 0 | Status: DISCONTINUED | OUTPATIENT
Start: 2020-07-16 | End: 2020-07-17

## 2020-07-16 RX ORDER — PRAMOXINE HYDROCHLORIDE 150 MG/15G
1 AEROSOL, FOAM RECTAL EVERY 4 HOURS
Refills: 0 | Status: DISCONTINUED | OUTPATIENT
Start: 2020-07-16 | End: 2020-07-18

## 2020-07-16 RX ORDER — OXYCODONE HYDROCHLORIDE 5 MG/1
5 TABLET ORAL ONCE
Refills: 0 | Status: DISCONTINUED | OUTPATIENT
Start: 2020-07-16 | End: 2020-07-18

## 2020-07-16 RX ORDER — AER TRAVELER 0.5 G/1
1 SOLUTION RECTAL; TOPICAL EVERY 4 HOURS
Refills: 0 | Status: DISCONTINUED | OUTPATIENT
Start: 2020-07-16 | End: 2020-07-18

## 2020-07-16 RX ORDER — METFORMIN HYDROCHLORIDE 850 MG/1
1 TABLET ORAL
Qty: 0 | Refills: 0 | DISCHARGE

## 2020-07-16 RX ORDER — OXYCODONE HYDROCHLORIDE 5 MG/1
5 TABLET ORAL
Refills: 0 | Status: DISCONTINUED | OUTPATIENT
Start: 2020-07-16 | End: 2020-07-18

## 2020-07-16 RX ORDER — OXYTOCIN 10 UNIT/ML
333.33 VIAL (ML) INJECTION
Qty: 20 | Refills: 0 | Status: DISCONTINUED | OUTPATIENT
Start: 2020-07-16 | End: 2020-07-17

## 2020-07-16 RX ORDER — OXYTOCIN 10 UNIT/ML
20 VIAL (ML) INJECTION ONCE
Refills: 0 | Status: COMPLETED | OUTPATIENT
Start: 2020-07-16 | End: 2020-07-16

## 2020-07-16 RX ORDER — LEVOTHYROXINE SODIUM 125 MCG
225 TABLET ORAL
Qty: 0 | Refills: 0 | DISCHARGE

## 2020-07-16 RX ORDER — KETOROLAC TROMETHAMINE 30 MG/ML
30 SYRINGE (ML) INJECTION ONCE
Refills: 0 | Status: DISCONTINUED | OUTPATIENT
Start: 2020-07-16 | End: 2020-07-17

## 2020-07-16 RX ORDER — DIPHENHYDRAMINE HCL 50 MG
25 CAPSULE ORAL EVERY 6 HOURS
Refills: 0 | Status: DISCONTINUED | OUTPATIENT
Start: 2020-07-16 | End: 2020-07-18

## 2020-07-16 RX ORDER — SODIUM CHLORIDE 9 MG/ML
1000 INJECTION, SOLUTION INTRAVENOUS
Refills: 0 | Status: DISCONTINUED | OUTPATIENT
Start: 2020-07-16 | End: 2020-07-17

## 2020-07-16 RX ORDER — DIBUCAINE 1 %
1 OINTMENT (GRAM) RECTAL EVERY 6 HOURS
Refills: 0 | Status: DISCONTINUED | OUTPATIENT
Start: 2020-07-16 | End: 2020-07-18

## 2020-07-16 RX ORDER — BENZOCAINE 10 %
1 GEL (GRAM) MUCOUS MEMBRANE EVERY 6 HOURS
Refills: 0 | Status: DISCONTINUED | OUTPATIENT
Start: 2020-07-16 | End: 2020-07-18

## 2020-07-16 RX ORDER — INSULIN LISPRO 100/ML
6 VIAL (ML) SUBCUTANEOUS
Qty: 0 | Refills: 0 | DISCHARGE

## 2020-07-16 RX ORDER — IBUPROFEN 200 MG
1 TABLET ORAL
Qty: 0 | Refills: 0 | DISCHARGE
Start: 2020-07-16

## 2020-07-16 RX ORDER — HYDROCORTISONE 1 %
1 OINTMENT (GRAM) TOPICAL EVERY 6 HOURS
Refills: 0 | Status: DISCONTINUED | OUTPATIENT
Start: 2020-07-16 | End: 2020-07-18

## 2020-07-16 RX ORDER — MAGNESIUM HYDROXIDE 400 MG/1
30 TABLET, CHEWABLE ORAL
Refills: 0 | Status: DISCONTINUED | OUTPATIENT
Start: 2020-07-16 | End: 2020-07-18

## 2020-07-16 RX ORDER — IBUPROFEN 200 MG
600 TABLET ORAL EVERY 6 HOURS
Refills: 0 | Status: COMPLETED | OUTPATIENT
Start: 2020-07-16 | End: 2021-06-14

## 2020-07-16 RX ORDER — ACETAMINOPHEN 500 MG
3 TABLET ORAL
Qty: 0 | Refills: 0 | DISCHARGE
Start: 2020-07-16

## 2020-07-16 RX ORDER — SIMETHICONE 80 MG/1
80 TABLET, CHEWABLE ORAL EVERY 4 HOURS
Refills: 0 | Status: DISCONTINUED | OUTPATIENT
Start: 2020-07-16 | End: 2020-07-18

## 2020-07-16 RX ORDER — SODIUM CHLORIDE 9 MG/ML
3 INJECTION INTRAMUSCULAR; INTRAVENOUS; SUBCUTANEOUS EVERY 8 HOURS
Refills: 0 | Status: DISCONTINUED | OUTPATIENT
Start: 2020-07-16 | End: 2020-07-18

## 2020-07-16 RX ORDER — LANOLIN
1 OINTMENT (GRAM) TOPICAL EVERY 6 HOURS
Refills: 0 | Status: DISCONTINUED | OUTPATIENT
Start: 2020-07-16 | End: 2020-07-18

## 2020-07-16 RX ADMIN — Medication 0.2 MILLIGRAM(S): at 22:09

## 2020-07-16 RX ADMIN — Medication 20 UNIT(S): at 22:10

## 2020-07-16 NOTE — DISCHARGE NOTE OB - CARE PROVIDER_API CALL
Dacia Craig  OBSTETRICS AND GYNECOLOGY  [id number] [address] [place] [address]  Phone: [phone]  Fax: [phone]  Follow Up Time:

## 2020-07-16 NOTE — OB PROVIDER TRIAGE NOTE - FAMILY HISTORY
Mother  Still living? Unknown  Family history of diabetes mellitus, Age at diagnosis: Age Unknown     Father  Still living? Unknown  Family history of hypertension, Age at diagnosis: Age Unknown

## 2020-07-16 NOTE — DISCHARGE NOTE OB - HOSPITAL COURSE
35 y.o. P  admitted on 20 at 37.2 weeks in active labor. PNC at [facility]- Dr. Craig, complicated by GDMA2 and Hypothyroidism. pt was 8cm upon arrival, progressed quickly, and delivered via  on 20 a live female, , apgars 8/9. EBL- 300cc. pt sustained a second degree laceration which was repaired. pt did well and was discharged home.

## 2020-07-16 NOTE — DISCHARGE NOTE OB - MEDICATION SUMMARY - MEDICATIONS TO STOP TAKING
I will STOP taking the medications listed below when I get home from the hospital:    metFORMIN 1000 mg oral tablet  -- 1 tab(s) by mouth 2 times a day    HumaLOG 100 units/mL subcutaneous solution  -- 6 unit(s) subcutaneous 3 times a day (before meals), As Needed

## 2020-07-16 NOTE — OB PROVIDER TRIAGE NOTE - NS_OBGYNHISTORY_OBGYN_ALL_OB_FT
2018-Uncomplicated  @ 39 wks. Male, 6.14#  2012-Uncomplicated  @ 38 wks. 7.4#  Pt denies any h/o: Abn. PAP, ovarian cysts, uterine fibroids, breast problems, STDs/STIs

## 2020-07-16 NOTE — OB PROVIDER TRIAGE NOTE - PMH
Adult Hypothyroidism    Biliary Colic  8th episode  Ectopic pregnancy    Insulin controlled gestational diabetes mellitus (GDM) in third trimester    Vaginal delivery  2012, 2018

## 2020-07-16 NOTE — OB PROVIDER DELIVERY SUMMARY - NSPROVIDERDELIVERYNOTE_OBGYN_ALL_OB_FT
live female, apgars 8/9. placenta delivered spontaneously, intact. midline second degree laceration repaired. pt given I.M. Pitocin 20u  and Methergine 0.2mg I.M. x 1 , as pt had no I.V. access at time of delivery. pt came in  in active labor at 8 cm and progressed quickly to delivery after transfer over to R 3 from D&T.  EBL- 300cc.   Jesus POWELL

## 2020-07-16 NOTE — OB PROVIDER H&P - ASSESSMENT
36 y/o [place] female, para 2002 @ 37+2 wks gestation, single IUP.  Labor @ term  GBS Unknown  GDMA2  Cephalic presentation via bedside sonogram  Plan of care d/w Dr Benavides  Report of to Dr Smith, PGY-4

## 2020-07-16 NOTE — OB PROVIDER H&P - NSHPPHYSICALEXAM_GEN_ALL_CORE
34 y/o [place] female, para 2002 @ 37+2 wks gestation, single IUP.  Labor @ term  Gen: NAD; A+O x 2  Cardiac: S1/S2, R/R/R  Pulm: CTAB  Abdomen: Gravid, soft, non-tender  VS-BP: 145/81, P74 , RR 18, T , Pain 10/10-Pt requesting epidural  NST: In progress  [place]: In progress  SVE: 8/100/-1, intact/bulging membranes  GBS UNknown  Clinical EFW: 3400g  TAS: Cephalic

## 2020-07-16 NOTE — OB PROVIDER TRIAGE NOTE - HISTORY OF PRESENT ILLNESS
Patient of Dr Craig  36 y/o  female, para 2002 @ 37+2 wks gestation, single IUP.  Presents to triage with c/o urge to push.  Pt endorses + fetal movement, denies any leakage of fluid or vaginal bleeding. GBS Unknown    A/P Complications: GDMA2   Blood Transfusion: Patient will accept blood    Covid Assessment: Pt denies any: fever, chills, cough, SOB or any recent known exposure to Covid-19.    Allergies: NKDA  Meds: PNV, Synthroid 225mcg; Metformin 1000mg BID; Humalog 6 units PRN before meals   PMH: Hypothyroidism  PSH: Cholecystectomy ('11)  OBgynHx    2018-Uncomplicated  @ 39 wks. Male, 6.14#  2012-Uncomplicated  @ 38 wks. 7.4#  Pt denies any h/o: Abn. PAP, ovarian cysts, uterine fibroids, breast problems, STDs/STIs  PSY: Denies  EtOH/ Smoke/ Recreational substance use: denies  FH: Diabetes (Mother); Hypertension (Father)  H/W/BMI: 63.5"/210#/36.6

## 2020-07-16 NOTE — OB PROVIDER TRIAGE NOTE - NSHPLABSRESULTS_GEN_ALL_CORE
Vital Signs Last 24 Hrs  T(C): --  T(F): --  HR: 78 (16 Jul 2020 21:48) (74 - 78)  BP: 134/79 (16 Jul 2020 21:48) (134/79 - 160/95)  BP(mean): --  RR: --  SpO2: --

## 2020-07-16 NOTE — DISCHARGE NOTE OB - MATERIALS PROVIDED
Breastfeeding Log/Breastfeeding Guide and Packet/[place]  Immunization Record/[facility] [place] Screening Program/Bottle Feeding Log/Vaccinations/Shaken Baby Prevention Handout/Discharge Medication Information for Patients and Families Pocket Guide/Back To Sleep Handout/[facility] Hearing Screen Program/Breastfeeding Mother’s Support Group Information/Guide to Postpartum Care

## 2020-07-16 NOTE — OB PROVIDER TRIAGE NOTE - NSHPPHYSICALEXAM_GEN_ALL_CORE
34 y/o [place] female, para 2002 @ 37+2 wks gestation, single IUP.  Labor @ term  Gen: NAD; A+O x 2  Cardiac: S1/S2, R/R/R  Pulm: CTAB  Abdomen: Gravid, soft, non-tender  VS-BP: 145/81, P74 , RR 18, T , Pain 10/10-Pt requesting epidural  NST: In progress  [place]: In progress  SVE: 8/100/-1, intact/bulging membranes  GBS UNknown  Clinical EFW: 3400g  TAS: Cephalic

## 2020-07-16 NOTE — DISCHARGE NOTE OB - CARE PLAN
Principal Discharge DX:	Term pregnancy delivered  Goal:	full recovery  Assessment and plan of treatment:	normal activity, regular  diet, follow up in office in 6 weeks.  Secondary Diagnosis:	Gestational diabetes mellitus (GDM) affecting third pregnancy  Goal:	euglycemia  Assessment and plan of treatment:	as above

## 2020-07-16 NOTE — OB RN DELIVERY SUMMARY - NS_SEPSISRSKCALC_OBGYN_ALL_OB_FT
EOS calculated successfully. EOS Risk Factor: 0.5/1000 live births ([facility] national incidence); GA=37w2d; Temp=98.4; ROM=0.083; GBS='Unknown'; Antibiotics='No antibiotics or any antibiotics < 2 hrs prior to birth'

## 2020-07-16 NOTE — OB RN DELIVERY SUMMARY - NS_AFTERADMROM_OBGYN_ALL_OB_DT
Problem: Non-Pressure Injury Wound  Goal: # No deterioration in wound  Outcome: Outcome Met, Continue evaluating goal progress toward completion  Wounds continue to heal.  Replaced dome layer with visco layer due to itching.        16-Jul-2020 21:55

## 2020-07-16 NOTE — OB PROVIDER H&P - PROBLEM SELECTOR PLAN 1
Admit to L&D  -Routine labs/PEC Labs/ Covid PCR  -Epidural PRN  -Gestational diabetes management protocol  -Anticipate

## 2020-07-17 ENCOUNTER — APPOINTMENT (OUTPATIENT)
Dept: ANTEPARTUM | Facility: CLINIC | Age: 35
End: 2020-07-17

## 2020-07-17 LAB
BLD GP AB SCN SERPL QL: NEGATIVE — SIGNIFICANT CHANGE UP
GLUCOSE BLDC GLUCOMTR-MCNC: 101 MG/DL — HIGH (ref 70–99)
GLUCOSE BLDC GLUCOMTR-MCNC: 113 MG/DL — HIGH (ref 70–99)
GLUCOSE BLDC GLUCOMTR-MCNC: 122 MG/DL — HIGH (ref 70–99)
GLUCOSE BLDC GLUCOMTR-MCNC: 130 MG/DL — HIGH (ref 70–99)
GLUCOSE BLDC GLUCOMTR-MCNC: 56 MG/DL — LOW (ref 70–99)
RH IG SCN BLD-IMP: POSITIVE — SIGNIFICANT CHANGE UP
SARS-COV-2 RNA SPEC QL NAA+PROBE: SIGNIFICANT CHANGE UP
T PALLIDUM AB TITR SER: NEGATIVE — SIGNIFICANT CHANGE UP

## 2020-07-17 RX ORDER — LEVOTHYROXINE SODIUM 125 MCG
225 TABLET ORAL DAILY
Refills: 0 | Status: DISCONTINUED | OUTPATIENT
Start: 2020-07-17 | End: 2020-07-18

## 2020-07-17 RX ORDER — IBUPROFEN 200 MG
600 TABLET ORAL EVERY 6 HOURS
Refills: 0 | Status: DISCONTINUED | OUTPATIENT
Start: 2020-07-17 | End: 2020-07-18

## 2020-07-17 RX ORDER — LEVOTHYROXINE SODIUM 125 MCG
125 TABLET ORAL DAILY
Refills: 0 | Status: DISCONTINUED | OUTPATIENT
Start: 2020-07-17 | End: 2020-07-17

## 2020-07-17 RX ORDER — LEVOTHYROXINE SODIUM 125 MCG
100 TABLET ORAL DAILY
Refills: 0 | Status: DISCONTINUED | OUTPATIENT
Start: 2020-07-17 | End: 2020-07-17

## 2020-07-17 RX ORDER — FAMOTIDINE 10 MG/ML
20 INJECTION INTRAVENOUS DAILY
Refills: 0 | Status: DISCONTINUED | OUTPATIENT
Start: 2020-07-17 | End: 2020-07-18

## 2020-07-17 RX ADMIN — Medication 225 MICROGRAM(S): at 06:04

## 2020-07-17 RX ADMIN — Medication 600 MILLIGRAM(S): at 02:20

## 2020-07-17 RX ADMIN — Medication 1 TABLET(S): at 11:02

## 2020-07-17 RX ADMIN — Medication 975 MILLIGRAM(S): at 05:49

## 2020-07-17 RX ADMIN — Medication 600 MILLIGRAM(S): at 11:02

## 2020-07-17 RX ADMIN — Medication 975 MILLIGRAM(S): at 06:53

## 2020-07-17 RX ADMIN — Medication 975 MILLIGRAM(S): at 21:42

## 2020-07-17 RX ADMIN — Medication 600 MILLIGRAM(S): at 16:22

## 2020-07-17 RX ADMIN — Medication 975 MILLIGRAM(S): at 14:18

## 2020-07-17 RX ADMIN — FAMOTIDINE 20 MILLIGRAM(S): 10 INJECTION INTRAVENOUS at 04:05

## 2020-07-17 RX ADMIN — Medication 975 MILLIGRAM(S): at 00:25

## 2020-07-17 RX ADMIN — Medication 975 MILLIGRAM(S): at 15:00

## 2020-07-17 RX ADMIN — Medication 975 MILLIGRAM(S): at 22:24

## 2020-07-17 NOTE — LACTATION INITIAL EVALUATION - INTERVENTION OUTCOME
verbalizes understanding/nbn  has  short bursts  at breast with sucking  and swallowing  noted   offered assistance  as needed  .  if not  progressing  encouraged  hand  expression  with  pumping .  rn aware  of  plan .

## 2020-07-17 NOTE — LACTATION INITIAL EVALUATION - LACTATION INTERVENTIONS
assisted with deep latch and positioning  discussed  signs  of  effective  feeding and  swallowing. instructed  to offer both  breast at a feeding ,feed on cue and safe  skin to skin.  reviewed  late  with  mother . reviewed strategies to wake  nbn  . encouraged more frequent  attempts. reviewed w ith  mother size  flange  if  starting  with  double  electric  pump .  reviewed  care of  nipples ./initiate skin to skin

## 2020-07-17 NOTE — PROGRESS NOTE ADULT - SUBJECTIVE AND OBJECTIVE BOX
OB Progress Note:  PPD#1    S: 36yo   PPD#1 s/p , pregnancy complicated by GDMA2 on insulin.   Patient feels well. Pain is well controlled. She is tolerating a regular diet and passing flatus. She is voiding spontaneously, and ambulating without difficulty. Denies CP/SOB. Denies lightheadedness/dizziness. Denies N/V.    PAST MEDICAL & SURGICAL HISTORY:  Vaginal delivery: , 2018  Insulin controlled gestational diabetes mellitus (GDM) in third trimester  Ectopic pregnancy  Biliary Colic: 8th episode  Adult Hypothyroidism  S/P cholecystectomy:       O:  Vitals:  Vital Signs Last 24 Hrs  T(C): 37.1 (2020 01:15), Max: 37.1 (2020 01:15)  T(F): 98.7 (2020 01:15), Max: 98.7 (2020 01:15)  HR: 71 (2020 01:15) (68 - 136)  BP: 120/61 (2020 01:15) (115/60 - 160/95)  BP(mean): --  RR: 17 (2020 01:15) (17 - 18)  SpO2: 99% (2020 01:15) (99% - 100%)    MEDICATIONS  (STANDING):  acetaminophen   Tablet .. 975 milliGRAM(s) Oral <User Schedule>  diphtheria/tetanus/pertussis (acellular) Vaccine (ADAcel) 0.5 milliLiter(s) IntraMuscular once  famotidine    Tablet 20 milliGRAM(s) Oral daily  ibuprofen  Tablet. 600 milliGRAM(s) Oral every 6 hours  ketorolac   Injectable 30 milliGRAM(s) IV Push once  levothyroxine 225 MICROGram(s) Oral daily  prenatal multivitamin 1 Tablet(s) Oral daily  sodium chloride 0.9% lock flush 3 milliLiter(s) IV Push every 8 hours      Labs:  Blood type: A Positive  Rubella IgG: RPR:                           13.7   13.10<H> >-----------< 261    (  @ 22:30 )             41.3    20 @ 22:30      137  |  100  |  10  ----------------------------<  94  3.7   |  17<L>  |  0.61        Ca    9.4      2020 22:30    TPro  7.2  /  Alb  4.1  /  TBili  0.2  /  DBili  x   /  AST  17  /  ALT  19  /  AlkPhos  83  20 @ 22:30          Physical Exam:  General: NAD  Abdomen: soft, non-tender, non-distended  Fundus: firm, nontender  Vaginal: Lochia moderate, wnl; no active bleeding  Extremities: No erythema, no edema, no calf tenderness    A/P: 36yo  PPD#1 s/p .  Patient is stable and doing well post-partum.   - blood glucose monitoring AC/HS for first 24 hours postpartum for GMDA2 on insulin during pregnancy  - f/u OGTT in 6 weeks for GMDA2 on insulin during pregnancy  - Pain well controlled, continue current pain regimen  - Increase ambulation, SCDs when not ambulating  - Continue regular diet  - discharge planning

## 2020-07-17 NOTE — PROGRESS NOTE ADULT - SUBJECTIVE AND OBJECTIVE BOX
Patient with no complaints  Tolerating regular diet  Ambulating  Breastfeeding   Request discharge home tommorrow    T(C): 36.6 (07-17-20 @ 10:06), Max: 37.1 (07-17-20 @ 01:15)  HR: 70 (07-17-20 @ 10:06) (68 - 136)  BP: 114/66 (07-17-20 @ 10:06) (112/67 - 160/95)  RR: 16 (07-17-20 @ 10:06) (16 - 18)  SpO2: 99% (07-17-20 @ 10:06) (97% - 100%)    Gen: NAD, A&O x 3  Breast: b/l symmetrical and non engorged  Abd: Fundus firm, non tender, non distended  VE: Mild lochia rubra  Ext: B/l LE non tender, no calf tenderness       acetaminophen   Tablet .. 975 milliGRAM(s) Oral <User Schedule>  benzocaine 20%/menthol 0.5% Spray 1 Spray(s) Topical every 6 hours PRN  dibucaine 1% Ointment 1 Application(s) Topical every 6 hours PRN  diphenhydrAMINE 25 milliGRAM(s) Oral every 6 hours PRN  diphtheria/tetanus/pertussis (acellular) Vaccine (ADAcel) 0.5 milliLiter(s) IntraMuscular once  famotidine    Tablet 20 milliGRAM(s) Oral daily  hydrocortisone 1% Cream 1 Application(s) Topical every 6 hours PRN  ibuprofen  Tablet. 600 milliGRAM(s) Oral every 6 hours  ketorolac   Injectable 30 milliGRAM(s) IV Push once  lanolin Ointment 1 Application(s) Topical every 6 hours PRN  levothyroxine 225 MICROGram(s) Oral daily  magnesium hydroxide Suspension 30 milliLiter(s) Oral two times a day PRN  oxyCODONE    IR 5 milliGRAM(s) Oral every 3 hours PRN  oxyCODONE    IR 5 milliGRAM(s) Oral once PRN  pramoxine 1%/zinc 5% Cream 1 Application(s) Topical every 4 hours PRN  prenatal multivitamin 1 Tablet(s) Oral daily  simethicone 80 milliGRAM(s) Chew every 4 hours PRN  sodium chloride 0.9% lock flush 3 milliLiter(s) IV Push every 8 hours  witch hazel Pads 1 Application(s) Topical every 4 hours PRN      Assessment and Plan  35y s/p NVD, PPD 1  Pregnancy complicated by GDMA2 - needs 6 week GTT postpartum and hypothroidism    -Continue levothyroxine  -Continue regular diet  -Ambulation  -OOB as tolerated  -Anticipate discharge home tomorrow    ERICH Shah MD [facility]

## 2020-07-18 VITALS
TEMPERATURE: 98 F | OXYGEN SATURATION: 99 % | DIASTOLIC BLOOD PRESSURE: 57 MMHG | SYSTOLIC BLOOD PRESSURE: 110 MMHG | HEART RATE: 72 BPM | RESPIRATION RATE: 17 BRPM

## 2020-07-18 RX ADMIN — Medication 1 APPLICATION(S): at 00:41

## 2020-07-18 RX ADMIN — Medication 225 MICROGRAM(S): at 06:11

## 2020-07-18 RX ADMIN — Medication 1 TABLET(S): at 12:39

## 2020-07-18 RX ADMIN — Medication 600 MILLIGRAM(S): at 12:39

## 2020-07-18 RX ADMIN — Medication 600 MILLIGRAM(S): at 06:12

## 2020-07-18 RX ADMIN — FAMOTIDINE 20 MILLIGRAM(S): 10 INJECTION INTRAVENOUS at 04:56

## 2020-07-18 RX ADMIN — Medication 600 MILLIGRAM(S): at 00:41

## 2020-07-22 ENCOUNTER — APPOINTMENT (OUTPATIENT)
Dept: OBGYN | Facility: CLINIC | Age: 35
End: 2020-07-22

## 2020-07-23 ENCOUNTER — RX RENEWAL (OUTPATIENT)
Age: 35
End: 2020-07-23

## 2020-07-24 ENCOUNTER — APPOINTMENT (OUTPATIENT)
Dept: ANTEPARTUM | Facility: CLINIC | Age: 35
End: 2020-07-24

## 2020-07-24 LAB
ALBUMIN SERPL ELPH-MCNC: 3.7 G/DL
ALP BLD-CCNC: 74 U/L
ALT SERPL-CCNC: 16 U/L
ANION GAP SERPL CALC-SCNC: 15 MMOL/L
AST SERPL-CCNC: 16 U/L
BILIRUB SERPL-MCNC: 0.2 MG/DL
BUN SERPL-MCNC: 12 MG/DL
CALCIUM SERPL-MCNC: 9.9 MG/DL
CHLORIDE SERPL-SCNC: 102 MMOL/L
CO2 SERPL-SCNC: 22 MMOL/L
CREAT SERPL-MCNC: 0.71 MG/DL
HIV1+2 AB SPEC QL IA.RAPID: NONREACTIVE
POTASSIUM SERPL-SCNC: 4.4 MMOL/L
PROT SERPL-MCNC: 6.8 G/DL
SODIUM SERPL-SCNC: 138 MMOL/L

## 2020-07-30 ENCOUNTER — APPOINTMENT (OUTPATIENT)
Dept: OBGYN | Facility: CLINIC | Age: 35
End: 2020-07-30

## 2020-08-12 DIAGNOSIS — O09.523 SUPERVISION OF ELDERLY MULTIGRAVIDA, THIRD TRIMESTER: ICD-10-CM

## 2020-08-12 DIAGNOSIS — O26.23 PREGNANCY CARE FOR PATIENT WITH RECURRENT PREGNANCY LOSS, THIRD TRIMESTER: ICD-10-CM

## 2020-08-12 DIAGNOSIS — O24.415 GESTATIONAL DIABETES MELLITUS IN PREGNANCY, CONTROLLED BY ORAL HYPOGLYCEMIC DRUGS: ICD-10-CM

## 2020-08-12 DIAGNOSIS — O09.293 SUPERVISION OF PREGNANCY WITH OTHER POOR REPRODUCTIVE OR OBSTETRIC HISTORY, THIRD TRIMESTER: ICD-10-CM

## 2020-09-01 ENCOUNTER — APPOINTMENT (OUTPATIENT)
Dept: OBGYN | Facility: CLINIC | Age: 35
End: 2020-09-01
Payer: COMMERCIAL

## 2020-09-01 VITALS
DIASTOLIC BLOOD PRESSURE: 79 MMHG | SYSTOLIC BLOOD PRESSURE: 120 MMHG | BODY MASS INDEX: 35.61 KG/M2 | HEIGHT: 63 IN | WEIGHT: 201 LBS | TEMPERATURE: 97.9 F | HEART RATE: 73 BPM

## 2020-09-01 PROCEDURE — 0503F POSTPARTUM CARE VISIT: CPT

## 2020-09-06 NOTE — HISTORY OF PRESENT ILLNESS
[Postpartum Follow Up] : postpartum follow up [Complications:___] : complications include: [unfilled] [Gestational Diabetes] : gestational diabetes [Delivery Date: ___] : on [unfilled] [Wt. ___] : weighing [unfilled] [Vaginal Delivery] : vaginal delivery [Diabetes in Pregnancy] : diabetes in pregnancy [Delivery Date Was ___] : delivery date was [unfilled] [Pertussis Vaccine] : Pertussis vaccine administered [Girl] : baby is a girl [Infant's Name ___] : [unfilled] [___ Lbs] : [unfilled] lbs [___ Oz] : [unfilled] oz [Living at Home] : is currently living at home [Bottle Feeding] : bottle feeding [BF with Difficulty] : nursing with difficulty [Back to Normal] : is back to normal in size [Normal] : the vagina was normal [Doing Well] : is doing well [No Sign of Infection] : is showing no signs of infection [Excellent Pain Control] : has excellent pain control [None] : None [Healed] : healed [Rhogam] : Rhogam was not administered [BTL] : no tubal ligation [Rubella Vaccine] : Rubella vaccine was not administered [Resumed Menses] : has not resumed her menses [Resumed [place]] : has not resumed intercourse [Breastfeeding] : not currently nursing [Intended Contraception] : Intended Contraception: [S/Sx PP Depression] : no signs/symptoms of postpartum depression [Condoms] : condoms [Breast Pain] : no breast pain [Soft] : soft [Examination Of The Breasts] : breasts are normal [Distended] : not distended [Tender] : non tender

## 2020-09-22 NOTE — ED ADULT NURSE NOTE - OBJECTIVE STATEMENT
Please tell Roscoe anthony is back up and he can come for labs anytime.  33 YO  female with PMH hypothyroidism on levothyroid, ectopic pregnancy, and recurrent spontaneous abortions, via walk in presenting with complaints of lower back pain, described as 8/10, described as aching and cramping, radiates to knees, starting today at 1300. Pt also reports nasal pressure, and headache starting today at 1300. Pt reporting nausea and vomiting for last two weeks, worsening today where she was unable to keep any food down. Pt denies chest pain, shortness of breath, visual disturbances, numbness/tingling, fever,   diaphoresis, constipation, diarrhea, or urinary symptoms.    Pt Axox4, gross neuro intact, PERRL 4mm. Lungs clear throughout bilateral. S1S2 heard. Abdomen soft, non-tender, non-distended. Skin warm, dry, and intact. Safety and comfort measures maintained. 33 YO  8 week pregnant female with PMH hypothyroidism on levothyroid, ectopic pregnancy, and recurrent spontaneous abortions, via walk in presenting with complaints of lower back pain, rated as 8/10, described as aching and cramping, radiates to knees, starting today 2018 at 1300. Pt also reports accompanying nasal pressure, and headache starting today 2018 at 1300. Pt reporting nausea and vomiting for last two weeks, worsening today where she was unable to keep any food down. Pt denies chest pain, shortness of breath, visual disturbances, numbness/tingling, fever,   diaphoresis, constipation, diarrhea, or urinary symptoms.    Pt Axox4, gross neuro intact, PERRL 4mm. Lungs clear throughout bilateral. S1S2 heard. Abdomen soft, non-tender, non-distended. Skin warm, dry, and intact. Safety and comfort measures maintained.

## 2020-09-24 ENCOUNTER — LABORATORY RESULT (OUTPATIENT)
Age: 35
End: 2020-09-24

## 2020-09-24 ENCOUNTER — APPOINTMENT (OUTPATIENT)
Dept: INTERNAL MEDICINE | Facility: CLINIC | Age: 35
End: 2020-09-24
Payer: COMMERCIAL

## 2020-09-24 VITALS
TEMPERATURE: 98.5 F | HEART RATE: 78 BPM | DIASTOLIC BLOOD PRESSURE: 70 MMHG | BODY MASS INDEX: 35.78 KG/M2 | SYSTOLIC BLOOD PRESSURE: 112 MMHG | WEIGHT: 202 LBS | OXYGEN SATURATION: 97 %

## 2020-09-24 PROCEDURE — 99395 PREV VISIT EST AGE 18-39: CPT | Mod: 25

## 2020-09-24 PROCEDURE — G0008: CPT

## 2020-09-24 PROCEDURE — 90686 IIV4 VACC NO PRSV 0.5 ML IM: CPT

## 2020-09-24 RX ORDER — LANCETS 26 GAUGE
EACH MISCELLANEOUS
Qty: 100 | Refills: 0 | Status: DISCONTINUED | COMMUNITY
Start: 2018-08-20 | End: 2020-09-24

## 2020-09-24 RX ORDER — BLOOD-GLUCOSE METER
EACH MISCELLANEOUS
Qty: 1 | Refills: 0 | Status: DISCONTINUED | COMMUNITY
Start: 2020-05-26 | End: 2020-09-24

## 2020-09-24 RX ORDER — INSULIN LISPRO 100 [IU]/ML
100 INJECTION, SOLUTION INTRAVENOUS; SUBCUTANEOUS
Qty: 1 | Refills: 2 | Status: DISCONTINUED | COMMUNITY
Start: 2020-07-08 | End: 2020-09-24

## 2020-09-24 RX ORDER — PEN NEEDLE, DIABETIC 29 G X1/2"
32G X 4 MM NEEDLE, DISPOSABLE MISCELLANEOUS
Qty: 1 | Refills: 1 | Status: DISCONTINUED | COMMUNITY
Start: 2020-07-08 | End: 2020-09-24

## 2020-09-24 RX ORDER — METFORMIN ER 500 MG 500 MG/1
500 TABLET ORAL
Qty: 1 | Refills: 2 | Status: DISCONTINUED | COMMUNITY
Start: 2020-07-08 | End: 2020-09-24

## 2020-09-24 RX ORDER — LANCETS 28 GAUGE
EACH MISCELLANEOUS
Qty: 100 | Refills: 0 | Status: DISCONTINUED | COMMUNITY
Start: 2020-06-29 | End: 2020-09-24

## 2020-09-24 RX ORDER — BLOOD SUGAR DIAGNOSTIC
STRIP MISCELLANEOUS
Qty: 2 | Refills: 2 | Status: DISCONTINUED | COMMUNITY
Start: 2018-08-20 | End: 2020-09-24

## 2020-09-24 RX ORDER — FAMOTIDINE 10 MG/1
TABLET, FILM COATED ORAL
Refills: 0 | Status: DISCONTINUED | COMMUNITY
End: 2020-09-24

## 2020-09-24 RX ORDER — ISOPROPYL ALCOHOL 0.7 ML/ML
SWAB TOPICAL
Qty: 1 | Refills: 2 | Status: DISCONTINUED | COMMUNITY
Start: 2020-07-08 | End: 2020-09-24

## 2020-09-24 NOTE — ASSESSMENT
[FreeTextEntry1] : S/p fall 1 week ago with continued coccyx and toe pain.  Will get x-ray.\par \par H/o gestational diabetes:\par Gave birth 2 months ago, no longer on meds or checking her sugar\par Will get A1C today, GGT to be done with gyn \par \par Overweight:\par Daily exercise\par Will discuss nutrition when speak regarding labs\par \par Form to be completed to work\par \par HM:\par UTD pap\par flu shot today\par UTD tdap\par

## 2020-09-24 NOTE — REVIEW OF SYSTEMS
[Negative] : Heme/Lymph [Joint Pain] : no joint pain [Muscle Weakness] : no muscle weakness [Back Pain] : no back pain [FreeTextEntry9] : see HPI - right toe pain, tailbone pain

## 2020-09-24 NOTE — HEALTH RISK ASSESSMENT
[Good] : ~his/her~  mood as  good [No] : In the past 12 months have you used drugs other than those required for medical reasons? No [0] : 2) Feeling down, depressed, or hopeless: Not at all (0) [Patient reported PAP Smear was normal] : Patient reported PAP Smear was normal [With Family] : lives with family [Employed] : employed [] :  [# Of Children ___] : has [unfilled] children [Smoke Detector] : smoke detector [Carbon Monoxide Detector] : carbon monoxide detector [] : No [de-identified] : none  [EZR0Hexmk] : 0 [Sexually Active] : not sexually active [Reports changes in hearing] : Reports no changes in hearing [Reports changes in vision] : Reports no changes in vision [Reports changes in dental health] : Reports no changes in dental health [Guns at Home] : no guns at home [PapSmearDate] : 11/2019 [de-identified] : , mom, 3 kids (8, 22mo, 2mo)

## 2020-09-24 NOTE — PHYSICAL EXAM
[No Carotid Bruits] : no carotid bruits [Pedal Pulses Present] : the pedal pulses are present [No Edema] : there was no peripheral edema [No Extremity Clubbing/Cyanosis] : no extremity clubbing/cyanosis [No CVA Tenderness] : no CVA  tenderness [Normal] : affect was normal and insight and judgment were intact [de-identified] : tender at coccyx  [de-identified] : tender at right 4th toe

## 2020-09-24 NOTE — HISTORY OF PRESENT ILLNESS
[de-identified] : 35 y.o. female.  PMH hypothyroidism, gestational diabetes.  Presents for CPE, wellness visit.  \par \par Gave birth to baby girl 2 months ago.  Doing well.  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  \par Not breast feeding.  \par \par Metformin and insulin for a few weeks at the end of pregnancy.  No longer checking sugars.  \par \par Fell one week ago, right 4th toe has been hurting since, slightly swollen.  Also fell on her coccyx, pain there improving. \par \par No current exercise.  \par

## 2020-09-28 LAB
25(OH)D3 SERPL-MCNC: 14.9 NG/ML
ALBUMIN SERPL ELPH-MCNC: 4.5 G/DL
ALP BLD-CCNC: 72 U/L
ALT SERPL-CCNC: 21 U/L
ANION GAP SERPL CALC-SCNC: 14 MMOL/L
AST SERPL-CCNC: 19 U/L
BASOPHILS # BLD AUTO: 0.09 K/UL
BASOPHILS NFR BLD AUTO: 1.1 %
BILIRUB SERPL-MCNC: 0.3 MG/DL
BUN SERPL-MCNC: 12 MG/DL
CALCIUM SERPL-MCNC: 10.1 MG/DL
CHLORIDE SERPL-SCNC: 102 MMOL/L
CHOLEST SERPL-MCNC: 153 MG/DL
CHOLEST/HDLC SERPL: 3.6 RATIO
CO2 SERPL-SCNC: 25 MMOL/L
CREAT SERPL-MCNC: 0.72 MG/DL
EOSINOPHIL # BLD AUTO: 0.35 K/UL
EOSINOPHIL NFR BLD AUTO: 4.4 %
ESTIMATED AVERAGE GLUCOSE: 117 MG/DL
FOLATE SERPL-MCNC: 11.4 NG/ML
GLUCOSE SERPL-MCNC: 71 MG/DL
HBA1C MFR BLD HPLC: 5.7 %
HCT VFR BLD CALC: 42.2 %
HDLC SERPL-MCNC: 42 MG/DL
HGB BLD-MCNC: 13.2 G/DL
IMM GRANULOCYTES NFR BLD AUTO: 0.1 %
LDLC SERPL CALC-MCNC: 89 MG/DL
LYMPHOCYTES # BLD AUTO: 2.78 K/UL
LYMPHOCYTES NFR BLD AUTO: 35 %
M TB IFN-G BLD-IMP: NEGATIVE
MAN DIFF?: NORMAL
MCHC RBC-ENTMCNC: 29.8 PG
MCHC RBC-ENTMCNC: 31.3 GM/DL
MCV RBC AUTO: 95.3 FL
MONOCYTES # BLD AUTO: 0.73 K/UL
MONOCYTES NFR BLD AUTO: 9.2 %
NEUTROPHILS # BLD AUTO: 3.99 K/UL
NEUTROPHILS NFR BLD AUTO: 50.2 %
PLATELET # BLD AUTO: 329 K/UL
POTASSIUM SERPL-SCNC: 4.9 MMOL/L
PROT SERPL-MCNC: 7.5 G/DL
QUANTIFERON TB PLUS MITOGEN MINUS NIL: 7.7 IU/ML
QUANTIFERON TB PLUS NIL: 0.03 IU/ML
QUANTIFERON TB PLUS TB1 MINUS NIL: -0.01 IU/ML
QUANTIFERON TB PLUS TB2 MINUS NIL: 0 IU/ML
RBC # BLD: 4.43 M/UL
RBC # FLD: 13.2 %
SARS-COV-2 N GENE NPH QL NAA+PROBE: NOT DETECTED
SODIUM SERPL-SCNC: 141 MMOL/L
TRIGL SERPL-MCNC: 105 MG/DL
TSH SERPL-ACNC: 4.55 UIU/ML
VIT B12 SERPL-MCNC: 534 PG/ML
WBC # FLD AUTO: 7.95 K/UL

## 2021-01-26 ENCOUNTER — NON-APPOINTMENT (OUTPATIENT)
Age: 36
End: 2021-01-26

## 2021-08-09 NOTE — OB PROVIDER DELIVERY SUMMARY - NS_DELIVERYATTENDING1_OBGYN_ALL_OB_FT
How Severe Is Your Atopic Dermatitis?: severe Is This A New Presentation, Or A Follow-Up?: Follow Up Atopic Dermatitis Ayaan Benavides

## 2021-08-12 ENCOUNTER — TRANSCRIPTION ENCOUNTER (OUTPATIENT)
Age: 36
End: 2021-08-12

## 2021-09-28 ENCOUNTER — APPOINTMENT (OUTPATIENT)
Dept: OBGYN | Facility: CLINIC | Age: 36
End: 2021-09-28
Payer: COMMERCIAL

## 2021-09-28 VITALS
SYSTOLIC BLOOD PRESSURE: 111 MMHG | WEIGHT: 212 LBS | DIASTOLIC BLOOD PRESSURE: 76 MMHG | HEIGHT: 63 IN | HEART RATE: 80 BPM | TEMPERATURE: 96.8 F | BODY MASS INDEX: 37.56 KG/M2

## 2021-09-28 PROBLEM — O26.892 HEARTBURN DURING PREGNANCY IN SECOND TRIMESTER: Status: RESOLVED | Noted: 2018-08-20 | Resolved: 2021-09-28

## 2021-09-28 PROBLEM — Z87.59 HISTORY OF THREATENED ABORTION: Status: RESOLVED | Noted: 2020-01-08 | Resolved: 2021-09-28

## 2021-09-28 PROBLEM — O09.299 PREGNANCY WITH POOR OBSTETRIC HISTORY: Status: RESOLVED | Noted: 2018-03-28 | Resolved: 2021-09-28

## 2021-09-28 PROBLEM — O36.80X1 ENCOUNTER TO DETERMINE FETAL VIABILITY OF PREGNANCY, FETUS 1: Status: RESOLVED | Noted: 2019-12-12 | Resolved: 2021-09-28

## 2021-09-28 PROBLEM — Z87.59 HISTORY OF SPONTANEOUS ABORTION: Status: RESOLVED | Noted: 2017-10-13 | Resolved: 2021-09-28

## 2021-09-28 PROBLEM — O23.40 UTI (URINARY TRACT INFECTION) DURING PREGNANCY: Status: RESOLVED | Noted: 2018-06-15 | Resolved: 2021-09-28

## 2021-09-28 PROBLEM — O09.521 ELDERLY MULTIGRAVIDA IN FIRST TRIMESTER: Status: RESOLVED | Noted: 2020-05-25 | Resolved: 2021-09-28

## 2021-09-28 PROBLEM — Z32.01 PREGNANCY TEST-POSITIVE: Status: RESOLVED | Noted: 2019-12-12 | Resolved: 2021-09-28

## 2021-09-28 PROCEDURE — 99213 OFFICE O/P EST LOW 20 MIN: CPT

## 2021-09-28 RX ORDER — PRENATAL VIT NO.126/IRON/FOLIC 28MG-0.8MG
28-0.8 TABLET ORAL DAILY
Qty: 90 | Refills: 3 | Status: DISCONTINUED | COMMUNITY
Start: 2020-01-08 | End: 2021-09-28

## 2021-09-28 RX ORDER — LEVOTHYROXINE SODIUM 0.03 MG/1
25 TABLET ORAL
Qty: 90 | Refills: 1 | Status: DISCONTINUED | COMMUNITY
Start: 2019-06-28 | End: 2021-09-28

## 2021-09-28 RX ORDER — LEVOTHYROXINE SODIUM 0.2 MG/1
200 TABLET ORAL DAILY
Qty: 90 | Refills: 1 | Status: DISCONTINUED | COMMUNITY
Start: 2017-11-14 | End: 2021-09-28

## 2021-09-28 RX ORDER — FAMOTIDINE 20 MG/1
20 TABLET, FILM COATED ORAL
Qty: 90 | Refills: 1 | Status: DISCONTINUED | COMMUNITY
Start: 2020-09-24 | End: 2021-09-28

## 2021-09-28 NOTE — PHYSICAL EXAM
[Labia Majora] : normal [Labia Minora] : normal [Moderate] : There was moderate vaginal bleeding [Normal] : normal [Uterine Adnexae] : non-palpable [Tenderness] : nontender

## 2021-09-29 ENCOUNTER — NON-APPOINTMENT (OUTPATIENT)
Age: 36
End: 2021-09-29

## 2021-10-01 ENCOUNTER — NON-APPOINTMENT (OUTPATIENT)
Age: 36
End: 2021-10-01

## 2021-10-01 LAB
C TRACH RRNA SPEC QL NAA+PROBE: NOT DETECTED
HCG SERPL-MCNC: <1 MIU/ML
N GONORRHOEA RRNA SPEC QL NAA+PROBE: NOT DETECTED
SOURCE AMPLIFICATION: NORMAL
TSH SERPL-ACNC: 37.5 UIU/ML

## 2021-11-05 ENCOUNTER — TRANSCRIPTION ENCOUNTER (OUTPATIENT)
Age: 36
End: 2021-11-05

## 2021-11-30 ENCOUNTER — APPOINTMENT (OUTPATIENT)
Dept: INTERNAL MEDICINE | Facility: CLINIC | Age: 36
End: 2021-11-30
Payer: COMMERCIAL

## 2021-11-30 VITALS
HEART RATE: 90 BPM | TEMPERATURE: 99 F | OXYGEN SATURATION: 98 % | DIASTOLIC BLOOD PRESSURE: 60 MMHG | SYSTOLIC BLOOD PRESSURE: 106 MMHG | WEIGHT: 204 LBS | BODY MASS INDEX: 36.14 KG/M2

## 2021-11-30 DIAGNOSIS — Z00.00 ENCOUNTER FOR GENERAL ADULT MEDICAL EXAMINATION W/OUT ABNORMAL FINDINGS: ICD-10-CM

## 2021-11-30 PROCEDURE — 99395 PREV VISIT EST AGE 18-39: CPT | Mod: 25

## 2021-11-30 PROCEDURE — 90686 IIV4 VACC NO PRSV 0.5 ML IM: CPT

## 2021-11-30 PROCEDURE — 36415 COLL VENOUS BLD VENIPUNCTURE: CPT

## 2021-11-30 PROCEDURE — G0008: CPT

## 2021-12-01 NOTE — ASSESSMENT
[FreeTextEntry1] : 37yo F pmh of hypothyroidism, miscarriages seen for cpe\par \par \par CPE today\par Labs today \par Flu shot today\par \par rec ophtho\par rec dental\par rec derm\par \par \par

## 2021-12-01 NOTE — HISTORY OF PRESENT ILLNESS
[FreeTextEntry1] : cpe [de-identified] : 37yo F with hx of multiple miscarriages, hypothyroidism seen for cpe\par \par missed thyroid meds - noticed weight gain, hair loss, and period was out of wack\par saw GYN who did blood work and noted tsh of ~38\par now pt is back on meds\par \par has 3 kids -- 9yrs, 3yrs, 1yrs -\par \par Remainder of ROS reviewed and found to be negative.\par

## 2021-12-01 NOTE — PHYSICAL EXAM
[de-identified] : Gen: Adult F, NAD\par Head: NC/AT\par EENT: ears grossly normal, PERRL, EOMI, moist mucosa\par Neck: supple\par Chest wall: nontender\par CV: normal s1 +s2, rrr, no murmurs\par Pulm: CTA-B\par Abd: soft, NT, ND\par Skin: no rashes\par Back: no CVA tenderness, no spinal tenderness\par Neuro: gait normal, AAOx3\par Psych: normal affect, normal interaction\par

## 2021-12-03 LAB
25(OH)D3 SERPL-MCNC: 16.2 NG/ML
ALBUMIN SERPL ELPH-MCNC: 4.4 G/DL
ALP BLD-CCNC: 61 U/L
ALT SERPL-CCNC: 19 U/L
ANION GAP SERPL CALC-SCNC: 13 MMOL/L
AST SERPL-CCNC: 18 U/L
BASOPHILS # BLD AUTO: 0.07 K/UL
BASOPHILS NFR BLD AUTO: 0.9 %
BILIRUB SERPL-MCNC: 0.2 MG/DL
BUN SERPL-MCNC: 15 MG/DL
CALCIUM SERPL-MCNC: 9.6 MG/DL
CHLORIDE SERPL-SCNC: 105 MMOL/L
CHOLEST SERPL-MCNC: 133 MG/DL
CO2 SERPL-SCNC: 23 MMOL/L
CREAT SERPL-MCNC: 0.78 MG/DL
EOSINOPHIL # BLD AUTO: 0.28 K/UL
EOSINOPHIL NFR BLD AUTO: 3.7 %
ESTIMATED AVERAGE GLUCOSE: 117 MG/DL
GLUCOSE SERPL-MCNC: 104 MG/DL
HBA1C MFR BLD HPLC: 5.7 %
HCT VFR BLD CALC: 40.7 %
HDLC SERPL-MCNC: 31 MG/DL
HGB BLD-MCNC: 12.9 G/DL
IMM GRANULOCYTES NFR BLD AUTO: 0.3 %
LDLC SERPL CALC-MCNC: 79 MG/DL
LYMPHOCYTES # BLD AUTO: 2.93 K/UL
LYMPHOCYTES NFR BLD AUTO: 38.9 %
MAN DIFF?: NORMAL
MCHC RBC-ENTMCNC: 29.1 PG
MCHC RBC-ENTMCNC: 31.7 GM/DL
MCV RBC AUTO: 91.9 FL
MONOCYTES # BLD AUTO: 0.52 K/UL
MONOCYTES NFR BLD AUTO: 6.9 %
NEUTROPHILS # BLD AUTO: 3.72 K/UL
NEUTROPHILS NFR BLD AUTO: 49.3 %
NONHDLC SERPL-MCNC: 102 MG/DL
PLATELET # BLD AUTO: 368 K/UL
POTASSIUM SERPL-SCNC: 4.4 MMOL/L
PROT SERPL-MCNC: 7.4 G/DL
RBC # BLD: 4.43 M/UL
RBC # FLD: 12.6 %
SODIUM SERPL-SCNC: 141 MMOL/L
T4 FREE SERPL-MCNC: 2 NG/DL
TRIGL SERPL-MCNC: 116 MG/DL
TSH SERPL-ACNC: 0.06 UIU/ML
VIT B12 SERPL-MCNC: 454 PG/ML
WBC # FLD AUTO: 7.54 K/UL

## 2022-01-15 NOTE — DISCHARGE NOTE OB - CARE PROVIDERS DIRECT ADDRESSES
[facility] Progress Note      Patient Name: Fany Todd  : 1946  MRN: [id number]  Primary Care Physician:  Lilibeth Cota MD  Date of admission: 1/10/2022    Subjective     Interval History:   Follow-up end-stage renal disease on peritoneal dialysis    Just underwent colonoscopy and endoscopy this morning  Found to have nonbleeding duodenal ulcer  Sent for H. pylori  Otherwise feeling better overall  Tolerating peritoneal dialysis quite well    Review of Systems:   As noted above    Objective     Vitals:   Temp:  [97.7 °F (36.5 °C)-98 °F (36.7 °C)] 98 °F (36.7 °C)  Heart Rate:  [68-84] 68  Resp:  [16-18] 18  BP: (104-134)/(56-78) 108/61    Intake/Output Summary (Last 24 hours) at 1/15/2022 1444  Last data filed at 1/15/2022 1218  Gross per 24 hour   Intake 8010 ml   Output 8800 ml   Net -790 ml       Physical Exam:    Constitutional: Awake, alert, no acute distress.  Appears to be chronically ill and frail  HEENT: Sclera anicteric  Neck: trachea at midline, no JVD  Respiratory: Clear to auscultation bilaterally  Cardiovascular: RRR, no murmurs, no rubs or gallops  Gastrointestinal: abdomen is soft, nontender and nondistended, PD catheter in place exit site is clean and nontender  Musculoskeletal: No edema  Neurologic: Oriented x3, moving all extremities    Scheduled Meds:     calcitriol, 0.25 mcg, Oral, Daily  cholecalciferol, 1,000 Units, Oral, Daily  levothyroxine, 25 mcg, Oral, Q AM  pantoprazole, 40 mg, Intravenous, Q12H  sevelamer, 2,400 mg, Oral, TID With Meals  sucralfate, 1 g, Oral, 4x Daily AC & at Bedtime      IV Meds:   sodium chloride, 30 mL/hr, Last Rate: Stopped (01/15/22 1218)        Results Reviewed:   I have personally reviewed the results from the time of this admission to 1/15/2022 14:44 EST     Results from last 7 days   Lab Units 01/15/22  0544 22  0712 22  0941 22  0834 22  0834 01/10/22  1812 01/10/22  1812   SODIUM mmol/L  135* 134* 136   < > 135*   < > 137   POTASSIUM mmol/L 3.5 3.9 4.1   < > 3.9   < > 5.1   CHLORIDE mmol/L 90* 93* 95*   < > 93*   < > 91*   CO2 mmol/L 27.9 25.0 27.3   < > 25.7   < > 24.7   BUN mg/dL 40* 51* 54*   < > 67*   < > 78*   CREATININE mg/dL 8.29* 9.46* 10.34*   < > 12.27*   < > 12.19*   CALCIUM mg/dL 8.9 8.4* 8.4*   < > 7.9*   < > 8.7   BILIRUBIN mg/dL  --   --   --   --  <0.2  --  0.2   ALK PHOS U/L  --   --   --   --  77  --  107   ALT (SGPT) U/L  --   --   --   --  5  --  7   AST (SGOT) U/L  --   --   --   --  12  --  13   GLUCOSE mg/dL 108* 121* 110*   < > 121*   < > 132*    < > = values in this interval not displayed.       Estimated Creatinine Clearance: 4.8 mL/min (A) (by C-G formula based on SCr of 8.29 mg/dL (H)).    Results from last 7 days   Lab Units 01/15/22  0544 01/14/22  0712 01/10/22  1812   MAGNESIUM mg/dL  --   --  1.8   PHOSPHORUS mg/dL 6.1* 7.2*  --              Results from last 7 days   Lab Units 01/15/22  0544 01/14/22  0712 01/13/22  0941 01/12/22  1206 01/12/22  0834 01/10/22  1812 01/10/22  1812   WBC 10*3/mm3 7.76 7.32 11.88*  --  13.20*  --  13.89*   HEMOGLOBIN g/dL 9.6* 9.1* 8.9* 5.4* 5.1*   < > 8.8*   PLATELETS 10*3/mm3 398 357 317  --  318  --  444    < > = values in this interval not displayed.             Assessment / Plan     ASSESSMENT:  1. ESRD on peritoneal dialysis  2.  Hypertension  3.  Anemia of chronic kidney disease  4. history of breast cancer of the left breast with mastectomy treated surgically with no evidence of recurrence  5.  Hyperphosphatemia  6. protein calorie malnutrition  7.  Status post EGD/colonoscopy on 1/15 with evidence of duodenal ulcer, sent for H. pylori, currently on PPI twice daily    PLAN:  1.  Continue same PD prescription with alternating 2.5/1.5% dextrose solutions, 2 L for 5 exchanges daily  2.  Surveillance labs    Lobito Zhu MD  01/15/22  14:44 [facility]    [facility]  [phone]            [email or web address]

## 2022-04-28 ENCOUNTER — APPOINTMENT (OUTPATIENT)
Dept: INTERNAL MEDICINE | Facility: CLINIC | Age: 37
End: 2022-04-28
Payer: COMMERCIAL

## 2022-04-28 VITALS
WEIGHT: 206 LBS | TEMPERATURE: 98.5 F | HEART RATE: 74 BPM | DIASTOLIC BLOOD PRESSURE: 64 MMHG | OXYGEN SATURATION: 98 % | HEIGHT: 63 IN | SYSTOLIC BLOOD PRESSURE: 108 MMHG | BODY MASS INDEX: 36.5 KG/M2

## 2022-04-28 PROCEDURE — 99213 OFFICE O/P EST LOW 20 MIN: CPT

## 2022-04-28 RX ORDER — ERGOCALCIFEROL 1.25 MG/1
1.25 MG CAPSULE, LIQUID FILLED ORAL
Qty: 12 | Refills: 0 | Status: DISCONTINUED | COMMUNITY
Start: 2020-09-28 | End: 2022-04-28

## 2022-04-28 NOTE — ASSESSMENT
[FreeTextEntry1] : Pregnancy, hypothyroidism, nausea and vomiting of pregnancy:\par Check labs today\par OB appointment scheduled, but next available not for several weeks\par Will start progesterone suppository d/t miscarriage history, not covered at local [facility] new Rx sent to mail order\par Doxylamine-pyridoxine for nausea and vomiting \par PRN tylenol for headache r/t vomiting, advised caution, use as infrequently as possible\par

## 2022-04-28 NOTE — HISTORY OF PRESENT ILLNESS
[FreeTextEntry8] : 36 y.o. female, PMHx hypothyroidism, epigastric pain, GDM, fibroids. \par \par Positive home pregnancy test.  \par Periods have been regular.  LMP March 10.  \par H/o multiple miscarriages.  Had used progesterone suppositories with good effect with 3 successful past pregnancies.  \par Appointment with OB on 5/26.  \par Hyperemesis with every pregnancy, occurring now.   Would like to take something for the nausea and vomiting. \par Feeling otherwise well. \par Needs to do labs today.  Taking Synthroid 200.  \par

## 2022-05-02 LAB
ANION GAP SERPL CALC-SCNC: 15 MMOL/L
BASOPHILS # BLD AUTO: 0.05 K/UL
BASOPHILS NFR BLD AUTO: 0.5 %
BUN SERPL-MCNC: 10 MG/DL
C TRACH RRNA SPEC QL NAA+PROBE: NOT DETECTED
CALCIUM SERPL-MCNC: 9 MG/DL
CHLORIDE SERPL-SCNC: 103 MMOL/L
CO2 SERPL-SCNC: 19 MMOL/L
CREAT SERPL-MCNC: 0.53 MG/DL
EGFR: 123 ML/MIN/1.73M2
EOSINOPHIL # BLD AUTO: 0.18 K/UL
EOSINOPHIL NFR BLD AUTO: 1.9 %
FERRITIN SERPL-MCNC: 92 NG/ML
GLUCOSE SERPL-MCNC: 100 MG/DL
HCG SERPL-MCNC: ABNORMAL MIU/ML
HCT VFR BLD CALC: 38 %
HCV AB SER QL: NONREACTIVE
HCV S/CO RATIO: 0.14 S/CO
HGB BLD-MCNC: 12.3 G/DL
HIV1+2 AB SPEC QL IA.RAPID: NONREACTIVE
IMM GRANULOCYTES NFR BLD AUTO: 0.2 %
LYMPHOCYTES # BLD AUTO: 2.56 K/UL
LYMPHOCYTES NFR BLD AUTO: 26.9 %
MAN DIFF?: NORMAL
MCHC RBC-ENTMCNC: 29.5 PG
MCHC RBC-ENTMCNC: 32.4 GM/DL
MCV RBC AUTO: 91.1 FL
MONOCYTES # BLD AUTO: 0.65 K/UL
MONOCYTES NFR BLD AUTO: 6.8 %
N GONORRHOEA RRNA SPEC QL NAA+PROBE: NOT DETECTED
NEUTROPHILS # BLD AUTO: 6.07 K/UL
NEUTROPHILS NFR BLD AUTO: 63.7 %
PLATELET # BLD AUTO: 311 K/UL
POTASSIUM SERPL-SCNC: 4.2 MMOL/L
RBC # BLD: 4.17 M/UL
RBC # FLD: 13.6 %
SODIUM SERPL-SCNC: 138 MMOL/L
SOURCE AMPLIFICATION: NORMAL
T PALLIDUM AB SER QL IA: NEGATIVE
TSH SERPL-ACNC: 3.41 UIU/ML
WBC # FLD AUTO: 9.53 K/UL

## 2022-05-10 LAB — HCG SERPL-MCNC: ABNORMAL MIU/ML

## 2022-05-26 ENCOUNTER — ASOB RESULT (OUTPATIENT)
Age: 37
End: 2022-05-26

## 2022-05-26 ENCOUNTER — APPOINTMENT (OUTPATIENT)
Dept: OBGYN | Facility: CLINIC | Age: 37
End: 2022-05-26
Payer: COMMERCIAL

## 2022-05-26 VITALS
SYSTOLIC BLOOD PRESSURE: 136 MMHG | WEIGHT: 211 LBS | DIASTOLIC BLOOD PRESSURE: 84 MMHG | HEIGHT: 63 IN | HEART RATE: 88 BPM | BODY MASS INDEX: 37.39 KG/M2

## 2022-05-26 PROCEDURE — 99214 OFFICE O/P EST MOD 30 MIN: CPT

## 2022-05-26 PROCEDURE — 76801 OB US < 14 WKS SINGLE FETUS: CPT

## 2022-05-26 RX ORDER — PRENATAL VIT 49/IRON FUM/FOLIC 6.75-0.2MG
TABLET ORAL
Refills: 0 | Status: ACTIVE | COMMUNITY

## 2022-05-28 LAB
ABO + RH PNL BLD: NORMAL
B19V IGG SER QL IA: 0.43 INDEX
B19V IGG+IGM SER-IMP: NEGATIVE
B19V IGG+IGM SER-IMP: NORMAL
B19V IGM FLD-ACNC: 0.07 INDEX
B19V IGM SER-ACNC: NEGATIVE
BACTERIA UR CULT: NORMAL
BASOPHILS # BLD AUTO: 0.07 K/UL
BASOPHILS NFR BLD AUTO: 0.6 %
BLD GP AB SCN SERPL QL: NORMAL
C TRACH RRNA SPEC QL NAA+PROBE: NOT DETECTED
EOSINOPHIL # BLD AUTO: 0.27 K/UL
EOSINOPHIL NFR BLD AUTO: 2.2 %
HBV SURFACE AG SER QL: NONREACTIVE
HCT VFR BLD CALC: 38.9 %
HCV AB SER QL: NONREACTIVE
HCV S/CO RATIO: 0.15 S/CO
HGB BLD-MCNC: 13 G/DL
HIV1+2 AB SPEC QL IA.RAPID: NONREACTIVE
IMM GRANULOCYTES NFR BLD AUTO: 0.3 %
LEAD BLD-MCNC: <1 UG/DL
LYMPHOCYTES # BLD AUTO: 3.92 K/UL
LYMPHOCYTES NFR BLD AUTO: 31.7 %
MAN DIFF?: NORMAL
MCHC RBC-ENTMCNC: 30.3 PG
MCHC RBC-ENTMCNC: 33.4 GM/DL
MCV RBC AUTO: 90.7 FL
MONOCYTES # BLD AUTO: 0.93 K/UL
MONOCYTES NFR BLD AUTO: 7.5 %
N GONORRHOEA RRNA SPEC QL NAA+PROBE: NOT DETECTED
NEUTROPHILS # BLD AUTO: 7.14 K/UL
NEUTROPHILS NFR BLD AUTO: 57.7 %
PLATELET # BLD AUTO: 314 K/UL
RBC # BLD: 4.29 M/UL
RBC # FLD: 13.2 %
RUBV IGG FLD-ACNC: 1 INDEX
RUBV IGG SER-IMP: NORMAL
SOURCE AMPLIFICATION: NORMAL
T PALLIDUM AB SER QL IA: NEGATIVE
T3FREE SERPL-MCNC: 3.55 PG/ML
T4 FREE SERPL-MCNC: 1.4 NG/DL
TSH SERPL-ACNC: 2 UIU/ML
VZV AB TITR SER: POSITIVE
VZV IGG SER IF-ACNC: 1132 INDEX
WBC # FLD AUTO: 12.37 K/UL

## 2022-06-03 ENCOUNTER — TRANSCRIPTION ENCOUNTER (OUTPATIENT)
Age: 37
End: 2022-06-03

## 2022-06-08 ENCOUNTER — ASOB RESULT (OUTPATIENT)
Age: 37
End: 2022-06-08

## 2022-06-08 ENCOUNTER — APPOINTMENT (OUTPATIENT)
Dept: ANTEPARTUM | Facility: CLINIC | Age: 37
End: 2022-06-08
Payer: COMMERCIAL

## 2022-06-08 PROCEDURE — 36416 COLLJ CAPILLARY BLOOD SPEC: CPT

## 2022-06-08 PROCEDURE — 76813 OB US NUCHAL MEAS 1 GEST: CPT

## 2022-06-08 PROCEDURE — ZZZZZ: CPT

## 2022-06-13 ENCOUNTER — APPOINTMENT (OUTPATIENT)
Dept: ANTEPARTUM | Facility: CLINIC | Age: 37
End: 2022-06-13

## 2022-06-21 NOTE — OB PROVIDER TRIAGE NOTE - NS_FETALHEARTRATE_OBGYN_ALL_OB_FT
If she feels he needs a dose change, I will need to see him before September.  Please use a same day in the next week to schedule med check.  Okay to do video visit if they prefer.  Vikki Toro MD    130

## 2022-06-22 LAB
1ST TRIMESTER DATA: NORMAL
ADDENDUM DOC: NORMAL
AFP PNL SERPL: NORMAL
AFP SERPL-ACNC: NORMAL
CLINICAL BIOCHEMIST REVIEW: NORMAL
FREE BETA HCG 1ST TRIMESTER: NORMAL
Lab: NORMAL
NOTES NTD: NORMAL
NT: NORMAL
PAPP-A SERPL-ACNC: NORMAL
TRISOMY 18/3: NORMAL

## 2022-06-30 ENCOUNTER — APPOINTMENT (OUTPATIENT)
Dept: OBGYN | Facility: CLINIC | Age: 37
End: 2022-06-30

## 2022-06-30 VITALS
BODY MASS INDEX: 38.09 KG/M2 | WEIGHT: 215 LBS | SYSTOLIC BLOOD PRESSURE: 116 MMHG | HEART RATE: 87 BPM | DIASTOLIC BLOOD PRESSURE: 78 MMHG | HEIGHT: 63 IN

## 2022-07-04 ENCOUNTER — TRANSCRIPTION ENCOUNTER (OUTPATIENT)
Age: 37
End: 2022-07-04

## 2022-07-05 LAB — GLUCOSE 1H P 50 G GLC PO SERPL-MCNC: 199 MG/DL

## 2022-07-28 ENCOUNTER — APPOINTMENT (OUTPATIENT)
Dept: OBGYN | Facility: CLINIC | Age: 37
End: 2022-07-28

## 2022-07-28 ENCOUNTER — LABORATORY RESULT (OUTPATIENT)
Age: 37
End: 2022-07-28

## 2022-07-28 VITALS
BODY MASS INDEX: 38.09 KG/M2 | HEIGHT: 63 IN | DIASTOLIC BLOOD PRESSURE: 72 MMHG | SYSTOLIC BLOOD PRESSURE: 111 MMHG | WEIGHT: 215 LBS

## 2022-07-28 PROCEDURE — 0502F SUBSEQUENT PRENATAL CARE: CPT

## 2022-08-01 ENCOUNTER — NON-APPOINTMENT (OUTPATIENT)
Age: 37
End: 2022-08-01

## 2022-08-01 LAB — TSH SERPL-ACNC: 12 UIU/ML

## 2022-08-03 ENCOUNTER — APPOINTMENT (OUTPATIENT)
Dept: ANTEPARTUM | Facility: CLINIC | Age: 37
End: 2022-08-03

## 2022-08-03 ENCOUNTER — ASOB RESULT (OUTPATIENT)
Age: 37
End: 2022-08-03

## 2022-08-03 PROCEDURE — 76811 OB US DETAILED SNGL FETUS: CPT | Mod: 59

## 2022-08-03 PROCEDURE — 76817 TRANSVAGINAL US OBSTETRIC: CPT

## 2022-08-07 ENCOUNTER — RX RENEWAL (OUTPATIENT)
Age: 37
End: 2022-08-07

## 2022-08-18 ENCOUNTER — APPOINTMENT (OUTPATIENT)
Dept: OBGYN | Facility: CLINIC | Age: 37
End: 2022-08-18

## 2022-08-18 VITALS
SYSTOLIC BLOOD PRESSURE: 104 MMHG | DIASTOLIC BLOOD PRESSURE: 71 MMHG | HEART RATE: 89 BPM | BODY MASS INDEX: 38.98 KG/M2 | WEIGHT: 220 LBS | HEIGHT: 63 IN

## 2022-08-18 LAB
2ND TRIMESTER DATA: NORMAL
AFP PNL SERPL: NORMAL
AFP SERPL-ACNC: NORMAL
CLINICAL BIOCHEMIST REVIEW: NORMAL
NOTES NTD: NORMAL

## 2022-09-01 ENCOUNTER — APPOINTMENT (OUTPATIENT)
Dept: ANTEPARTUM | Facility: CLINIC | Age: 37
End: 2022-09-01

## 2022-09-01 ENCOUNTER — ASOB RESULT (OUTPATIENT)
Age: 37
End: 2022-09-01

## 2022-09-01 PROCEDURE — 76816 OB US FOLLOW-UP PER FETUS: CPT

## 2022-09-06 ENCOUNTER — NON-APPOINTMENT (OUTPATIENT)
Age: 37
End: 2022-09-06

## 2022-09-12 ENCOUNTER — APPOINTMENT (OUTPATIENT)
Dept: MATERNAL FETAL MEDICINE | Facility: CLINIC | Age: 37
End: 2022-09-12

## 2022-09-20 ENCOUNTER — APPOINTMENT (OUTPATIENT)
Dept: MATERNAL FETAL MEDICINE | Facility: CLINIC | Age: 37
End: 2022-09-20

## 2022-09-20 ENCOUNTER — ASOB RESULT (OUTPATIENT)
Age: 37
End: 2022-09-20

## 2022-09-20 PROCEDURE — G0108 DIAB MANAGE TRN  PER INDIV: CPT | Mod: 95

## 2022-09-29 ENCOUNTER — APPOINTMENT (OUTPATIENT)
Dept: OBGYN | Facility: CLINIC | Age: 37
End: 2022-09-29

## 2022-09-29 VITALS
HEART RATE: 92 BPM | HEIGHT: 63 IN | SYSTOLIC BLOOD PRESSURE: 119 MMHG | BODY MASS INDEX: 39.51 KG/M2 | DIASTOLIC BLOOD PRESSURE: 79 MMHG | WEIGHT: 223 LBS

## 2022-09-29 PROCEDURE — 0502F SUBSEQUENT PRENATAL CARE: CPT

## 2022-09-30 ENCOUNTER — APPOINTMENT (OUTPATIENT)
Dept: ANTEPARTUM | Facility: CLINIC | Age: 37
End: 2022-09-30

## 2022-09-30 ENCOUNTER — ASOB RESULT (OUTPATIENT)
Age: 37
End: 2022-09-30

## 2022-09-30 PROCEDURE — 76816 OB US FOLLOW-UP PER FETUS: CPT

## 2022-10-01 ENCOUNTER — RX RENEWAL (OUTPATIENT)
Age: 37
End: 2022-10-01

## 2022-10-07 ENCOUNTER — APPOINTMENT (OUTPATIENT)
Dept: MATERNAL FETAL MEDICINE | Facility: CLINIC | Age: 37
End: 2022-10-07

## 2022-10-07 ENCOUNTER — ASOB RESULT (OUTPATIENT)
Age: 37
End: 2022-10-07

## 2022-10-07 VITALS — WEIGHT: 223 LBS | BODY MASS INDEX: 39.51 KG/M2 | HEIGHT: 63 IN

## 2022-10-07 PROCEDURE — G0108 DIAB MANAGE TRN  PER INDIV: CPT | Mod: 95

## 2022-10-13 ENCOUNTER — MED ADMIN CHARGE (OUTPATIENT)
Age: 37
End: 2022-10-13

## 2022-10-13 ENCOUNTER — APPOINTMENT (OUTPATIENT)
Dept: OBGYN | Facility: CLINIC | Age: 37
End: 2022-10-13
Payer: COMMERCIAL

## 2022-10-13 VITALS
HEIGHT: 63 IN | HEART RATE: 93 BPM | WEIGHT: 223 LBS | SYSTOLIC BLOOD PRESSURE: 113 MMHG | BODY MASS INDEX: 39.51 KG/M2 | DIASTOLIC BLOOD PRESSURE: 73 MMHG

## 2022-10-13 LAB
BASOPHILS # BLD AUTO: 0.07 K/UL
BASOPHILS NFR BLD AUTO: 0.7 %
EOSINOPHIL # BLD AUTO: 0.52 K/UL
EOSINOPHIL NFR BLD AUTO: 5.5 %
HCT VFR BLD CALC: 35.8 %
HGB BLD-MCNC: 12.2 G/DL
IMM GRANULOCYTES NFR BLD AUTO: 0.3 %
LYMPHOCYTES # BLD AUTO: 2.94 K/UL
LYMPHOCYTES NFR BLD AUTO: 31.3 %
MAN DIFF?: NORMAL
MCHC RBC-ENTMCNC: 30.1 PG
MCHC RBC-ENTMCNC: 34.1 GM/DL
MCV RBC AUTO: 88.4 FL
MONOCYTES # BLD AUTO: 0.75 K/UL
MONOCYTES NFR BLD AUTO: 8 %
NEUTROPHILS # BLD AUTO: 5.07 K/UL
NEUTROPHILS NFR BLD AUTO: 54.2 %
PLATELET # BLD AUTO: 276 K/UL
RBC # BLD: 4.05 M/UL
RBC # FLD: 13.7 %
T PALLIDUM AB SER QL IA: NEGATIVE
T3FREE SERPL-MCNC: 2.26 PG/ML
T4 FREE SERPL-MCNC: 0.8 NG/DL
TSH SERPL-ACNC: 22.7 UIU/ML
WBC # FLD AUTO: 9.38 K/UL

## 2022-10-13 PROCEDURE — 0502F SUBSEQUENT PRENATAL CARE: CPT

## 2022-10-17 ENCOUNTER — ASOB RESULT (OUTPATIENT)
Age: 37
End: 2022-10-17

## 2022-10-17 ENCOUNTER — APPOINTMENT (OUTPATIENT)
Dept: MATERNAL FETAL MEDICINE | Facility: CLINIC | Age: 37
End: 2022-10-17

## 2022-10-17 PROCEDURE — G0108 DIAB MANAGE TRN  PER INDIV: CPT | Mod: 95

## 2022-10-17 RX ORDER — METFORMIN HYDROCHLORIDE 500 MG/1
500 TABLET, COATED ORAL
Qty: 60 | Refills: 2 | Status: DISCONTINUED | COMMUNITY
Start: 2022-10-07 | End: 2022-10-17

## 2022-10-27 ENCOUNTER — NON-APPOINTMENT (OUTPATIENT)
Age: 37
End: 2022-10-27

## 2022-10-27 ENCOUNTER — APPOINTMENT (OUTPATIENT)
Dept: OBGYN | Facility: CLINIC | Age: 37
End: 2022-10-27
Payer: COMMERCIAL

## 2022-10-27 VITALS
WEIGHT: 216 LBS | DIASTOLIC BLOOD PRESSURE: 81 MMHG | BODY MASS INDEX: 38.27 KG/M2 | HEIGHT: 63 IN | SYSTOLIC BLOOD PRESSURE: 125 MMHG | HEART RATE: 98 BPM

## 2022-10-27 PROCEDURE — 0502F SUBSEQUENT PRENATAL CARE: CPT

## 2022-10-28 ENCOUNTER — APPOINTMENT (OUTPATIENT)
Dept: ANTEPARTUM | Facility: CLINIC | Age: 37
End: 2022-10-28

## 2022-10-28 ENCOUNTER — ASOB RESULT (OUTPATIENT)
Age: 37
End: 2022-10-28

## 2022-10-28 ENCOUNTER — APPOINTMENT (OUTPATIENT)
Dept: MATERNAL FETAL MEDICINE | Facility: CLINIC | Age: 37
End: 2022-10-28

## 2022-10-28 VITALS
SYSTOLIC BLOOD PRESSURE: 100 MMHG | WEIGHT: 218 LBS | HEART RATE: 90 BPM | OXYGEN SATURATION: 99 % | DIASTOLIC BLOOD PRESSURE: 64 MMHG | RESPIRATION RATE: 16 BRPM | HEIGHT: 63 IN | BODY MASS INDEX: 38.62 KG/M2

## 2022-10-28 DIAGNOSIS — O24.415 GESTATIONAL DIABETES MELLITUS IN PREGNANCY, CONTROLLED BY ORAL HYPOGLYCEMIC DRUGS: ICD-10-CM

## 2022-10-28 DIAGNOSIS — O09.522 SUPERVISION OF ELDERLY MULTIGRAVIDA, SECOND TRIMESTER: ICD-10-CM

## 2022-10-28 DIAGNOSIS — Z92.29 PERSONAL HISTORY OF OTHER DRUG THERAPY: ICD-10-CM

## 2022-10-28 DIAGNOSIS — L60.3 NAIL DYSTROPHY: ICD-10-CM

## 2022-10-28 DIAGNOSIS — K29.60 OTHER GASTRITIS W/OUT BLEEDING: ICD-10-CM

## 2022-10-28 DIAGNOSIS — Z91.81 HISTORY OF FALLING: ICD-10-CM

## 2022-10-28 DIAGNOSIS — Z87.898 PERSONAL HISTORY OF OTHER SPECIFIED CONDITIONS: ICD-10-CM

## 2022-10-28 DIAGNOSIS — L65.9 NONSCARRING HAIR LOSS, UNSPECIFIED: ICD-10-CM

## 2022-10-28 DIAGNOSIS — O24.419 GESTATIONAL DIABETES MELLITUS IN PREGNANCY, UNSPECIFIED CONTROL: ICD-10-CM

## 2022-10-28 DIAGNOSIS — Z86.39 PERSONAL HISTORY OF OTHER ENDOCRINE, NUTRITIONAL AND METABOLIC DISEASE: ICD-10-CM

## 2022-10-28 DIAGNOSIS — Z87.09 PERSONAL HISTORY OF OTHER DISEASES OF THE RESPIRATORY SYSTEM: ICD-10-CM

## 2022-10-28 DIAGNOSIS — Z11.1 ENCOUNTER FOR SCREENING FOR RESPIRATORY TUBERCULOSIS: ICD-10-CM

## 2022-10-28 DIAGNOSIS — E03.9 HYPOTHYROIDISM, UNSPECIFIED: ICD-10-CM

## 2022-10-28 DIAGNOSIS — N92.6 IRREGULAR MENSTRUATION, UNSPECIFIED: ICD-10-CM

## 2022-10-28 DIAGNOSIS — Z86.32 PERSONAL HISTORY OF GESTATIONAL DIABETES: ICD-10-CM

## 2022-10-28 DIAGNOSIS — Z11.59 ENCOUNTER FOR SCREENING FOR OTHER VIRAL DISEASES: ICD-10-CM

## 2022-10-28 DIAGNOSIS — Z87.19 PERSONAL HISTORY OF OTHER DISEASES OF THE DIGESTIVE SYSTEM: ICD-10-CM

## 2022-10-28 DIAGNOSIS — J30.2 OTHER SEASONAL ALLERGIC RHINITIS: ICD-10-CM

## 2022-10-28 DIAGNOSIS — Z91.89 OTHER SPECIFIED PERSONAL RISK FACTORS, NOT ELSEWHERE CLASSIFIED: ICD-10-CM

## 2022-10-28 PROCEDURE — 76819 FETAL BIOPHYS PROFIL W/O NST: CPT

## 2022-10-28 PROCEDURE — 76820 UMBILICAL ARTERY ECHO: CPT | Mod: 59

## 2022-10-28 PROCEDURE — 99215 OFFICE O/P EST HI 40 MIN: CPT

## 2022-10-28 PROCEDURE — 36415 COLL VENOUS BLD VENIPUNCTURE: CPT

## 2022-10-28 PROCEDURE — 76816 OB US FOLLOW-UP PER FETUS: CPT | Mod: 59

## 2022-10-28 NOTE — DISCUSSION/SUMMARY
[FreeTextEntry1] : The patient is a 37-year-old G 13  seen today at 33 weeks for advanced maternal age, gestational diabetes A2, maternal obesity, hypothyroidism and recurrent pregnancy loss.\par \par Her obstetrical history is significant for delivery in , liveborn male  weighing 7 pounds 6 ounces delivered at term by normal spontaneous vaginal delivery.  That pregnancy was complicated by gestational diabetes A2 on Glyburide.  Subsequent pregnancy in  a liveborn male  weighing 6 pounds 12 ounces also delivered at 39 weeks via normal spontaneous vaginal delivery with gestational diabetes A2.  Last pregnancy in  a liveborn female  weighing 5 pounds 9 ounces delivered at 36-1/2 weeks via normal spontaneous vaginal delivery due to  contractions.  That pregnancy was also complicated by gestational diabetes A2.  She has had 1 surgical termination of pregnancy.  1 ectopic pregnancy.  A induction of labor at 21 weeks for fetal cranial anomaly and 8 spontaneous first trimester miscarriages.\par \par A growth scan was performed today and reveals a single viable intrauterine gestation with estimated fetal weight of 4 pounds 6 ounces which is consistent with the 22nd percentile.  Vertex presentation with anterior placenta seen and the amniotic fluid index is normal at 8.16 cm.  Normal umbilical artery Doppler S/D ratio.  Vital signs today reveal a blood pressure of 100/64 and maternal weight is 218 pounds which is consistent with a BMI of 38.60 kg.\par \par Gestational diabetes A2;\par \par Dietary consultation has been sent under separate cover.  The patient is using 2 units of regular insulin Premeal's.  Evaluation of her diabetic flowsheet is limited.  The patient does not routinely check her blood sugars.  In addition she does not always remember to use regular insulin premeals.  It is very difficult to make any recommendations based on the information presented today.  We had a long discussion about appropriate timing of meals as well as appropriate evaluation of the patient's blood sugars.  Hemoglobin A1c was obtained today.  Problems and complications related to a diabetic pregnancy including fetal macrosomia, increased risk for operative vaginal delivery and  section, shoulder dystocia with its associated morbidity mortality, stillbirth and increased incidence of  intensive care unit admission.  Weekly  testing until delivery is recommended.  A follow-up growth scan in 1 month is also recommended.  Follow-up dietary consultation is scheduled.  Follow-up maternal-fetal medicine consultation in 1 month is also recommended.  A 75 g 2-hour GCT is recommended after the patient's postpartum visit.\par \par Hypothyroidism;\par \par Patient is on 300 mcg of Synthroid daily.  Her most recent TSH is still elevated at 22 and her free T4 is at 0.7.  Endocrinology consultation is recommended.\par \par The patient is on low-dose aspirin alternating 1 tablet 2 tablets which she will take until approximately 38 weeks.\par \par COVID-19 vaccination;\par \par COVID-19 vaccination in pregnancy was discussed.  The patient has completed her initial vaccination as well as the booster vaccination with the Pfizer vaccine.  She will call your office should she have a COVID infection to discuss treatment options.\par \par Her mother and father have hypertension and diabetes.  She has hypothyroidism.  She has had 1 surgical termination of pregnancy and a cholecystectomy.  She has no known allergies to medications and denies alcohol, tobacco or drug use.\par \par I spent a total of 45 minutes reviewing the patient's prenatal record, prenatal blood work, outside medical records, previous consultations and ultrasound reports counseling and coordinating care.\par \par Recommendations;\par \par 1.  Continue current ADA diet and home glucose monitoring.\par 2.  2 units of regular insulin Premeal's.\par 3.  Weekly  testing until delivery.\par 4.  Growth scan 1 month is recommended.\par 5.  Hemoglobin A1c obtained in our office today.\par 6.  Endocrine consultation for the patient's hypothyroidism.\par 7.  Follow-up dietary consultation as scheduled.\par 8.  Follow-up maternal-fetal medicine consultation 1 month.\par 9.  75 g 2-hour GCT after the postpartum visit recommended.

## 2022-10-31 LAB
ESTIMATED AVERAGE GLUCOSE: 128 MG/DL
HBA1C MFR BLD HPLC: 6.1 %

## 2022-11-03 ENCOUNTER — APPOINTMENT (OUTPATIENT)
Dept: OBGYN | Facility: CLINIC | Age: 37
End: 2022-11-03

## 2022-11-03 ENCOUNTER — APPOINTMENT (OUTPATIENT)
Dept: MATERNAL FETAL MEDICINE | Facility: CLINIC | Age: 37
End: 2022-11-03

## 2022-11-03 ENCOUNTER — ASOB RESULT (OUTPATIENT)
Age: 37
End: 2022-11-03

## 2022-11-03 PROCEDURE — G0108 DIAB MANAGE TRN  PER INDIV: CPT | Mod: 95

## 2022-11-04 ENCOUNTER — APPOINTMENT (OUTPATIENT)
Dept: ANTEPARTUM | Facility: CLINIC | Age: 37
End: 2022-11-04

## 2022-11-04 ENCOUNTER — ASOB RESULT (OUTPATIENT)
Age: 37
End: 2022-11-04

## 2022-11-04 PROCEDURE — 76820 UMBILICAL ARTERY ECHO: CPT | Mod: 59

## 2022-11-04 PROCEDURE — 76819 FETAL BIOPHYS PROFIL W/O NST: CPT

## 2022-11-07 ENCOUNTER — APPOINTMENT (OUTPATIENT)
Dept: ANTEPARTUM | Facility: CLINIC | Age: 37
End: 2022-11-07

## 2022-11-07 ENCOUNTER — ASOB RESULT (OUTPATIENT)
Age: 37
End: 2022-11-07

## 2022-11-07 PROCEDURE — 93976 VASCULAR STUDY: CPT

## 2022-11-07 PROCEDURE — 76818 FETAL BIOPHYS PROFILE W/NST: CPT

## 2022-11-07 PROCEDURE — 76820 UMBILICAL ARTERY ECHO: CPT | Mod: 59

## 2022-11-07 PROCEDURE — ZZZZZ: CPT

## 2022-11-11 ENCOUNTER — ASOB RESULT (OUTPATIENT)
Age: 37
End: 2022-11-11

## 2022-11-11 ENCOUNTER — APPOINTMENT (OUTPATIENT)
Dept: ANTEPARTUM | Facility: CLINIC | Age: 37
End: 2022-11-11

## 2022-11-11 ENCOUNTER — APPOINTMENT (OUTPATIENT)
Dept: OBGYN | Facility: CLINIC | Age: 37
End: 2022-11-11

## 2022-11-11 VITALS
BODY MASS INDEX: 39.34 KG/M2 | HEART RATE: 94 BPM | WEIGHT: 222.06 LBS | DIASTOLIC BLOOD PRESSURE: 80 MMHG | SYSTOLIC BLOOD PRESSURE: 135 MMHG | HEIGHT: 63 IN

## 2022-11-11 VITALS — SYSTOLIC BLOOD PRESSURE: 125 MMHG | DIASTOLIC BLOOD PRESSURE: 84 MMHG

## 2022-11-11 PROCEDURE — 76818 FETAL BIOPHYS PROFILE W/NST: CPT

## 2022-11-11 PROCEDURE — 0502F SUBSEQUENT PRENATAL CARE: CPT

## 2022-11-11 PROCEDURE — 76820 UMBILICAL ARTERY ECHO: CPT | Mod: 59

## 2022-11-11 PROCEDURE — ZZZZZ: CPT

## 2022-11-14 ENCOUNTER — NON-APPOINTMENT (OUTPATIENT)
Age: 37
End: 2022-11-14

## 2022-11-15 ENCOUNTER — APPOINTMENT (OUTPATIENT)
Dept: OBGYN | Facility: CLINIC | Age: 37
End: 2022-11-15
Payer: COMMERCIAL

## 2022-11-15 VITALS
WEIGHT: 219 LBS | HEIGHT: 63 IN | SYSTOLIC BLOOD PRESSURE: 125 MMHG | BODY MASS INDEX: 38.8 KG/M2 | DIASTOLIC BLOOD PRESSURE: 80 MMHG

## 2022-11-15 PROCEDURE — 0502F SUBSEQUENT PRENATAL CARE: CPT

## 2022-11-17 ENCOUNTER — APPOINTMENT (OUTPATIENT)
Dept: MATERNAL FETAL MEDICINE | Facility: CLINIC | Age: 37
End: 2022-11-17

## 2022-11-17 ENCOUNTER — ASOB RESULT (OUTPATIENT)
Age: 37
End: 2022-11-17

## 2022-11-17 PROCEDURE — G0108 DIAB MANAGE TRN  PER INDIV: CPT | Mod: 95

## 2022-11-18 ENCOUNTER — APPOINTMENT (OUTPATIENT)
Dept: ANTEPARTUM | Facility: CLINIC | Age: 37
End: 2022-11-18
Payer: COMMERCIAL

## 2022-11-18 ENCOUNTER — ASOB RESULT (OUTPATIENT)
Age: 37
End: 2022-11-18

## 2022-11-18 PROCEDURE — ZZZZZ: CPT

## 2022-11-18 PROCEDURE — 76818 FETAL BIOPHYS PROFILE W/NST: CPT

## 2022-11-18 PROCEDURE — 76820 UMBILICAL ARTERY ECHO: CPT

## 2022-11-21 ENCOUNTER — INPATIENT (INPATIENT)
Facility: HOSPITAL | Age: 37
LOS: 0 days | Discharge: HOME | End: 2022-11-22
Attending: OBSTETRICS & GYNECOLOGY | Admitting: OBSTETRICS & GYNECOLOGY
Payer: COMMERCIAL

## 2022-11-21 ENCOUNTER — NON-APPOINTMENT (OUTPATIENT)
Age: 37
End: 2022-11-21

## 2022-11-21 ENCOUNTER — APPOINTMENT (OUTPATIENT)
Dept: ANTEPARTUM | Facility: CLINIC | Age: 37
End: 2022-11-21

## 2022-11-21 ENCOUNTER — ASOB RESULT (OUTPATIENT)
Age: 37
End: 2022-11-21

## 2022-11-21 VITALS — HEART RATE: 136 BPM | DIASTOLIC BLOOD PRESSURE: 57 MMHG | SYSTOLIC BLOOD PRESSURE: 115 MMHG

## 2022-11-21 DIAGNOSIS — Z3A.00 WEEKS OF GESTATION OF PREGNANCY NOT SPECIFIED: ICD-10-CM

## 2022-11-21 DIAGNOSIS — U07.1 COVID-19: ICD-10-CM

## 2022-11-21 DIAGNOSIS — O26.899 OTHER SPECIFIED PREGNANCY RELATED CONDITIONS, UNSPECIFIED TRIMESTER: ICD-10-CM

## 2022-11-21 DIAGNOSIS — Z90.49 ACQUIRED ABSENCE OF OTHER SPECIFIED PARTS OF DIGESTIVE TRACT: Chronic | ICD-10-CM

## 2022-11-21 LAB
ALBUMIN SERPL ELPH-MCNC: 3.2 G/DL — LOW (ref 3.3–5)
ALP SERPL-CCNC: 109 U/L — SIGNIFICANT CHANGE UP (ref 40–120)
ALT FLD-CCNC: 13 U/L — SIGNIFICANT CHANGE UP (ref 4–33)
AMNISURE ROM (RUPTURE OF MEMBRANES): NEGATIVE — SIGNIFICANT CHANGE UP
ANION GAP SERPL CALC-SCNC: 13 MMOL/L — SIGNIFICANT CHANGE UP (ref 7–14)
APPEARANCE UR: ABNORMAL
AST SERPL-CCNC: 15 U/L — SIGNIFICANT CHANGE UP (ref 4–32)
BACTERIA # UR AUTO: ABNORMAL
BASOPHILS # BLD AUTO: 0.03 K/UL — SIGNIFICANT CHANGE UP (ref 0–0.2)
BASOPHILS NFR BLD AUTO: 0.3 % — SIGNIFICANT CHANGE UP (ref 0–2)
BILIRUB SERPL-MCNC: 0.4 MG/DL — SIGNIFICANT CHANGE UP (ref 0.2–1.2)
BILIRUB UR-MCNC: NEGATIVE — SIGNIFICANT CHANGE UP
BUN SERPL-MCNC: 8 MG/DL — SIGNIFICANT CHANGE UP (ref 7–23)
CALCIUM SERPL-MCNC: 9.1 MG/DL — SIGNIFICANT CHANGE UP (ref 8.4–10.5)
CHLORIDE SERPL-SCNC: 103 MMOL/L — SIGNIFICANT CHANGE UP (ref 98–107)
CO2 SERPL-SCNC: 20 MMOL/L — LOW (ref 22–31)
COLOR SPEC: YELLOW — SIGNIFICANT CHANGE UP
COVID-19 SPIKE DOMAIN AB INTERP: POSITIVE
COVID-19 SPIKE DOMAIN ANTIBODY RESULT: >250 U/ML — HIGH
CREAT SERPL-MCNC: 0.57 MG/DL — SIGNIFICANT CHANGE UP (ref 0.5–1.3)
DIFF PNL FLD: NEGATIVE — SIGNIFICANT CHANGE UP
EGFR: 120 ML/MIN/1.73M2 — SIGNIFICANT CHANGE UP
EOSINOPHIL # BLD AUTO: 0.1 K/UL — SIGNIFICANT CHANGE UP (ref 0–0.5)
EOSINOPHIL NFR BLD AUTO: 0.9 % — SIGNIFICANT CHANGE UP (ref 0–6)
EPI CELLS # UR: 5 /HPF — SIGNIFICANT CHANGE UP (ref 0–5)
FLUAV AG NPH QL: SIGNIFICANT CHANGE UP
FLUBV AG NPH QL: SIGNIFICANT CHANGE UP
GLUCOSE BLDC GLUCOMTR-MCNC: 102 MG/DL — HIGH (ref 70–99)
GLUCOSE BLDC GLUCOMTR-MCNC: 107 MG/DL — HIGH (ref 70–99)
GLUCOSE BLDC GLUCOMTR-MCNC: 108 MG/DL — HIGH (ref 70–99)
GLUCOSE BLDC GLUCOMTR-MCNC: 117 MG/DL — HIGH (ref 70–99)
GLUCOSE BLDC GLUCOMTR-MCNC: 88 MG/DL — SIGNIFICANT CHANGE UP (ref 70–99)
GLUCOSE SERPL-MCNC: 112 MG/DL — HIGH (ref 70–99)
GLUCOSE UR QL: NEGATIVE — SIGNIFICANT CHANGE UP
HCT VFR BLD CALC: 36 % — SIGNIFICANT CHANGE UP (ref 34.5–45)
HGB BLD-MCNC: 12.2 G/DL — SIGNIFICANT CHANGE UP (ref 11.5–15.5)
HYALINE CASTS # UR AUTO: 0 /LPF — SIGNIFICANT CHANGE UP (ref 0–7)
IANC: 9.11 K/UL — HIGH (ref 1.8–7.4)
IMM GRANULOCYTES NFR BLD AUTO: 0.6 % — SIGNIFICANT CHANGE UP (ref 0–0.9)
KETONES UR-MCNC: ABNORMAL
LEUKOCYTE ESTERASE UR-ACNC: NEGATIVE — SIGNIFICANT CHANGE UP
LYMPHOCYTES # BLD AUTO: 1.51 K/UL — SIGNIFICANT CHANGE UP (ref 1–3.3)
LYMPHOCYTES # BLD AUTO: 12.9 % — LOW (ref 13–44)
MCHC RBC-ENTMCNC: 29.8 PG — SIGNIFICANT CHANGE UP (ref 27–34)
MCHC RBC-ENTMCNC: 33.9 GM/DL — SIGNIFICANT CHANGE UP (ref 32–36)
MCV RBC AUTO: 88 FL — SIGNIFICANT CHANGE UP (ref 80–100)
MONOCYTES # BLD AUTO: 0.9 K/UL — SIGNIFICANT CHANGE UP (ref 0–0.9)
MONOCYTES NFR BLD AUTO: 7.7 % — SIGNIFICANT CHANGE UP (ref 2–14)
NEUTROPHILS # BLD AUTO: 9.11 K/UL — HIGH (ref 1.8–7.4)
NEUTROPHILS NFR BLD AUTO: 77.6 % — HIGH (ref 43–77)
NITRITE UR-MCNC: NEGATIVE — SIGNIFICANT CHANGE UP
NRBC # BLD: 0 /100 WBCS — SIGNIFICANT CHANGE UP (ref 0–0)
NRBC # FLD: 0 K/UL — SIGNIFICANT CHANGE UP (ref 0–0)
PH UR: 6.5 — SIGNIFICANT CHANGE UP (ref 5–8)
PLATELET # BLD AUTO: 235 K/UL — SIGNIFICANT CHANGE UP (ref 150–400)
POTASSIUM SERPL-MCNC: 4.2 MMOL/L — SIGNIFICANT CHANGE UP (ref 3.5–5.3)
POTASSIUM SERPL-SCNC: 4.2 MMOL/L — SIGNIFICANT CHANGE UP (ref 3.5–5.3)
PROT SERPL-MCNC: 6.5 G/DL — SIGNIFICANT CHANGE UP (ref 6–8.3)
PROT UR-MCNC: ABNORMAL
RBC # BLD: 4.09 M/UL — SIGNIFICANT CHANGE UP (ref 3.8–5.2)
RBC # FLD: 13.2 % — SIGNIFICANT CHANGE UP (ref 10.3–14.5)
RBC CASTS # UR COMP ASSIST: 3 /HPF — SIGNIFICANT CHANGE UP (ref 0–4)
RH IG SCN BLD-IMP: POSITIVE — SIGNIFICANT CHANGE UP
RSV RNA NPH QL NAA+NON-PROBE: SIGNIFICANT CHANGE UP
SARS-COV-2 IGG+IGM SERPL QL IA: >250 U/ML — HIGH
SARS-COV-2 IGG+IGM SERPL QL IA: POSITIVE
SARS-COV-2 RNA SPEC QL NAA+PROBE: DETECTED
SODIUM SERPL-SCNC: 136 MMOL/L — SIGNIFICANT CHANGE UP (ref 135–145)
SP GR SPEC: 1.03 — SIGNIFICANT CHANGE UP (ref 1.01–1.05)
UROBILINOGEN FLD QL: SIGNIFICANT CHANGE UP
WBC # BLD: 11.72 K/UL — HIGH (ref 3.8–10.5)
WBC # FLD AUTO: 11.72 K/UL — HIGH (ref 3.8–10.5)
WBC UR QL: 2 /HPF — SIGNIFICANT CHANGE UP (ref 0–5)

## 2022-11-21 RX ORDER — FOLIC ACID 0.8 MG
1 TABLET ORAL DAILY
Refills: 0 | Status: DISCONTINUED | OUTPATIENT
Start: 2022-11-21 | End: 2022-11-22

## 2022-11-21 RX ORDER — SODIUM CHLORIDE 9 MG/ML
1000 INJECTION, SOLUTION INTRAVENOUS
Refills: 0 | Status: DISCONTINUED | OUTPATIENT
Start: 2022-11-21 | End: 2022-11-22

## 2022-11-21 RX ORDER — ACETAMINOPHEN 500 MG
1000 TABLET ORAL ONCE
Refills: 0 | Status: COMPLETED | OUTPATIENT
Start: 2022-11-21 | End: 2022-11-21

## 2022-11-21 RX ORDER — LORATADINE 10 MG/1
10 TABLET ORAL DAILY
Refills: 0 | Status: DISCONTINUED | OUTPATIENT
Start: 2022-11-21 | End: 2022-11-22

## 2022-11-21 RX ORDER — DEXTROSE 50 % IN WATER 50 %
15 SYRINGE (ML) INTRAVENOUS ONCE
Refills: 0 | Status: DISCONTINUED | OUTPATIENT
Start: 2022-11-21 | End: 2022-11-22

## 2022-11-21 RX ORDER — SODIUM CHLORIDE 9 MG/ML
1000 INJECTION INTRAMUSCULAR; INTRAVENOUS; SUBCUTANEOUS
Refills: 0 | Status: DISCONTINUED | OUTPATIENT
Start: 2022-11-21 | End: 2022-11-22

## 2022-11-21 RX ORDER — DEXTROSE 50 % IN WATER 50 %
12.5 SYRINGE (ML) INTRAVENOUS ONCE
Refills: 0 | Status: DISCONTINUED | OUTPATIENT
Start: 2022-11-21 | End: 2022-11-22

## 2022-11-21 RX ORDER — SODIUM CHLORIDE 9 MG/ML
1000 INJECTION, SOLUTION INTRAVENOUS
Refills: 0 | Status: DISCONTINUED | OUTPATIENT
Start: 2022-11-21 | End: 2022-11-21

## 2022-11-21 RX ORDER — SODIUM CHLORIDE 9 MG/ML
1000 INJECTION, SOLUTION INTRAVENOUS ONCE
Refills: 0 | Status: COMPLETED | OUTPATIENT
Start: 2022-11-21 | End: 2022-11-21

## 2022-11-21 RX ORDER — GLUCAGON INJECTION, SOLUTION 0.5 MG/.1ML
1 INJECTION, SOLUTION SUBCUTANEOUS ONCE
Refills: 0 | Status: DISCONTINUED | OUTPATIENT
Start: 2022-11-21 | End: 2022-11-22

## 2022-11-21 RX ORDER — LEVOTHYROXINE SODIUM 125 MCG
300 TABLET ORAL AT BEDTIME
Refills: 0 | Status: DISCONTINUED | OUTPATIENT
Start: 2022-11-21 | End: 2022-11-22

## 2022-11-21 RX ORDER — HEPARIN SODIUM 5000 [USP'U]/ML
5000 INJECTION INTRAVENOUS; SUBCUTANEOUS EVERY 12 HOURS
Refills: 0 | Status: DISCONTINUED | OUTPATIENT
Start: 2022-11-21 | End: 2022-11-22

## 2022-11-21 RX ORDER — FAMOTIDINE 10 MG/ML
20 INJECTION INTRAVENOUS DAILY
Refills: 0 | Status: DISCONTINUED | OUTPATIENT
Start: 2022-11-21 | End: 2022-11-22

## 2022-11-21 RX ORDER — DEXTROSE 50 % IN WATER 50 %
25 SYRINGE (ML) INTRAVENOUS ONCE
Refills: 0 | Status: DISCONTINUED | OUTPATIENT
Start: 2022-11-21 | End: 2022-11-22

## 2022-11-21 RX ORDER — BENZOCAINE AND MENTHOL 5; 1 G/100ML; G/100ML
1 LIQUID ORAL
Refills: 0 | Status: DISCONTINUED | OUTPATIENT
Start: 2022-11-21 | End: 2022-11-22

## 2022-11-21 RX ORDER — FERROUS SULFATE 325(65) MG
325 TABLET ORAL DAILY
Refills: 0 | Status: DISCONTINUED | OUTPATIENT
Start: 2022-11-21 | End: 2022-11-22

## 2022-11-21 RX ORDER — ONDANSETRON 8 MG/1
4 TABLET, FILM COATED ORAL EVERY 8 HOURS
Refills: 0 | Status: DISCONTINUED | OUTPATIENT
Start: 2022-11-21 | End: 2022-11-22

## 2022-11-21 RX ORDER — INSULIN LISPRO 100/ML
6 VIAL (ML) SUBCUTANEOUS
Refills: 0 | Status: DISCONTINUED | OUTPATIENT
Start: 2022-11-21 | End: 2022-11-22

## 2022-11-21 RX ADMIN — Medication 1 TABLET(S): at 15:05

## 2022-11-21 RX ADMIN — FAMOTIDINE 20 MILLIGRAM(S): 10 INJECTION INTRAVENOUS at 15:05

## 2022-11-21 RX ADMIN — ONDANSETRON 4 MILLIGRAM(S): 8 TABLET, FILM COATED ORAL at 15:05

## 2022-11-21 RX ADMIN — Medication 1000 MILLIGRAM(S): at 06:55

## 2022-11-21 RX ADMIN — Medication 325 MILLIGRAM(S): at 15:05

## 2022-11-21 RX ADMIN — Medication 6 UNIT(S): at 17:13

## 2022-11-21 RX ADMIN — Medication 6 UNIT(S): at 13:27

## 2022-11-21 RX ADMIN — HEPARIN SODIUM 5000 UNIT(S): 5000 INJECTION INTRAVENOUS; SUBCUTANEOUS at 23:13

## 2022-11-21 RX ADMIN — Medication 1 MILLIGRAM(S): at 15:05

## 2022-11-21 RX ADMIN — SODIUM CHLORIDE 2000 MILLILITER(S): 9 INJECTION, SOLUTION INTRAVENOUS at 06:57

## 2022-11-21 RX ADMIN — Medication 300 MICROGRAM(S): at 23:12

## 2022-11-21 NOTE — OB PROVIDER TRIAGE NOTE - HISTORY OF PRESENT ILLNESS
36 yo AMA obese  @ 36.4 wks c/o awake at 2am feeling abdominal pressure, possible trickle of fluid at 2am after she urinated, fever of 101, throat, body aches ha, poor appetite, + sick contacts all kids sick with fever at home- tested negative for covid. denies cp sob dysuria constipation. last saw OB Tuesday SVE 2.5cm, saw MFM 2 times last week with low CHACORTA of 4, pt went home had friend give IV fluid at home and repeat CHACORTA on . AP course complicated GDMA2 on insulin, obese, poor OB hx, 20+ week fetal anomaly demise, 11 miscarriages.    GBS: obtained in office  meds: Insulin 6 or 8 units with meals, PNV, ASA stopped at 36 wks, Synthroid 300mcg.  PMH: hypothyroidism  PSH:  cholecystectomy d&c x 2  gyn hx: denies  ob hx: 11 MAB with 2 d&c   2012 GDMA 38 wks 7#4   2018 GDMA 39 wks 6#9   2020 GDMA @ 36 wks 5#12  24 wk demise brain anomaly

## 2022-11-21 NOTE — CONSULT NOTE ADULT - SUBJECTIVE AND OBJECTIVE BOX
Maternal Fetal Medicine Consult Note    Subjective:     Objective:     T(C): 36.7 (22 @ 09:20), Max: 37.7 (22 @ 05:29)  HR: 109 (22 @ 09:46) (100 - 136)  BP: 116/68 (22 @ 09:19) (115/57 - 116/68)  RR: 16 (22 @ 05:50) (16 - 16)  SpO2: 97% (22 @ 09:31) (88% - 99%)  Exam:         Assessment:     Plan:     Lizbeth Steiner MD   M Fellow      COVID vaccinated and boosted (3 doses)  Body aches last night. Had fevers at home (T max 101.7F), feels like fever broke on arrival to hospital   Son also sick but tested negative   Lower back pain and pelvic pressure  Nausea (pregnancy baseline), no vomiting or diarrhea  Light cough no dyspnea  URI symptoms x1 week     GDMA2     History:   OB:  x3- 38, 39, 36 weeks (PTT 7lb4oz), sPTB 36 weeks; D&E x 24 weeks (brain anomaly), ectopic x1 (MTX), SAB x ~10 (APLS testing negative)   Gyn: Reg/5d, -fibroids/cysts  PMH: Hypothyroidism  PSH: D&E x1, D&C x1, CCY    Meds: levothyroxine 300 mcg, humalog 6-8 units with meals  NKDA  SH: No toxic habits    Maternal Fetal Medicine Consult Note **Incomplete**     HPI: The patient is a 36 yo P2-1-3 at ***     COVID vaccinated and boosted (3 doses)  Body aches last night. Had fevers at home (T max 101.7F), feels like fever broke on arrival to hospital   Son also sick but tested negative   Lower back pain and pelvic pressure  Nausea (pregnancy baseline), no vomiting or diarrhea  Light cough no dyspnea  URI symptoms x1 week     Pregnancy complicated by GDMA2.     Objective:     T(C): 36.7 (22 @ 09:20), Max: 37.7 (22 @ 05:29)  HR: 109 (22 @ 09:46) (100 - 136)  BP: 116/68 (22 @ 09:19) (115/57 - 116/68)  RR: 16 (22 @ 05:50) (16 - 16)  SpO2: 97% (22 @ 09:31) (88% - 99%)  Exam:         Assessment:     Plan:     Lizbeth Steiner MD   MF Fellow          History:   OB:  x3- 38, 39, 36 weeks (PTT 7lb4oz), sPTB 36 weeks; D&E x 24 weeks (brain anomaly), ectopic x1 (MTX), SAB x ~10 (APLS testing negative)   Gyn: Reg/5d, -fibroids/cysts  PMH: Hypothyroidism  PSH: D&E x1, D&C x1, CCY    Meds: levothyroxine 300 mcg, humalog 6-8 units with meals  NKDA  SH: No toxic habits    Maternal Fetal Medicine Consult Note     HPI: The patient is a 36 yo P2-1-3 at 36w4d dated by LMP 3/10/22 presenting to triage for viral symptoms found to have COVID positive PCR. MFM consulted for oligohydramnios with no 2x2 fluid pocket on bedside scan.     COVID vaccinated and boosted (3 doses)  Body aches last night. Had fevers at home (T max 101.7F), feels like fever broke on arrival to hospital   Son also sick but tested negative   Lower back pain and pelvic pressure  Nausea (pregnancy baseline), no vomiting or diarrhea  Light cough no dyspnea  URI symptoms x1 week   Patient denies any fluid leakage, vaginal bleeding, reports normal fetal movements.     Pregnancy complicated by GDMA2. Patient followed in fetal testing, was found to have subjectively low amniotic fluid with last CHACORTA 7.82cm on . Last EFW 1973g (22%) at 33 weeks.     History:   OB: -2-11-3  x3- 38, 39, 36 weeks (PTT 7lb4oz), sPTB 36 weeks; D&E x 22 weeks (brain anomaly), ectopic x1 (MTX), SAB x 11 (APLS testing negative)   Gyn: Reg/5d, -fibroids/cysts  PMH: Hypothyroidism  PSH: D&E x1, D&C x1, CCY 2011   Meds: levothyroxine 300 mcg, humalog 6-8 units with meals  NKDA  SH: No toxic habits       Objective:     T(C): 36.7 (22 @ 09:20), Max: 37.7 (22 @ 05:29)  HR: 109 (22 @ 09:46) (100 - 136)  BP: 116/68 (22 @ 09:19) (115/57 - 116/68)  RR: 16 (22 @ 05:50) (16 - 16)  SpO2: 97% (22 @ 09:31) (88% - 99%)    Exam:         Assessment:     Plan:     Lizbeth Steiner MD   [facility] Fellow           Maternal Fetal Medicine Consult Note     HPI: The patient is a 38 yo P2-1-3 at 36w4d dated by LMP 3/10/22 presenting to triage for viral symptoms found to have COVID positive PCR. MFM consulted for oligohydramnios with no 2x2 fluid pocket on bedside scan.     COVID vaccinated and boosted (3 doses)  Patient reports body aches starting last night followed by fevers at home (T max 101.7F), feels like fever broke on arrival to hospital. She has associated lower back pain and pelvic pressure.   Son also sick but tested negative   Nausea (pregnancy baseline), no vomiting or diarrhea  Light cough no dyspnea  URI symptoms x1 week   Patient denies any fluid leakage, vaginal bleeding, reports normal fetal movements.     Pregnancy complicated by GDMA2. Patient followed in fetal testing, was found to have subjectively low amniotic fluid with last CHACORTA 7.82cm on . Last EFW 1973g (22%) at 33 weeks.     History:   OB: -2-11-3  x3- 38, 39, 36 weeks (PTT 7lb4oz), sPTB 36 weeks; D&E x 22 weeks (brain anomaly), ectopic x1 (MTX), SAB x 11 (APLS testing negative)   Gyn: Reg/5d, -fibroids/cysts  PMH: Hypothyroidism  PSH: D&E x1, D&C x1, CCY 2011   Meds: levothyroxine 300 mcg, humalog 6-8 units with meals  NKDA  SH: No toxic habits     Objective:     T(C): 36.7 (22 @ 09:20), Max: 37.7 (22 @ 05:29)  HR: 109 (22 @ 09:46) (100 - 136)  BP: 116/68 (22 @ 09:19) (115/57 - 116/68)  RR: 16 (22 @ 05:50) (16 - 16)  SpO2: 97% (22 @ 09:31) (88% - 99%)    Exam:   Gen: NAD, well-appearing  Pulm: Normal WOB  Gyn (triage providers): no fluid pooling, cervical exam /-3     BSUS cephalic placenta anterior MVP 1.75cm, BPP 6/8 (-2 for fluid)     Assessment: She was ruled out for rupture of membranes in triage with negative symptoms exam and negative ferning/ nitrazine/ Amnisure.     Plan:   -Admit to antepartum for maternal and fetal monitoring in setting of maternal COVID infection   -ATU scan for repeat fluid check   -ID to see whether patient candidate for remdesivir vs. Paxlovid   -Plan for labor induction following maternal stabilization for oligohydramnios at 36 weeks. BID fetal monitoring while inpatient     NEERU Prabhakar Fellow  D/w Dr. Mukherjee            Maternal Fetal Medicine Consult Note     HPI: The patient is a 38 yo P2-1-3 at 36w4d dated by LMP 3/10/22 presenting to triage for viral symptoms found to have COVID positive PCR. MFM consulted for oligohydramnios with no 2x2 fluid pocket on triage provider bedside scan.     Patient reports body aches starting last night followed by fevers at home (T max 101.7F), feels like fever broke on arrival to hospital. She has associated lower back pain and pelvic pressure as well as URI symptoms x1 week. Her son is also sick with similar symptoms but tested negative for COVID. Patient has mild nausea (her pregnancy baseline), but no vomiting or diarrhea. Reports a light cough no dyspnea or chest pain. COVID vaccinated and boosted (3 doses). Patient denies any fluid leakage, vaginal bleeding, reports normal fetal movements. Pregnancy complicated by GDMA2. Patient followed in fetal testing, was found to have subjectively low amniotic fluid with last CHACORTA 7.82cm on . Last EFW 1973g (22%) at 33 weeks.     History:   OB: -2-11-3  x3- 38, 39, 36 weeks (PTT 7lb4oz), sPTB 36 weeks; D&E x 22 weeks (brain anomaly), ectopic x1 (MTX), SAB x 11 (APLS testing negative)   Gyn: Reg/5d, -fibroids/cysts  PMH: Hypothyroidism  PSH: D&E x1, D&C x1, CCY    Meds: levothyroxine 300 mcg, humalog 6-8 units with meals  NKDA  SH: No toxic habits     Objective:     T(C): 36.7 (22 @ 09:20), Max: 37.7 (22 @ 05:29)  HR: 109 (22 @ 09:46) (100 - 136)  BP: 116/68 (22 @ 09:19) (115/57 - 116/68)  RR: 16 (22 @ 05:50) (16 - 16)  SpO2: 97% (22 @ 09:31) (88% - 99%)    Exam:   Gen: NAD, well-appearing  Pulm: Normal WOB  Gyn (triage providers): no fluid pooling, cervical exam 2/50/-3     BSUS cephalic placenta anterior MVP 1.75cm, BPP 6/8 (-2 for fluid)     NST: 140 bpm, moderate variability, +accelerations, -decelerations   Irregular contractions on toco   Reactive NST

## 2022-11-21 NOTE — OB PROVIDER H&P - ATTENDING COMMENTS
Agree with above  P3 at 36w4d presented with fever  COVID PCR positive, pt without respiratory symptoms   Pt being followed for oligohydramnios, seen by MFM outpatient, CHACORTA 1.8 on admission sono  Pt admitted for IV hydration and supportive care  Fetal status reassuring   If pt continues to be febrile, will consider speaking to ID about remdesivir   For official ATU sono to assess CHACORTA    castillo HAHN

## 2022-11-21 NOTE — OB RN TRIAGE NOTE - CHIEF COMPLAINT QUOTE
lower back and abdominal pain and pressure. I had a fever 101.3 at 9pm last night with a cough, body aches, and a sore throat.

## 2022-11-21 NOTE — OB PROVIDER TRIAGE NOTE - NSHPPHYSICALEXAM_GEN_ALL_CORE
abd soft gravid obese NT no CVA tenderness bilat  CV RRR tachycardia up to 136  LS clear bilaterally  TAS:  vertex  anterior placenta  BPP 6/8 for CHACORTA  CHACORTA: 1.4  SVE: 2/50/-3 unchanges since last week exam  SSE: no pooling fluid negative nitrazine negative fern negative amnisure  FHT: moderate variability, + accelerations, occasional variable  toco: none  Vital Signs Last 24 Hrs  T(C): 37.7 (21 Nov 2022 05:50), Max: 37.7 (21 Nov 2022 05:29)  T(F): 99.9 (21 Nov 2022 05:50), Max: 99.9 (21 Nov 2022 05:50)  HR: 107 (21 Nov 2022 06:44) (106 - 136)  BP: 115/57 (21 Nov 2022 05:50) (115/57 - 115/57)  BP(mean): --  RR: 16 (21 Nov 2022 05:50) (16 - 16)  SpO2: 98% (21 Nov 2022 06:44) (97% - 98%)    Parameters below as of 21 Nov 2022 05:50  Patient On (Oxygen Delivery Method): room air

## 2022-11-21 NOTE — OB PROVIDER TRIAGE NOTE - NSRISKFACTORSDETA_OBGYN_ALL_OB
Gestational Diabetes/Previous  <37 weeks/Other serious chronic Disease/Other Poor Pregnancy Outcome/Referral for High Risk Care

## 2022-11-21 NOTE — OB PROVIDER TRIAGE NOTE - NSOBPROVIDERNOTE_OBGYN_ALL_OB_FT
NST BPP  pulse ox in place  Flu panel sent with RSV and covid  POCT fingerstick 108  labs CBC CMP ua  IVL 1 iter IV fluid  TYelnol for temp 37.7  amnisure negative- pt reported a slight trickle at 2am, with low CHACORTA last week    d/w Dr Craig results pending will reevaluate for plan   repeat CHACORTA after IV fluid  monitor temperature

## 2022-11-21 NOTE — OB PROVIDER H&P - ASSESSMENT
36 yo AMA obese  @ 36.4 wks admit to antepartum for maternal observation at this time for COVID positive  - NST, twice a day  - LR at 125 cc/hr  - admission consent obtained  - no Remdesivir at this time unless patient spikes a temperature  - s/p MFM consult in D&T  - above management of care discussed with  36 yo AMA obese  @ 36.4 wks admit to antepartum for maternal observation at this time for COVID positive  - NST, twice a day  - LR at 125 cc/hr, s/p LR bolus  - admission consent obtained  - ATU ultrasound by the bedside completed  - no Remdesivir at this time unless patient spikes a temperature  - s/p MFM consult in D&T  - above management of care discussed with

## 2022-11-21 NOTE — OB PROVIDER H&P - HISTORY OF PRESENT ILLNESS
38 yo AMA obese  @ 36.4 wks c/o awake at 2am feeling abdominal pressure, possible trickle of fluid at 2am after she urinated, fever of 101, throat, body aches ha, poor appetite, + sick contacts all kids sick with fever at home- tested negative for covid. denies cp sob dysuria constipation. last saw OB Tuesday SVE 2.5cm, saw MFM 2 times last week with low CHACORTA of 4, pt went home had friend give IV fluid at home and repeat CHACORTA on . AP course complicated GDMA2 on insulin, obese, poor OB hx, 20+ week fetal anomaly demise, 11 miscarriages.    GBS: obtained in office  meds: Insulin 6 or 8 units with meals, PNV, ASA stopped at 36 wks, Synthroid 300mcg.  PMH: hypothyroidism  PSH:  cholecystectomy d&c x 2  gyn hx: denies  ob hx: 11 MAB with 2 d&c   2012 GDMA 38 wks 7#4   2018 GDMA 39 wks 6#9   2020 GDMA @ 36 wks 5#12  24 wk demise brain anomaly  38 yo AMA obese  @ 36.4 wks c/o awake at 2am feeling abdominal pressure, possible trickle of fluid at 2am after she urinated, fever of 101, throat, body aches ha, poor appetite, + sick contacts all kids sick with fever at home- tested negative for covid. denies cp sob dysuria constipation. last saw OB Tuesday SVE 2.5cm, saw MFM 2 times last week with low CHACORTA of 4, pt went home had friend give IV fluid at home and repeat CHACORTA on Friday 6. AP course complicated GDMA2 on insulin, obese, poor OB hx, 20+ week fetal anomaly demise, 11 miscarriages.    COVID: reports of s/p vaccination x2 and booster x 1  GBS: obtained in office  meds: Insulin 6 or 8 units with meals, PNV, ASA stopped at 36 wks, Synthroid 300mcg.  PMH: hypothyroidism  PSH:  cholecystectomy d&c x 2  gyn hx: denies  ob hx: 11 MAB with 2 d&c   2012 GDMA 38 wks 7#4   2018 GDMA 39 wks 6#9   2020 GDMA @ 36 wks 5#12  24 wk demise brain anomaly

## 2022-11-21 NOTE — OB PROVIDER H&P - NSGESTATIONAGE1_OBGYN_ALL_OB_NU
"  Dear Clair Kulkarni,    Your symptoms show that you may have coronavirus (COVID-19). This illness can cause fever, cough and trouble breathing. Many people get a mild case and get better on their own. Some people can get very sick.    Will I be tested for COVID-19?  We would like to test you for this virus. I have placed an order for this test and please call [phone] to schedule testing. [employer] employees please call [phone].  [employer] employees call [phone].     When it's time for your COVID test:  Stay at least 6 feet away from others. (If someone will drive you to your test, stay in the backseat, as far away from the  as you can.)  Cover your mouth and nose with a mask, tissue or washcloth.  Go straight to the testing site. Don't make any stops on the way there or back.    Starting now:     Do not go to work. Follow your usual processes for taking time away from work.  o If you receive a negative COVID-19 test result and were NOT exposed to someone with a known positive COVID-19 test, you can return to work once you're free of fever for 24 hours without fever-reducing medication and your symptoms are improving or resolved.  o If you receive a positive COVID-19 test result, you must be cleared by Employee Occupational Health and Safety to return to work.   o If you were exposed to someone who has tested positive for COVID-19, you can return to work 14 days after your last contact with the positive individual, provided you do not have symptoms at all during that time. In some cases, your manager may ask you to come back sooner than 14 days.     During this time, don't leave the house except for testing or medical care.  o Stay in your own room, even for meals. Use your own bathroom if you can.  o Stay away from others in your home. No hugging, kissing or shaking hands. No visitors.  o Don't go to work, school or anywhere else.    Clean \"high touch\" surfaces often (doorknobs, " counters, handles, etc.). Use a household cleaning spray or wipes. You'll find a full list of  on the EPA website: www.epa.gov/pesticide-registration/list-n-disinfectants-use-against-sars-cov-2.    Cover your mouth and nose with a mask, tissue or washcloth to avoid spreading germs.    Wash your hands and face often. Use soap and water.    People in these groups are at risk for severe illness due to COVID-19:  o People 65 years and older  o People who live in a nursing home or long-term care facility  o People with chronic disease (lung, heart, cancer, diabetes, kidney, liver, immunologic)  o People who have a weakened immune system, including those who:  - Are in cancer treatment  - Take medicine that weakens the immune system, such as corticosteroids  - Had a bone marrow or organ transplant  - Have an immune deficiency  - Have poorly controlled HIV or AIDS  - Are obese (body mass index of 40 or higher)  - Smoke regularly      Caregivers should wear gloves while washing dishes, handling laundry and cleaning bedrooms and bathrooms.    Use caution when washing and drying laundry: Don't shake dirty laundry, and use the warmest water setting that you can.    For more tips, go to www.cdc.gov/coronavirus/2019-ncov/downloads/10Things.pdf.    Sign up for [employer]. We know it's scary to hear that you might have COVID-19. We want to track your symptoms to make sure you're okay over the next 2 weeks. Please look for an email from [employer]--this is a free, online program that we'll use to keep in touch. To sign up, follow the link in the email you will receive. Learn more at [email or web address]    How can I take care of myself?    Get lots of rest. Drink extra fluids (unless a doctor has told you not to)    Take Tylenol (acetaminophen) for fever or pain. If you have liver or kidney problems, ask your family doctor if it's okay to take Tylenol.  Adults can take either:    650 mg (two 325 mg pills)  every 4 to 6 hours, or     1,000 mg (two 500 mg pills) every 8 hours as needed.    Note: Don't take more than 3,000 mg in one day. Acetaminophen is found in many medicines (both prescribed and over-the-counter medicines). Read all labels to be sure you don't take too much.  For children, check the Tylenol bottle for the right dose. The dose is based on the child's age or weight.    If you have other health problems (like cancer, heart failure, an organ transplant or severe kidney disease): Call your specialty clinic if you don't feel better in the next 2 days.    Know when to call 911. Emergency warning signs include:  Trouble breathing or shortness of breath  Pain or pressure in the chest that doesn't go away  Feeling confused like you haven't felt before, or not being able to wake up  Bluish-colored lips or face    Where can I get more information?    [facility] - About COVID-19: [email or web address]  CDC - What to Do If You're Sick: www.cdc.gov/coronavirus/2019-ncov/about/steps-when-sick.html  November 9, 2020    RE:  Clair Kulkarni                                                                                                                                                       [address] [address] [address]        To whom it may concern:    I evaluated Clair Kulkarni on November 9, 2020. Clair Kulkarni should be excused from work/school.      Do not go to work. Follow your usual processes for taking time away from work.  o If you receive a negative COVID-19 test result and were NOT exposed to someone with a known positive COVID-19 test, you can return to work once you're free of fever for 24 hours without fever-reducing medication and your symptoms are improving or resolved.  o If you receive a positive COVID-19 test result, you must be cleared by Employee Occupational Health and Safety to return to work.   o If you were exposed to someone who has tested  positive for COVID-19, you can return to work 14 days after your last contact with the positive individual, provided you do not have symptoms at all during that time. In some cases, your manager may ask you to come back sooner than 14 days.     Sincerely,  Thee Fontanez MD         38

## 2022-11-21 NOTE — OB PROVIDER H&P - NS_GESTAGE_OBGYN_ALL_OB_FT
36w4d
Please follow up with your pediatrician in 1-2 days.  Please follow up with ENT. Please call to make an appointment.    Please call your doctor or return to the hospital for worsening headache, change in mental status, vomiting, worsening tinnitus, or any other concerns.    Your child has a concussion. To promote the best healing possible, there should be no sports or physical activity, no school work or homework, no school, no Ipad/tablet or video games or smart phones and reading until cleared by a neurologist, pediatrician, or [facility].     If her symptoms persist, you can make an appointment to see the physicians at [facility] or a pediatric neurologist. [facility] does not accept all insurances so you may want to discuss this when scheduling an appointment.    [facility]  Address: [facility] [address], [place] [address]  Phone: [phone]    Pediatric neurology: located at [address] [place] [address]. Please call the office to schedule an appointment, [phone]

## 2022-11-21 NOTE — CONSULT NOTE ADULT - ATTENDING COMMENTS
I agree with above.   Discussed on MFM Safety Rounds.   Repeat ultrasound today.     Talita Mukherjee MD

## 2022-11-21 NOTE — OB PROVIDER H&P - NSHPLABSRESULTS_GEN_ALL_CORE
amnisure negative                          12.2   11.72 )-----------( 235      ( 2022 06:51 )             36.0     11-    136  |  103  |  8   ----------------------------<  112<H>  4.2   |  20<L>  |  0.57    Ca    9.1      2022 06:51    TPro  6.5  /  Alb  3.2<L>  /  TBili  0.4  /  DBili  x   /  AST  15  /  ALT  13  /  AlkPhos  109        Urinalysis Basic - ( 2022 06:51 )    Color: Yellow / Appearance: Slightly Turbid / S.028 / pH: x  Gluc: x / Ketone: Moderate  / Bili: Negative / Urobili: <2 mg/dL   Blood: x / Protein: 30 mg/dL / Nitrite: Negative   Leuk Esterase: Negative / RBC: 3 /HPF / WBC 2 /HPF   Sq Epi: x / Non Sq Epi: 5 /HPF / Bacteria: Few    COVID: positive

## 2022-11-21 NOTE — OB PROVIDER H&P - PROBLEM SELECTOR PLAN 1
36 yo AMA obese  @ 36.4 wks admit to antepartum for maternal observation at this time for COVID positive  - NST, twice a day  - LR at 125 cc/hr  - admission consent obtained  - no Remdesivir at this time unless patient spikes a temperature  - s/p MFM consult in D&T  - above management of care discussed with  38 yo AMA obese  @ 36.4 wks admit to antepartum for maternal observation at this time for COVID positive  - NST, twice a day  - LR at 125 cc/hr, s/p LR bolus  - admission consent obtained  - ATU ultrasound by the bedside completed  - no Remdesivir at this time unless patient spikes a temperature  - s/p MFM consult in D&T  - above management of care discussed with

## 2022-11-21 NOTE — OB PROVIDER H&P - NSHPPHYSICALEXAM_GEN_ALL_CORE
Vital Signs Last 24 Hrs  T(C): 36.7 (21 Nov 2022 09:20), Max: 37.7 (21 Nov 2022 05:29)  T(F): 98.06 (21 Nov 2022 09:20), Max: 99.9 (21 Nov 2022 05:50)  HR: 109 (21 Nov 2022 09:46) (100 - 136)  BP: 116/68 (21 Nov 2022 09:19) (115/57 - 116/68)  BP(mean): --  RR: 16 (21 Nov 2022 05:50) (16 - 16)  SpO2: 98% (21 Nov 2022 09:46) (88% - 99%)    Parameters below as of 21 Nov 2022 05:50  Patient On (Oxygen Delivery Method): room air      D&T evaluation:  abd soft gravid obese NT no CVA tenderness bilat  CV RRR tachycardia up to 136  LS clear bilaterally    D&T ultrasound - TAS: vertex anterior placenta BPP 6/8 for CHACORTA: 1.4  ATU ultrasoound- TAS:   SVE: 2/50/-3 unchanges since last week exam  SSE: no pooling fluid negative nitrazine negative fern negative amnisure    FHT: 130 baseline, moderate variability, + accelerations  toco: none Vital Signs Last 24 Hrs  T(C): 36.7 (21 Nov 2022 09:20), Max: 37.7 (21 Nov 2022 05:29)  T(F): 98.06 (21 Nov 2022 09:20), Max: 99.9 (21 Nov 2022 05:50)  HR: 109 (21 Nov 2022 09:46) (100 - 136)  BP: 116/68 (21 Nov 2022 09:19) (115/57 - 116/68)  BP(mean): --  RR: 16 (21 Nov 2022 05:50) (16 - 16)  SpO2: 98% (21 Nov 2022 09:46) (88% - 99%)    Parameters below as of 21 Nov 2022 05:50  Patient On (Oxygen Delivery Method): room air      D&T evaluation:  abd soft gravid obese NT no CVA tenderness bilat  CV RRR tachycardia up to 136  LS clear bilaterally    D&T ultrasound - TAS: vertex anterior placenta BPP 6/8 for CHACORTA: 1.4  ATU ultrasoound- TAS: vertex, anterior, CHACORTA 6.03, 8/8 BPP  SVE: 2/50/-3 unchanges since last week exam  SSE: no pooling fluid negative nitrazine negative fern negative amnisure    FHT: 130 baseline, moderate variability, + accelerations  toco: none

## 2022-11-21 NOTE — OB RN TRIAGE NOTE - FALL HARM RISK - UNIVERSAL INTERVENTIONS
Bed in lowest position, wheels locked, appropriate side rails in place/Call bell, personal items and telephone in reach/Instruct patient to call for assistance before getting out of bed or chair/Non-slip footwear when patient is out of bed/[place] to call system/Physically safe environment - no spills, clutter or unnecessary equipment/Purposeful Proactive Rounding/Room/bathroom lighting operational, light cord in reach

## 2022-11-21 NOTE — OB RN PATIENT PROFILE - FALL HARM RISK - UNIVERSAL INTERVENTIONS
Bed in lowest position, wheels locked, appropriate side rails in place/Call bell, personal items and telephone in reach/Instruct patient to call for assistance before getting out of bed or chair/Non-slip footwear when patient is out of bed/[place] to call system/Physically safe environment - no spills, clutter or unnecessary equipment/Purposeful Proactive Rounding/Room/bathroom lighting operational, light cord in reach

## 2022-11-21 NOTE — OB RN TRIAGE NOTE - NSICDXPASTMEDICALHX_GEN_ALL_CORE_FT
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

## 2022-11-21 NOTE — CONSULT NOTE ADULT - ASSESSMENT
Assessment: 38 yo P3 at 36w4d presenting for viral symptoms found to have COVID with mild symptoms and incidental oligohydramnios. She was ruled out for rupture of membranes in triage with negative symptoms exam and negative ferning/ nitrazine/ Amnisure. Fetus is cephalic with fetal status reassuring.     Recommendations:   -Admit to antepartum for maternal and fetal monitoring in setting of maternal COVID infection and suspected oligohydramnios. IVF hydration  -ATU scan for repeat fluid check. Recommended delivery timing for oligohydramnios (no 2x2 fluid pocket) is 36-37w6d. Given patient's concurrent COVID infection, would defer delivery until infection has improved/resolved unless nonreassuring fetal status   -ID to see whether patient candidate for remdesivir vs. Paxlovid   -Continue home insulin regimen with fasting and postprandial FSG monitoring   -BID fetal monitoring while inpatient     NEERU Prabhakar Fellow  D/w Dr. Mukherjee  Assessment: 38 yo P3 at 36w4d presenting for viral symptoms found to have COVID with mild symptoms and incidental oligohydramnios. She was ruled out for rupture of membranes in triage with negative symptoms exam and negative ferning/ nitrazine/ Amnisure. Fetus is cephalic with fetal status reassuring.     Recommendations:   -Admit to antepartum for maternal and fetal monitoring in setting of maternal COVID infection and suspected oligohydramnios. IVF hydration  -ATU scan for repeat fluid check. Recommended delivery timing for oligohydramnios (no 2x2 fluid pocket) is 36-37w6d. Given patient's concurrent COVID infection, would defer delivery until infection has improved/resolved unless nonreassuring fetal status   -ID to see whether patient candidate for remdesivir vs. Paxlovid   -Continue home insulin regimen with fasting and postprandial FSG monitoring   -BID fetal monitoring while inpatient     ---------    Addendum:     CHACORTA 6cm on repeat sonographer scan. Continue inpatient management as above, repeat ultrasound for fluid check tomorrow.    NEERU Prabhakar Fellow  D/w Dr. Mukherjee  Assessment: 38 yo P3 at 36w4d presenting for viral symptoms found to have COVID with mild symptoms and incidental oligohydramnios. She was ruled out for rupture of membranes in triage with negative symptoms exam and negative ferning/ nitrazine/ Amnisure. Fetus is cephalic with fetal status reassuring. Patient has no respiratory symptoms apart from mild cough with clear lungs on triage provider exam, maternal tachycardia that is improving with IV hydration.     Recommendations:   -Admit to antepartum for maternal and fetal monitoring in setting of maternal COVID infection and suspected oligohydramnios. IVF hydration  -ATU scan for repeat fluid check. Recommended delivery timing for oligohydramnios (no 2x2 fluid pocket) is 36-37w6d. Given patient's concurrent COVID infection, would defer delivery until infection has improved/resolved unless nonreassuring fetal status   -ID to see whether patient candidate for remdesivir vs. Paxlovid   -Continue home insulin regimen with fasting and postprandial FSG monitoring   -BID fetal monitoring while inpatient     ---------    Addendum:     CHACORTA 6cm on repeat sonographer scan. Continue inpatient management as above, repeat ultrasound for fluid check tomorrow.    NEERU Prabhakar Fellow  D/w Dr. Mukherjee  Assessment: 36 yo P3 at 36w4d presenting for viral symptoms found to have COVID with mild symptoms and incidental oligohydramnios. She was ruled out for rupture of membranes in triage with negative symptoms exam and negative ferning/ nitrazine/ Amnisure. Fetus is cephalic with fetal status reassuring. Patient has no respiratory symptoms apart from mild cough with clear lungs on triage provider exam, maternal tachycardia that is improving with IV hydration.     Recommendations:   -Admit to antepartum for maternal and fetal monitoring in setting of maternal COVID infection and suspected oligohydramnios. IVF hydration  -ATU scan for repeat fluid check. Recommended delivery timing for oligohydramnios (no 2x2 fluid pocket) is 36-37w6d. Given patient's concurrent COVID infection, would defer delivery until infection has improved/resolved unless nonreassuring fetal status   -ID to see whether patient candidate for remdesivir vs. Paxlovid if patient symptomatic or febrile   -Continue home insulin regimen with fasting and postprandial FSG monitoring   -BID fetal monitoring while inpatient     ---------    Addendum:     CHACORTA 6cm on repeat sonographer scan. Continue inpatient management as above, repeat ultrasound for fluid check tomorrow.    NEERU Prabhakar Fellow  D/w Dr. Mukherjee

## 2022-11-21 NOTE — OB PROVIDER TRIAGE NOTE - NS_OBGYNHISTORY_OBGYN_ALL_OB_FT
gyn hx: denies  ob hx: 11 MAB with 2 d&c   2012 GDMA 38 wks 7#4   2018 GDMA 39 wks 6#9   2020 GDMA @ 36 wks 5#12  24 wk demise brain anomaly

## 2022-11-21 NOTE — OB RN TRIAGE NOTE - NS_PARA_OBGYN_ALL_OB_NU
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 PET/CT ordered, but within 2 days of having it done, she called with c/o hemoptysis, back pain, SOB. Went to ER where a CTA was read as possible large YUNI PNA, sent home with oral Ab. PET/CT then returned as concerning for a mass. SHe has completed Ab course without improvement in her symptoms.  Recent CA 19-9 is exponentially increasing. I reviewed scan with thoracic radiology as well as Dr. Poe. PLan for admission, bronch to determine if this is a new primary vs recurrence of ampullary ca. House onc will follow. 3

## 2022-11-22 ENCOUNTER — TRANSCRIPTION ENCOUNTER (OUTPATIENT)
Age: 37
End: 2022-11-22

## 2022-11-22 ENCOUNTER — APPOINTMENT (OUTPATIENT)
Dept: ANTEPARTUM | Facility: CLINIC | Age: 37
End: 2022-11-22

## 2022-11-22 ENCOUNTER — ASOB RESULT (OUTPATIENT)
Age: 37
End: 2022-11-22

## 2022-11-22 ENCOUNTER — APPOINTMENT (OUTPATIENT)
Dept: ANTEPARTUM | Facility: HOSPITAL | Age: 37
End: 2022-11-22

## 2022-11-22 VITALS
RESPIRATION RATE: 18 BRPM | OXYGEN SATURATION: 98 % | DIASTOLIC BLOOD PRESSURE: 70 MMHG | HEART RATE: 106 BPM | SYSTOLIC BLOOD PRESSURE: 121 MMHG | TEMPERATURE: 98 F

## 2022-11-22 DIAGNOSIS — Z04.9 ENCOUNTER FOR EXAMINATION AND OBSERVATION FOR UNSPECIFIED REASON: ICD-10-CM

## 2022-11-22 PROBLEM — O03.9 COMPLETE OR UNSPECIFIED SPONTANEOUS ABORTION WITHOUT COMPLICATION: Chronic | Status: ACTIVE | Noted: 2022-11-21

## 2022-11-22 PROBLEM — O00.90 UNSPECIFIED ECTOPIC PREGNANCY WITHOUT INTRAUTERINE PREGNANCY: Chronic | Status: ACTIVE | Noted: 2018-03-28

## 2022-11-22 LAB
ALBUMIN SERPL ELPH-MCNC: 3.1 G/DL — LOW (ref 3.3–5)
ALP SERPL-CCNC: 121 U/L — HIGH (ref 40–120)
ALT FLD-CCNC: 13 U/L — SIGNIFICANT CHANGE UP (ref 4–33)
ANION GAP SERPL CALC-SCNC: 15 MMOL/L — HIGH (ref 7–14)
AST SERPL-CCNC: 20 U/L — SIGNIFICANT CHANGE UP (ref 4–32)
BASOPHILS # BLD AUTO: 0.04 K/UL — SIGNIFICANT CHANGE UP (ref 0–0.2)
BASOPHILS NFR BLD AUTO: 0.3 % — SIGNIFICANT CHANGE UP (ref 0–2)
BILIRUB SERPL-MCNC: 0.4 MG/DL — SIGNIFICANT CHANGE UP (ref 0.2–1.2)
BLD GP AB SCN SERPL QL: NEGATIVE — SIGNIFICANT CHANGE UP
BUN SERPL-MCNC: 6 MG/DL — LOW (ref 7–23)
CALCIUM SERPL-MCNC: 9 MG/DL — SIGNIFICANT CHANGE UP (ref 8.4–10.5)
CHLORIDE SERPL-SCNC: 101 MMOL/L — SIGNIFICANT CHANGE UP (ref 98–107)
CO2 SERPL-SCNC: 19 MMOL/L — LOW (ref 22–31)
CREAT SERPL-MCNC: 0.57 MG/DL — SIGNIFICANT CHANGE UP (ref 0.5–1.3)
EGFR: 120 ML/MIN/1.73M2 — SIGNIFICANT CHANGE UP
EOSINOPHIL # BLD AUTO: 0.08 K/UL — SIGNIFICANT CHANGE UP (ref 0–0.5)
EOSINOPHIL NFR BLD AUTO: 0.7 % — SIGNIFICANT CHANGE UP (ref 0–6)
GLUCOSE BLDC GLUCOMTR-MCNC: 116 MG/DL — HIGH (ref 70–99)
GLUCOSE BLDC GLUCOMTR-MCNC: 97 MG/DL — SIGNIFICANT CHANGE UP (ref 70–99)
GLUCOSE SERPL-MCNC: 93 MG/DL — SIGNIFICANT CHANGE UP (ref 70–99)
HCT VFR BLD CALC: 35.4 % — SIGNIFICANT CHANGE UP (ref 34.5–45)
HGB BLD-MCNC: 12.1 G/DL — SIGNIFICANT CHANGE UP (ref 11.5–15.5)
IANC: 8.81 K/UL — HIGH (ref 1.8–7.4)
IMM GRANULOCYTES NFR BLD AUTO: 0.5 % — SIGNIFICANT CHANGE UP (ref 0–0.9)
LYMPHOCYTES # BLD AUTO: 17.3 % — SIGNIFICANT CHANGE UP (ref 13–44)
LYMPHOCYTES # BLD AUTO: 2.11 K/UL — SIGNIFICANT CHANGE UP (ref 1–3.3)
MCHC RBC-ENTMCNC: 30.3 PG — SIGNIFICANT CHANGE UP (ref 27–34)
MCHC RBC-ENTMCNC: 34.2 GM/DL — SIGNIFICANT CHANGE UP (ref 32–36)
MCV RBC AUTO: 88.7 FL — SIGNIFICANT CHANGE UP (ref 80–100)
MONOCYTES # BLD AUTO: 1.12 K/UL — HIGH (ref 0–0.9)
MONOCYTES NFR BLD AUTO: 9.2 % — SIGNIFICANT CHANGE UP (ref 2–14)
NEUTROPHILS # BLD AUTO: 8.81 K/UL — HIGH (ref 1.8–7.4)
NEUTROPHILS NFR BLD AUTO: 72 % — SIGNIFICANT CHANGE UP (ref 43–77)
NRBC # BLD: 0 /100 WBCS — SIGNIFICANT CHANGE UP (ref 0–0)
NRBC # FLD: 0 K/UL — SIGNIFICANT CHANGE UP (ref 0–0)
PLATELET # BLD AUTO: 212 K/UL — SIGNIFICANT CHANGE UP (ref 150–400)
POTASSIUM SERPL-MCNC: 4.2 MMOL/L — SIGNIFICANT CHANGE UP (ref 3.5–5.3)
POTASSIUM SERPL-SCNC: 4.2 MMOL/L — SIGNIFICANT CHANGE UP (ref 3.5–5.3)
PROT SERPL-MCNC: 6.7 G/DL — SIGNIFICANT CHANGE UP (ref 6–8.3)
RBC # BLD: 3.99 M/UL — SIGNIFICANT CHANGE UP (ref 3.8–5.2)
RBC # FLD: 13.4 % — SIGNIFICANT CHANGE UP (ref 10.3–14.5)
RH IG SCN BLD-IMP: POSITIVE — SIGNIFICANT CHANGE UP
SODIUM SERPL-SCNC: 135 MMOL/L — SIGNIFICANT CHANGE UP (ref 135–145)
T PALLIDUM AB TITR SER: NEGATIVE — SIGNIFICANT CHANGE UP
WBC # BLD: 12.22 K/UL — HIGH (ref 3.8–10.5)
WBC # FLD AUTO: 12.22 K/UL — HIGH (ref 3.8–10.5)

## 2022-11-22 PROCEDURE — 99232 SBSQ HOSP IP/OBS MODERATE 35: CPT | Mod: GC

## 2022-11-22 PROCEDURE — 76820 UMBILICAL ARTERY ECHO: CPT | Mod: 26

## 2022-11-22 PROCEDURE — 93010 ELECTROCARDIOGRAM REPORT: CPT

## 2022-11-22 PROCEDURE — 76819 FETAL BIOPHYS PROFIL W/O NST: CPT | Mod: 26

## 2022-11-22 RX ORDER — FAMOTIDINE 10 MG/ML
1 INJECTION INTRAVENOUS
Qty: 0 | Refills: 0 | DISCHARGE
Start: 2022-11-22

## 2022-11-22 RX ORDER — ACETAMINOPHEN 500 MG
975 TABLET ORAL ONCE
Refills: 0 | Status: COMPLETED | OUTPATIENT
Start: 2022-11-22 | End: 2022-11-22

## 2022-11-22 RX ORDER — LORATADINE 10 MG/1
1 TABLET ORAL
Qty: 0 | Refills: 0 | DISCHARGE
Start: 2022-11-22

## 2022-11-22 RX ORDER — FOLIC ACID 0.8 MG
1 TABLET ORAL
Qty: 0 | Refills: 0 | DISCHARGE
Start: 2022-11-22

## 2022-11-22 RX ORDER — LEVOTHYROXINE SODIUM 125 MCG
1 TABLET ORAL
Qty: 0 | Refills: 0 | DISCHARGE
Start: 2022-11-22

## 2022-11-22 RX ADMIN — Medication 975 MILLIGRAM(S): at 05:55

## 2022-11-22 RX ADMIN — HEPARIN SODIUM 5000 UNIT(S): 5000 INJECTION INTRAVENOUS; SUBCUTANEOUS at 11:00

## 2022-11-22 RX ADMIN — FAMOTIDINE 20 MILLIGRAM(S): 10 INJECTION INTRAVENOUS at 14:57

## 2022-11-22 RX ADMIN — Medication 325 MILLIGRAM(S): at 14:57

## 2022-11-22 RX ADMIN — Medication 1 MILLIGRAM(S): at 14:57

## 2022-11-22 RX ADMIN — LORATADINE 10 MILLIGRAM(S): 10 TABLET ORAL at 14:57

## 2022-11-22 RX ADMIN — Medication 975 MILLIGRAM(S): at 06:25

## 2022-11-22 RX ADMIN — Medication 1 TABLET(S): at 14:57

## 2022-11-22 RX ADMIN — Medication 6 UNIT(S): at 11:58

## 2022-11-22 NOTE — PROGRESS NOTE ADULT - SUBJECTIVE AND OBJECTIVE BOX
R3 Antepartum Note,       Patient seen and examined at bedside, no acute overnight events. No acute complaints. Pt reports +FM, denies LOF, VB, ctx, HA, epigastric pain, blurred vision, CP, SOB, N/V, fevers, and chills.    Vital Signs Last 24 Hours  T(C): 36.7 (11-22-22 @ 05:27), Max: 37.6 (11-21-22 @ 12:06)  HR: 106 (11-22-22 @ 05:27) (100 - 136)  BP: 121/70 (11-22-22 @ 05:27) (115/57 - 128/76)  RR: 18 (11-22-22 @ 05:27) (16 - 18)  SpO2: 98% (11-22-22 @ 05:27) (88% - 100%)    CAPILLARY BLOOD GLUCOSE      POCT Blood Glucose.: 117 mg/dL (21 Nov 2022 22:22)  POCT Blood Glucose.: 102 mg/dL (21 Nov 2022 18:29)  POCT Blood Glucose.: 107 mg/dL (21 Nov 2022 14:29)  POCT Blood Glucose.: 88 mg/dL (21 Nov 2022 12:08)      Physical Exam:  General: NAD  Abdomen: Soft, non-tender, gravid  Ext: No pain or swelling    Labs:             12.2   11.72 )-----------( 235      ( 11-21 @ 06:51 )             36.0     11-21 @ 06:51    136  |  103  |  8   ----------------------------<  112  4.2   |  20  |  0.57    Ca    9.1      11-21 @ 06:51    TPro  6.5  /  Alb  3.2  /  TBili  0.4  /  DBili  x   /  AST  15  /  ALT  13  /  AlkPhos  109  11-21 @ 06:51            MEDICATIONS  (STANDING):  benzocaine 15 mG/menthol 3.6 mG Lozenge 1 Lozenge Oral two times a day  dextrose 5%. 1000 milliLiter(s) (50 mL/Hr) IV Continuous <Continuous>  dextrose 5%. 1000 milliLiter(s) (100 mL/Hr) IV Continuous <Continuous>  dextrose 50% Injectable 25 Gram(s) IV Push once  dextrose 50% Injectable 12.5 Gram(s) IV Push once  dextrose 50% Injectable 25 Gram(s) IV Push once  famotidine    Tablet 20 milliGRAM(s) Oral daily  ferrous    sulfate 325 milliGRAM(s) Oral daily  folic acid 1 milliGRAM(s) Oral daily  glucagon  Injectable 1 milliGRAM(s) IntraMuscular once  heparin   Injectable 5000 Unit(s) SubCutaneous every 12 hours  insulin lispro Injectable (ADMELOG) 6 Unit(s) SubCutaneous three times a day before meals  levothyroxine 300 MICROGram(s) Oral at bedtime  loratadine 10 milliGRAM(s) Oral daily  prenatal multivitamin 1 Tablet(s) Oral daily  sodium chloride 0.9%. 1000 milliLiter(s) (125 mL/Hr) IV Continuous <Continuous>    MEDICATIONS  (PRN):  dextrose Oral Gel 15 Gram(s) Oral once PRN Blood Glucose LESS THAN 70 milliGRAM(s)/deciliter  ondansetron    Tablet 4 milliGRAM(s) Oral every 8 hours PRN Nausea and/or Vomiting   R3 Antepartum Note,       Patient seen and examined at bedside, no acute overnight events. Felt body aches, HA and chills overnight that improved with Tylenol. Pt reports +FM, denies LOF, VB, ctx, HA, epigastric pain, blurred vision, CP, SOB, N/V, fevers, and chills.    Vital Signs Last 24 Hours  T(C): 36.7 (11-22-22 @ 05:27), Max: 37.6 (11-21-22 @ 12:06)  HR: 106 (11-22-22 @ 05:27) (100 - 136)  BP: 121/70 (11-22-22 @ 05:27) (115/57 - 128/76)  RR: 18 (11-22-22 @ 05:27) (16 - 18)  SpO2: 98% (11-22-22 @ 05:27) (88% - 100%)    CAPILLARY BLOOD GLUCOSE      POCT Blood Glucose.: 117 mg/dL (21 Nov 2022 22:22)  POCT Blood Glucose.: 102 mg/dL (21 Nov 2022 18:29)  POCT Blood Glucose.: 107 mg/dL (21 Nov 2022 14:29)  POCT Blood Glucose.: 88 mg/dL (21 Nov 2022 12:08)      Physical Exam:  General: NAD  Abdomen: Soft, non-tender, gravid  Ext: No pain or swelling    Labs:             12.2   11.72 )-----------( 235      ( 11-21 @ 06:51 )             36.0     11-21 @ 06:51    136  |  103  |  8   ----------------------------<  112  4.2   |  20  |  0.57    Ca    9.1      11-21 @ 06:51    TPro  6.5  /  Alb  3.2  /  TBili  0.4  /  DBili  x   /  AST  15  /  ALT  13  /  AlkPhos  109  11-21 @ 06:51            MEDICATIONS  (STANDING):  benzocaine 15 mG/menthol 3.6 mG Lozenge 1 Lozenge Oral two times a day  dextrose 5%. 1000 milliLiter(s) (50 mL/Hr) IV Continuous <Continuous>  dextrose 5%. 1000 milliLiter(s) (100 mL/Hr) IV Continuous <Continuous>  dextrose 50% Injectable 25 Gram(s) IV Push once  dextrose 50% Injectable 12.5 Gram(s) IV Push once  dextrose 50% Injectable 25 Gram(s) IV Push once  famotidine    Tablet 20 milliGRAM(s) Oral daily  ferrous    sulfate 325 milliGRAM(s) Oral daily  folic acid 1 milliGRAM(s) Oral daily  glucagon  Injectable 1 milliGRAM(s) IntraMuscular once  heparin   Injectable 5000 Unit(s) SubCutaneous every 12 hours  insulin lispro Injectable (ADMELOG) 6 Unit(s) SubCutaneous three times a day before meals  levothyroxine 300 MICROGram(s) Oral at bedtime  loratadine 10 milliGRAM(s) Oral daily  prenatal multivitamin 1 Tablet(s) Oral daily  sodium chloride 0.9%. 1000 milliLiter(s) (125 mL/Hr) IV Continuous <Continuous>    MEDICATIONS  (PRN):  dextrose Oral Gel 15 Gram(s) Oral once PRN Blood Glucose LESS THAN 70 milliGRAM(s)/deciliter  ondansetron    Tablet 4 milliGRAM(s) Oral every 8 hours PRN Nausea and/or Vomiting

## 2022-11-22 NOTE — PROGRESS NOTE ADULT - ATTENDING COMMENTS
saw patient this afternoon and feeling better  will discharge with f/u next week for BPP and appointment with Dr anderson

## 2022-11-22 NOTE — DISCHARGE NOTE ANTEPARTUM - CARE PROVIDER_API CALL
Dacia Craig (MD)  Obstetrics and Gynecology  [facility] [place] [address]  Phone: [phone]  Fax: [phone]  Follow Up Time:

## 2022-11-22 NOTE — DISCHARGE NOTE ANTEPARTUM - NS MD DC FALL RISK RISK
For information on Fall & Injury Prevention, visit: [email or web address] OR  [email or web address] OR  https://www.cdc.gov/steadi/patient.html

## 2022-11-22 NOTE — DISCHARGE NOTE ANTEPARTUM - PATIENT PORTAL LINK FT
You can access the FollowMyHealth Patient Portal offered by [facility] by registering at the following website: [email or web address]. By joining [employer]’s FollowMyHealth portal, you will also be able to view your health information using other applications (apps) compatible with our system.

## 2022-11-22 NOTE — DISCHARGE NOTE ANTEPARTUM - MEDICATION SUMMARY - MEDICATIONS TO TAKE
I will START or STAY ON the medications listed below when I get home from the hospital:    acetaminophen 325 mg oral tablet  -- 3 tab(s) by mouth   -- Indication: For Fever/pain    loratadine 10 mg oral tablet  -- 1 tab(s) by mouth once a day  -- Indication: For Allergies    famotidine 20 mg oral tablet  -- 1 tab(s) by mouth once a day  -- Indication: For GERD    Prenatal Multivitamins with Folic Acid 1 mg oral tablet  -- 1 tab(s) by mouth once a day  -- Indication: For Pregnancy    levothyroxine 300 mcg (0.3 mg) oral tablet  -- 1 tab(s) by mouth once a day (at bedtime)  -- Indication: For Hypothyroidism    folic acid 1 mg oral tablet  -- 1 tab(s) by mouth once a day  -- Indication: For Pregnancy

## 2022-11-22 NOTE — DISCHARGE NOTE ANTEPARTUM - CARE PLAN
Principal Discharge DX:	2019 novel coronavirus disease (COVID-19)  Assessment and plan of treatment:	P1 @36w5d with CHACORTA of 6 and covid+. Plan to closely monitor. Follow up appointment with Dr. Craig next week and ATU scan next Tueday (11/29).    Call your OBGYN w/ any vaginal bleeding, contractions, leakage of fluid.  Call your OBGYN w/ any decreased fetal movement.  Follow up with your OBGYN within 1 week.   1

## 2022-11-22 NOTE — DISCHARGE NOTE ANTEPARTUM - PLAN OF CARE
P1 @36w5d with CHACORTA of 6 and covid+. Plan to closely monitor. Follow up appointment with Dr. Craig next week and ATU scan next Tueday (11/29).    Call your OBGYN w/ any vaginal bleeding, contractions, leakage of fluid.  Call your OBGYN w/ any decreased fetal movement.  Follow up with your OBGYN within 1 week.

## 2022-11-22 NOTE — DISCHARGE NOTE ANTEPARTUM - HOSPITAL COURSE
36yo ,2,11,3 admitted on  at 36w4d for maternal observation for borderline oligohydramnios (CHACORTA 6) and Covid+ (newly diagnosed). ATU sono was repeated on  and CHACORTA was consistent at 6. BPP 8/8. NST reactive. Being that CHACORTA has been stable, tracing reactive and BPP reassuring. Patient discharged on 22 with close follow up with OB next week. ATU sono scheduled for 22. Patient sent home without any complaints. Patient has been afebrile while inpatient despite Covid+ status.

## 2022-11-22 NOTE — PROGRESS NOTE ADULT - ASSESSMENT
38 yo P3 @ 36w5d admitted with COVID with mild symptoms and incidental oligohydramnios. She was ruled out for rupture of membranes in triage with negative symptoms exam and negative ferning/ nitrazine/ Amnisure. Fetus is cephalic with fetal status reassuring. Patient with mild cough and tachycardia, otherwise asymptomatic with nl BPs, saturating well on RA.    #COVID  - continue to monitor for fevers  - continue LR@125  - f/u AM CBC    #oligo  - resolved on official ATU sono    #A2  - Continue home insulin   - Pre and pot meal POCT    #Fetal wellbeing  - NT q12h    #Maternal wellbeing  - Regular diet  - HSQ/SCDs for DVT ppx  - PNV/Iron  - home Claratin, levothyroxine     Yessenia Byrd, PGY-3

## 2022-11-29 ENCOUNTER — APPOINTMENT (OUTPATIENT)
Dept: ANTEPARTUM | Facility: CLINIC | Age: 37
End: 2022-11-29
Payer: COMMERCIAL

## 2022-11-29 ENCOUNTER — ASOB RESULT (OUTPATIENT)
Age: 37
End: 2022-11-29

## 2022-11-29 PROCEDURE — 76818 FETAL BIOPHYS PROFILE W/NST: CPT

## 2022-11-29 PROCEDURE — ZZZZZ: CPT

## 2022-11-29 PROCEDURE — 76820 UMBILICAL ARTERY ECHO: CPT

## 2022-11-29 PROCEDURE — 76816 OB US FOLLOW-UP PER FETUS: CPT

## 2022-12-01 ENCOUNTER — NON-APPOINTMENT (OUTPATIENT)
Age: 37
End: 2022-12-01

## 2022-12-01 ENCOUNTER — APPOINTMENT (OUTPATIENT)
Dept: OBGYN | Facility: CLINIC | Age: 37
End: 2022-12-01

## 2022-12-01 ENCOUNTER — APPOINTMENT (OUTPATIENT)
Dept: MATERNAL FETAL MEDICINE | Facility: CLINIC | Age: 37
End: 2022-12-01

## 2022-12-01 ENCOUNTER — INPATIENT (INPATIENT)
Facility: HOSPITAL | Age: 37
LOS: 1 days | Discharge: HOME | End: 2022-12-03
Attending: OBSTETRICS & GYNECOLOGY | Admitting: OBSTETRICS & GYNECOLOGY

## 2022-12-01 ENCOUNTER — TRANSCRIPTION ENCOUNTER (OUTPATIENT)
Age: 37
End: 2022-12-01

## 2022-12-01 VITALS
RESPIRATION RATE: 16 BRPM | HEART RATE: 76 BPM | DIASTOLIC BLOOD PRESSURE: 70 MMHG | TEMPERATURE: 98 F | SYSTOLIC BLOOD PRESSURE: 135 MMHG

## 2022-12-01 VITALS
HEIGHT: 63 IN | WEIGHT: 240 LBS | DIASTOLIC BLOOD PRESSURE: 81 MMHG | BODY MASS INDEX: 42.52 KG/M2 | SYSTOLIC BLOOD PRESSURE: 120 MMHG

## 2022-12-01 DIAGNOSIS — J01.90 ACUTE SINUSITIS, UNSPECIFIED: ICD-10-CM

## 2022-12-01 DIAGNOSIS — Z90.49 ACQUIRED ABSENCE OF OTHER SPECIFIED PARTS OF DIGESTIVE TRACT: Chronic | ICD-10-CM

## 2022-12-01 DIAGNOSIS — Z3A.00 WEEKS OF GESTATION OF PREGNANCY NOT SPECIFIED: ICD-10-CM

## 2022-12-01 DIAGNOSIS — O26.899 OTHER SPECIFIED PREGNANCY RELATED CONDITIONS, UNSPECIFIED TRIMESTER: ICD-10-CM

## 2022-12-01 LAB
BASOPHILS # BLD AUTO: 0.05 K/UL — SIGNIFICANT CHANGE UP (ref 0–0.2)
BASOPHILS NFR BLD AUTO: 0.4 % — SIGNIFICANT CHANGE UP (ref 0–2)
BLD GP AB SCN SERPL QL: NEGATIVE — SIGNIFICANT CHANGE UP
EOSINOPHIL # BLD AUTO: 0.1 K/UL — SIGNIFICANT CHANGE UP (ref 0–0.5)
EOSINOPHIL NFR BLD AUTO: 0.8 % — SIGNIFICANT CHANGE UP (ref 0–6)
GLUCOSE BLDC GLUCOMTR-MCNC: 100 MG/DL — HIGH (ref 70–99)
HCT VFR BLD CALC: 37 % — SIGNIFICANT CHANGE UP (ref 34.5–45)
HGB BLD-MCNC: 12.4 G/DL — SIGNIFICANT CHANGE UP (ref 11.5–15.5)
IANC: 7.9 K/UL — HIGH (ref 1.8–7.4)
IMM GRANULOCYTES NFR BLD AUTO: 0.5 % — SIGNIFICANT CHANGE UP (ref 0–0.9)
LYMPHOCYTES # BLD AUTO: 27.8 % — SIGNIFICANT CHANGE UP (ref 13–44)
LYMPHOCYTES # BLD AUTO: 3.48 K/UL — HIGH (ref 1–3.3)
MCHC RBC-ENTMCNC: 29.7 PG — SIGNIFICANT CHANGE UP (ref 27–34)
MCHC RBC-ENTMCNC: 33.5 GM/DL — SIGNIFICANT CHANGE UP (ref 32–36)
MCV RBC AUTO: 88.5 FL — SIGNIFICANT CHANGE UP (ref 80–100)
MONOCYTES # BLD AUTO: 0.95 K/UL — HIGH (ref 0–0.9)
MONOCYTES NFR BLD AUTO: 7.6 % — SIGNIFICANT CHANGE UP (ref 2–14)
NEUTROPHILS # BLD AUTO: 7.9 K/UL — HIGH (ref 1.8–7.4)
NEUTROPHILS NFR BLD AUTO: 62.9 % — SIGNIFICANT CHANGE UP (ref 43–77)
NRBC # BLD: 0 /100 WBCS — SIGNIFICANT CHANGE UP (ref 0–0)
NRBC # FLD: 0 K/UL — SIGNIFICANT CHANGE UP (ref 0–0)
PLATELET # BLD AUTO: 393 K/UL — SIGNIFICANT CHANGE UP (ref 150–400)
RBC # BLD: 4.18 M/UL — SIGNIFICANT CHANGE UP (ref 3.8–5.2)
RBC # FLD: 12.7 % — SIGNIFICANT CHANGE UP (ref 10.3–14.5)
RH IG SCN BLD-IMP: POSITIVE — SIGNIFICANT CHANGE UP
WBC # BLD: 12.54 K/UL — HIGH (ref 3.8–10.5)
WBC # FLD AUTO: 12.54 K/UL — HIGH (ref 3.8–10.5)

## 2022-12-01 PROCEDURE — 59400 OBSTETRICAL CARE: CPT | Mod: U7,UB,GC

## 2022-12-01 RX ORDER — LANOLIN
1 OINTMENT (GRAM) TOPICAL EVERY 6 HOURS
Refills: 0 | Status: DISCONTINUED | OUTPATIENT
Start: 2022-12-01 | End: 2022-12-03

## 2022-12-01 RX ORDER — OXYTOCIN 10 UNIT/ML
333.33 VIAL (ML) INJECTION
Qty: 20 | Refills: 0 | Status: DISCONTINUED | OUTPATIENT
Start: 2022-12-01 | End: 2022-12-02

## 2022-12-01 RX ORDER — ACETAMINOPHEN 500 MG
975 TABLET ORAL
Refills: 0 | Status: DISCONTINUED | OUTPATIENT
Start: 2022-12-01 | End: 2022-12-03

## 2022-12-01 RX ORDER — DIPHENHYDRAMINE HCL 50 MG
25 CAPSULE ORAL EVERY 6 HOURS
Refills: 0 | Status: DISCONTINUED | OUTPATIENT
Start: 2022-12-01 | End: 2022-12-03

## 2022-12-01 RX ORDER — INFLUENZA VIRUS VACCINE 15; 15; 15; 15 UG/.5ML; UG/.5ML; UG/.5ML; UG/.5ML
0.5 SUSPENSION INTRAMUSCULAR ONCE
Refills: 0 | Status: DISCONTINUED | OUTPATIENT
Start: 2022-12-01 | End: 2022-12-01

## 2022-12-01 RX ORDER — DIBUCAINE 1 %
1 OINTMENT (GRAM) RECTAL EVERY 6 HOURS
Refills: 0 | Status: DISCONTINUED | OUTPATIENT
Start: 2022-12-01 | End: 2022-12-03

## 2022-12-01 RX ORDER — IBUPROFEN 200 MG
1 TABLET ORAL
Qty: 0 | Refills: 0 | DISCHARGE
Start: 2022-12-01

## 2022-12-01 RX ORDER — IBUPROFEN 200 MG
600 TABLET ORAL EVERY 6 HOURS
Refills: 0 | Status: COMPLETED | OUTPATIENT
Start: 2022-12-01 | End: 2023-10-30

## 2022-12-01 RX ORDER — SIMETHICONE 80 MG/1
80 TABLET, CHEWABLE ORAL EVERY 4 HOURS
Refills: 0 | Status: DISCONTINUED | OUTPATIENT
Start: 2022-12-01 | End: 2022-12-03

## 2022-12-01 RX ORDER — TETANUS TOXOID, REDUCED DIPHTHERIA TOXOID AND ACELLULAR PERTUSSIS VACCINE, ADSORBED 5; 2.5; 8; 8; 2.5 [IU]/.5ML; [IU]/.5ML; UG/.5ML; UG/.5ML; UG/.5ML
0.5 SUSPENSION INTRAMUSCULAR ONCE
Refills: 0 | Status: DISCONTINUED | OUTPATIENT
Start: 2022-12-01 | End: 2022-12-03

## 2022-12-01 RX ORDER — KETOROLAC TROMETHAMINE 30 MG/ML
30 SYRINGE (ML) INJECTION ONCE
Refills: 0 | Status: DISCONTINUED | OUTPATIENT
Start: 2022-12-01 | End: 2022-12-01

## 2022-12-01 RX ORDER — SODIUM CHLORIDE 9 MG/ML
1000 INJECTION INTRAMUSCULAR; INTRAVENOUS; SUBCUTANEOUS
Refills: 0 | Status: DISCONTINUED | OUTPATIENT
Start: 2022-12-01 | End: 2022-12-01

## 2022-12-01 RX ORDER — OXYTOCIN 10 UNIT/ML
333.33 VIAL (ML) INJECTION
Qty: 20 | Refills: 0 | Status: DISCONTINUED | OUTPATIENT
Start: 2022-12-01 | End: 2022-12-01

## 2022-12-01 RX ORDER — OXYCODONE HYDROCHLORIDE 5 MG/1
5 TABLET ORAL ONCE
Refills: 0 | Status: DISCONTINUED | OUTPATIENT
Start: 2022-12-01 | End: 2022-12-03

## 2022-12-01 RX ORDER — SODIUM CHLORIDE 9 MG/ML
3 INJECTION INTRAMUSCULAR; INTRAVENOUS; SUBCUTANEOUS EVERY 8 HOURS
Refills: 0 | Status: DISCONTINUED | OUTPATIENT
Start: 2022-12-01 | End: 2022-12-02

## 2022-12-01 RX ORDER — OXYCODONE HYDROCHLORIDE 5 MG/1
5 TABLET ORAL
Refills: 0 | Status: DISCONTINUED | OUTPATIENT
Start: 2022-12-01 | End: 2022-12-03

## 2022-12-01 RX ORDER — SODIUM CHLORIDE 9 MG/ML
1000 INJECTION, SOLUTION INTRAVENOUS
Refills: 0 | Status: DISCONTINUED | OUTPATIENT
Start: 2022-12-01 | End: 2022-12-01

## 2022-12-01 RX ORDER — PRAMOXINE HYDROCHLORIDE 150 MG/15G
1 AEROSOL, FOAM RECTAL EVERY 4 HOURS
Refills: 0 | Status: DISCONTINUED | OUTPATIENT
Start: 2022-12-01 | End: 2022-12-03

## 2022-12-01 RX ORDER — CITRIC ACID/SODIUM CITRATE 300-500 MG
15 SOLUTION, ORAL ORAL EVERY 6 HOURS
Refills: 0 | Status: DISCONTINUED | OUTPATIENT
Start: 2022-12-01 | End: 2022-12-01

## 2022-12-01 RX ORDER — CHLORHEXIDINE GLUCONATE 213 G/1000ML
1 SOLUTION TOPICAL ONCE
Refills: 0 | Status: DISCONTINUED | OUTPATIENT
Start: 2022-12-01 | End: 2022-12-01

## 2022-12-01 RX ORDER — AER TRAVELER 0.5 G/1
1 SOLUTION RECTAL; TOPICAL EVERY 4 HOURS
Refills: 0 | Status: DISCONTINUED | OUTPATIENT
Start: 2022-12-01 | End: 2022-12-03

## 2022-12-01 RX ORDER — MAGNESIUM HYDROXIDE 400 MG/1
30 TABLET, CHEWABLE ORAL
Refills: 0 | Status: DISCONTINUED | OUTPATIENT
Start: 2022-12-01 | End: 2022-12-03

## 2022-12-01 RX ORDER — BENZOCAINE 10 %
1 GEL (GRAM) MUCOUS MEMBRANE EVERY 6 HOURS
Refills: 0 | Status: DISCONTINUED | OUTPATIENT
Start: 2022-12-01 | End: 2022-12-03

## 2022-12-01 RX ORDER — HYDROCORTISONE 1 %
1 OINTMENT (GRAM) TOPICAL EVERY 6 HOURS
Refills: 0 | Status: DISCONTINUED | OUTPATIENT
Start: 2022-12-01 | End: 2022-12-03

## 2022-12-01 RX ADMIN — Medication 30 MILLIGRAM(S): at 21:39

## 2022-12-01 RX ADMIN — Medication 1000 MILLIUNIT(S)/MIN: at 22:26

## 2022-12-01 RX ADMIN — Medication 975 MILLIGRAM(S): at 22:30

## 2022-12-01 NOTE — DISCHARGE NOTE OB - NS MD DC FALL RISK RISK
For information on Fall & Injury Prevention, visit: [email or web address] OR  [email or web address] OR  https://www.cdc.gov/steadi/patient.html

## 2022-12-01 NOTE — DISCHARGE NOTE OB - MEDICATION SUMMARY - MEDICATIONS TO STOP TAKING
I will STOP taking the medications listed below when I get home from the hospital:    loratadine 10 mg oral tablet  -- 1 tab(s) by mouth once a day    famotidine 20 mg oral tablet  -- 1 tab(s) by mouth once a day    folic acid 1 mg oral tablet  -- 1 tab(s) by mouth once a day

## 2022-12-01 NOTE — DISCHARGE NOTE OB - MEDICATION SUMMARY - MEDICATIONS TO TAKE
I will START or STAY ON the medications listed below when I get home from the hospital:    ibuprofen 600 mg oral tablet  -- 1 tab(s) by mouth every 6 hours  -- Indication: For Encounter for full-term uncomplicated delivery

## 2022-12-01 NOTE — OB PROVIDER H&P - PRETERM DELIVERIES, OB PROFILE
1 Topical Sulfur Applications Counseling: Topical Sulfur Counseling: Patient counseled that this medication may cause skin irritation or allergic reactions.  In the event of skin irritation, the patient was advised to reduce the amount of the drug applied or use it less frequently.   The patient verbalized understanding of the proper use and possible adverse effects of topical sulfur application.  All of the patient's questions and concerns were addressed.

## 2022-12-01 NOTE — OB PROVIDER H&P - NSHPPHYSICALEXAM_GEN_ALL_CORE
VS  T(C): 36.7 (12-01-22 @ 20:02)  HR: 76 (12-01-22 @ 20:09)  BP: 135/70 (12-01-22 @ 20:02)  RR: 16 (12-01-22 @ 20:02)  SpO2: --    Gen: NAD, well-appearing, AAOx3   Abd: Soft, gravid  Ext: non-tender, non-edematous  SSE:   SVE:    Bedside sono:  FHT:  [place]:     A/P:   -Admit to L&D  -Consent  -Admission labs  -NPO, except ice chips   -IV fluids  -Labor: Intact/*ROM. Latent/Active labor. Fredi *.   -Fetus: Cat I tracing. Continuous toco and fetal monitoring.   -GBS: Negative, no GBS ppx required   -Analgesia:     Discussed with Dr. YAMEL Garcia PGY1 VS  T(C): 36.7 (12-01-22 @ 20:02)  HR: 76 (12-01-22 @ 20:09)  BP: 135/70 (12-01-22 @ 20:02)  RR: 16 (12-01-22 @ 20:02)  SpO2: --    Gen: NAD, well-appearing, AAOx3   Abd: Soft, gravid  Ext: non-tender, non-edematous  SVE:  5.5/90/-1  Bedside sono: vertex  FHT: 130bpm, mod variability, +accels, no decels  [place]: contractions not recorded on toco

## 2022-12-01 NOTE — OB PROVIDER H&P - HISTORY OF PRESENT ILLNESS
37y -1-11-3 at 38wGA who presents to L&D with painful contractions since 5pm today q3-5. Patient denies leakage of fluid. Endorses bloody show this afternoon.  She endorses good fetal movement. No other complaints at this time.     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

## 2022-12-01 NOTE — DISCHARGE NOTE OB - BREAST MILK IS MORE DIGESTIBLE, MAKING VOMITING, DIARRHEA, GAS AND CONSTIPATION LESS COMMON
CC:  Ashley Manning is here today for a 6 month med f/u.    Medications: medications verified and updated  Refills needed today?  NO  Denies known Latex allergy or symptoms of Latex sensitivity.  Patient would like communication of their results via:    Cell Phone:   Telephone Information:   Mobile [phone]     Okay to leave a message containing results? Yes  Tobacco history: verified    Health Maintenance Due   Topic Date Due   • Pneumococcal Vaccine 0-64 (1 - PCV) Never done   • COVID-19 Vaccine (3 - Booster for Pfizer series) 12/05/2021     Patient is due for topics as listed above but is not proceeding with them. [facility] status addressed. Patient Active.          Statement Selected

## 2022-12-01 NOTE — OB RN DELIVERY SUMMARY - NS_SEPSISRSKCALC_OBGYN_ALL_OB_FT
EOS calculated successfully. EOS Risk Factor: 0.5/1000 live births ([facility] national incidence); GA=38w;Temp=98.1; ROM=0; GBS='Negative'; Antibiotics='No antibiotics or any antibiotics < 2 hrs prior to birth'

## 2022-12-01 NOTE — OB PROVIDER H&P - ASSESSMENT
A/P: -1-11-3 at 38wGA with hypothyroidism who presents to L&D in labor at term, PNC complicated by oligohydramnios, GDMA2.   -Admit to L&D  -Consents signed  -Admission labs  -NPO, except ice chips   -Rotating IV fluids  -Expectant management   -Fetus: Cat I tracing. Continuous toco and fetal monitoring.   -GBS: Negative, no GBS ppx required   -Analgesia: Epidural PRN  -2u PRBC on hold for multiple gestation    Discussed with Dr. Makayla Garcia PGY1

## 2022-12-01 NOTE — OB PROVIDER H&P - NSICDXPASTMEDICALHX_GEN_ALL_CORE_FT
PAST MEDICAL HISTORY:  Adult Hypothyroidism gestational    Biliary Colic 8th episode    Ectopic pregnancy d&c    Miscarriage x10 no d&c    Vaginal delivery   FT   2018 FT    36 weeks 2020  24 week termination d/t brain anomaly- vaginal delivery with d&c     PAST MEDICAL HISTORY:  Adult Hypothyroidism gestational    Biliary Colic 8th episode    Ectopic pregnancy d&c    Miscarriage x10 no d&c    Vaginal delivery   FT   2018 FT    36 weeks 2020  22 week termination d/t brain anomaly- d&e

## 2022-12-01 NOTE — OB PROVIDER DELIVERY SUMMARY - NSSELHIDDEN_OBGYN_ALL_OB_FT
[NS_DeliveryAttending1_OBGYN_ALL_OB_FT:[id number]],[NS_DeliveryAssist1_OBGYN_ALL_OB_FT:[id number]],[NS_DeliveryRN_OBGYN_ALL_OB_FT:[id number]],[NS_CirculateRN2_OBGYN_ALL_OB_FT:[id number]]

## 2022-12-01 NOTE — OB RN DELIVERY SUMMARY - NSSELHIDDEN_OBGYN_ALL_OB_FT
[NS_DeliveryAttending1_OBGYN_ALL_OB_FT:[id number]],[NS_DeliveryAssist1_OBGYN_ALL_OB_FT:[id number]],[NS_DeliveryRN_OBGYN_ALL_OB_FT:[id number]],[NS_CirculateRN2_OBGYN_ALL_OB_FT:[id number]]

## 2022-12-01 NOTE — OB PROVIDER H&P - NS_OBGYNHISTORY_OBGYN_ALL_OB_FT
-10 MAB, reports no d&c  -1 ectopic pregnancy - methotrexate  -: D&E at 22w due to fetal brain anomaly  -2012: [facility]  c/b GDMA2 - 7#4  -2018: [facility]  c/b GDMA2 - 6#9  -2020:   c/b GDMA2 - 5#12

## 2022-12-01 NOTE — OB RN PATIENT PROFILE - FALL HARM RISK - UNIVERSAL INTERVENTIONS
Bed in lowest position, wheels locked, appropriate side rails in place/Call bell, personal items and telephone in reach/Instruct patient to call for assistance before getting out of bed or chair/Non-slip footwear when patient is out of bed/[place] to call system/Physically safe environment - no spills, clutter or unnecessary equipment/Purposeful Proactive Rounding/Room/bathroom lighting operational, light cord in reach

## 2022-12-01 NOTE — DISCHARGE NOTE OB - HOSPITAL COURSE
multipara at term presents in active labor, progresses quickly  and delivered via  on 22 a live female, apgars 9/9 6lbs. 8oz. placenta delivered spontaneously intact , second degree perineal laceration repaired, QBL 114cc.   pt did well and was discharged home in stable condition.

## 2022-12-01 NOTE — DISCHARGE NOTE OB - CARE PROVIDER_API CALL
Dacia Craig (MD)  Obstetrics and Gynecology  [facility] [place] [address]  Phone: [phone]  Fax: [phone]  Follow Up Time:

## 2022-12-01 NOTE — DISCHARGE NOTE OB - PATIENT PORTAL LINK FT
You can access the FollowMyHealth Patient Portal offered by [facility] by registering at the following website: [email or web address]. By joining [employer]’s FollowMyHealth portal, you will also be able to view your health information using other applications (apps) compatible with our system.

## 2022-12-01 NOTE — OB PROVIDER DELIVERY SUMMARY - NSPROVIDERDELIVERYNOTE_OBGYN_ALL_OB_FT
Patient fully dilated.  AROM performed, clear fluid noted.   of liveborn female infant. Head, shoulders and body delivered easily in OA position. True knot noted in umbilical cord.  Placenta delivered intact. Vaginal exam revealed intact cervix, vaginal walls, sulci. Second degree laceration identified and repaired in usual fashion with 2.0 chromic suture. Bimanual exam performed, with minimal clot evacuated. Fundus and lower uterine segment firm. Excellent hemostasis. Count was correct x2.     Present for delivery Dr. Benavides and YAMEL Garcia PGY1

## 2022-12-01 NOTE — OB PROVIDER DELIVERY SUMMARY - NSMODPPHRISK_OBGYN_A_OB
Over-distended uterus: Multiple gestation, polyhydramnios, Macrosomia/AMA - over 35 years of age AMA - over 35 years of age

## 2022-12-02 ENCOUNTER — APPOINTMENT (OUTPATIENT)
Dept: ANTEPARTUM | Facility: CLINIC | Age: 37
End: 2022-12-02

## 2022-12-02 LAB
COVID-19 SPIKE DOMAIN AB INTERP: POSITIVE
COVID-19 SPIKE DOMAIN ANTIBODY RESULT: >250 U/ML — HIGH
SARS-COV-2 IGG+IGM SERPL QL IA: >250 U/ML — HIGH
SARS-COV-2 IGG+IGM SERPL QL IA: POSITIVE
T PALLIDUM AB TITR SER: NEGATIVE — SIGNIFICANT CHANGE UP

## 2022-12-02 RX ORDER — IBUPROFEN 200 MG
600 TABLET ORAL EVERY 6 HOURS
Refills: 0 | Status: DISCONTINUED | OUTPATIENT
Start: 2022-12-02 | End: 2022-12-03

## 2022-12-02 RX ORDER — FAMOTIDINE 10 MG/ML
20 INJECTION INTRAVENOUS ONCE
Refills: 0 | Status: COMPLETED | OUTPATIENT
Start: 2022-12-02 | End: 2022-12-02

## 2022-12-02 RX ADMIN — Medication 600 MILLIGRAM(S): at 17:32

## 2022-12-02 RX ADMIN — Medication 600 MILLIGRAM(S): at 18:02

## 2022-12-02 RX ADMIN — Medication 975 MILLIGRAM(S): at 20:45

## 2022-12-02 RX ADMIN — Medication 975 MILLIGRAM(S): at 03:00

## 2022-12-02 RX ADMIN — Medication 975 MILLIGRAM(S): at 08:43

## 2022-12-02 RX ADMIN — Medication 600 MILLIGRAM(S): at 11:19

## 2022-12-02 RX ADMIN — Medication 600 MILLIGRAM(S): at 05:30

## 2022-12-02 RX ADMIN — SODIUM CHLORIDE 3 MILLILITER(S): 9 INJECTION INTRAMUSCULAR; INTRAVENOUS; SUBCUTANEOUS at 05:22

## 2022-12-02 RX ADMIN — Medication 600 MILLIGRAM(S): at 23:47

## 2022-12-02 RX ADMIN — FAMOTIDINE 20 MILLIGRAM(S): 10 INJECTION INTRAVENOUS at 21:27

## 2022-12-02 RX ADMIN — Medication 975 MILLIGRAM(S): at 20:15

## 2022-12-02 RX ADMIN — SIMETHICONE 80 MILLIGRAM(S): 80 TABLET, CHEWABLE ORAL at 12:13

## 2022-12-02 RX ADMIN — Medication 600 MILLIGRAM(S): at 12:10

## 2022-12-02 RX ADMIN — Medication 975 MILLIGRAM(S): at 07:49

## 2022-12-02 RX ADMIN — SIMETHICONE 80 MILLIGRAM(S): 80 TABLET, CHEWABLE ORAL at 19:33

## 2022-12-02 RX ADMIN — SODIUM CHLORIDE 3 MILLILITER(S): 9 INJECTION INTRAMUSCULAR; INTRAVENOUS; SUBCUTANEOUS at 14:01

## 2022-12-02 RX ADMIN — Medication 975 MILLIGRAM(S): at 15:25

## 2022-12-02 RX ADMIN — Medication 1 TABLET(S): at 11:19

## 2022-12-02 RX ADMIN — Medication 975 MILLIGRAM(S): at 03:30

## 2022-12-02 RX ADMIN — Medication 975 MILLIGRAM(S): at 16:20

## 2022-12-02 RX ADMIN — Medication 600 MILLIGRAM(S): at 05:00

## 2022-12-03 VITALS
SYSTOLIC BLOOD PRESSURE: 132 MMHG | OXYGEN SATURATION: 97 % | DIASTOLIC BLOOD PRESSURE: 62 MMHG | HEART RATE: 82 BPM | TEMPERATURE: 97 F

## 2022-12-03 RX ADMIN — Medication 1 TABLET(S): at 13:19

## 2022-12-03 RX ADMIN — Medication 600 MILLIGRAM(S): at 00:15

## 2022-12-03 RX ADMIN — Medication 975 MILLIGRAM(S): at 03:10

## 2022-12-03 RX ADMIN — Medication 600 MILLIGRAM(S): at 13:19

## 2022-12-03 RX ADMIN — Medication 975 MILLIGRAM(S): at 02:41

## 2022-12-03 RX ADMIN — Medication 975 MILLIGRAM(S): at 08:20

## 2022-12-03 RX ADMIN — Medication 600 MILLIGRAM(S): at 06:10

## 2022-12-03 RX ADMIN — Medication 975 MILLIGRAM(S): at 09:20

## 2022-12-03 RX ADMIN — Medication 600 MILLIGRAM(S): at 05:41

## 2022-12-03 NOTE — PROGRESS NOTE ADULT - SUBJECTIVE AND OBJECTIVE BOX
· Provider Specialty	OB      · Subjective and Objective:   Patient seen and examined at bedside, resting comfortably in no acute distress. Denies fever, chills, nausea or vomiting. She is tolerating a regular diet. She is ambulating without difficulty and voiding spontaneously. Pain is well controlled. Bleeding is less than a normal menses.    Physical Examination:  Vital Signs Last 24 Hrs  T(C): 36.6 (03 Dec 2022 05:45), Max: 36.6 (02 Dec 2022 13:15)  T(F): 97.9 (03 Dec 2022 05:45), Max: 97.9 (03 Dec 2022 05:45)  HR: 80 (03 Dec 2022 05:45) (77 - 85)  BP: 122/64 (03 Dec 2022 05:45) (115/58 - 129/68)  BP(mean): --  RR: 16 (03 Dec 2022 05:45) (16 - 16)  SpO2: 99% (03 Dec 2022 05:45) (96% - 99%)    Parameters below as of 03 Dec 2022 05:45  Patient On (Oxygen Delivery Method): room air          General: alert and oriented, no acute distress  Cardio: regular rate and rhythm  Respiratory: non labored respirations  Abdomen: soft, non-tender, non-distended, uterus firm, palpated below the umbilicus  VE: minimal lochia noted  Musculoskeletal: No erythema/edema, no calf tenderness bilaterally    MEDICATIONS  (STANDING):  acetaminophen     Tablet .. 975 milliGRAM(s) Oral <User Schedule>  diphtheria/tetanus/pertussis (acellular) Vaccine (Adacel) 0.5 milliLiter(s) IntraMuscular once  ibuprofen  Tablet. 600 milliGRAM(s) Oral every 6 hours  oxytocin Infusion 333.333 milliUNIT(s)/Min (1000 mL/Hr) IV Continuous <Continuous>  prenatal multivitamin 1 Tablet(s) Oral daily  sodium chloride 0.9% lock flush 3 milliLiter(s) IV Push every 8 hours      Labs:  Blood type: A Positive  Rubella IgG: RPR: Negative                           LABS:  cret                        12.4   12.54 )-----------( 393      ( 01 Dec 2022 20:00 )             37.0     Assessment and Plan:   36yo P4 s/p  on  now PPD#2 .  Patient is stable and doing well post-partum.   Blood type: A+    Plan:  - VS per unit protocol  - SCDs for DVT ppx  - Regular diet  - Pain well controlled, continue current pain regimen  - Encourage ambulation  - Discharge instructions reviewed  - Breastfeeding encouraged  - D/c planning initiated, patient to follow up in office in 6 weeks for post partum visit    Brunilda Alexander AGNP-c  [id number]
Patient seen and examined at bedside, resting comfortably in no acute distress. Denies fever, chills, nausea or vomiting. She is tolerating a regular diet. She is ambulating without difficulty and voiding spontaneously. Pain is well controlled. Bleeding is less than a normal menses.    Physical Examination:  Vital Signs: Vital Signs Last 24 Hrs  T(C): 36.8 (02 Dec 2022 06:00), Max: 36.8 (02 Dec 2022 06:00)  T(F): 98.2 (02 Dec 2022 06:00), Max: 98.2 (02 Dec 2022 06:00)  HR: 71 (02 Dec 2022 06:00) (71 - 100)  BP: 108/72 (02 Dec 2022 06:00) (108/72 - 144/64)  BP(mean): 102 (01 Dec 2022 20:50) (102 - 102)  RR: 17 (02 Dec 2022 06:00) (16 - 17)  SpO2: 98% (02 Dec 2022 06:00) (98% - 100%)    Parameters below as of 02 Dec 2022 06:00  Patient On (Oxygen Delivery Method): room air      General: alert and oriented, no acute distress  Cardio: regular rate and rhythm  Respiratory: non labored respirations  Abdomen: soft, non-tender, non-distended, uterus firm, palpated below the umbilicus  VE: minimal lochia noted  Musculoskeletal: No erythema/edema, no calf tenderness bilaterally    MEDICATIONS  (STANDING):  acetaminophen     Tablet .. 975 milliGRAM(s) Oral <User Schedule>  diphtheria/tetanus/pertussis (acellular) Vaccine (Adacel) 0.5 milliLiter(s) IntraMuscular once  ibuprofen  Tablet. 600 milliGRAM(s) Oral every 6 hours  oxytocin Infusion 333.333 milliUNIT(s)/Min (1000 mL/Hr) IV Continuous <Continuous>  prenatal multivitamin 1 Tablet(s) Oral daily  sodium chloride 0.9% lock flush 3 milliLiter(s) IV Push every 8 hours      Labs:  Blood type: A Positive  Rubella IgG: RPR: Negative                          12.4   12.54<H> >-----------< 393    (  @ 20:00 )             37.0        Assessment and Plan:   36yo P4 s/p  on  now PPD#1 .  Patient is stable and doing well post-partum.   Blood type: A+    Plan:  - VS per unit protocol  - SCDs for DVT ppx  - Regular diet  - Pain well controlled, continue current pain regimen  - Encourage ambulation  - Discharge instructions reviewed  - D/c planning initiated, patient to follow up in office in 6 weeks for post partum visit    Emir Suresh MD

## 2022-12-06 ENCOUNTER — NON-APPOINTMENT (OUTPATIENT)
Age: 37
End: 2022-12-06

## 2022-12-06 ENCOUNTER — APPOINTMENT (OUTPATIENT)
Dept: ANTEPARTUM | Facility: CLINIC | Age: 37
End: 2022-12-06

## 2022-12-08 ENCOUNTER — APPOINTMENT (OUTPATIENT)
Dept: OBGYN | Facility: CLINIC | Age: 37
End: 2022-12-08

## 2022-12-09 ENCOUNTER — APPOINTMENT (OUTPATIENT)
Dept: ANTEPARTUM | Facility: CLINIC | Age: 37
End: 2022-12-09

## 2023-01-23 ENCOUNTER — APPOINTMENT (OUTPATIENT)
Dept: OBGYN | Facility: CLINIC | Age: 38
End: 2023-01-23
Payer: COMMERCIAL

## 2023-01-23 VITALS
WEIGHT: 210 LBS | HEART RATE: 96 BPM | HEIGHT: 63 IN | SYSTOLIC BLOOD PRESSURE: 122 MMHG | DIASTOLIC BLOOD PRESSURE: 80 MMHG | BODY MASS INDEX: 37.21 KG/M2

## 2023-01-23 DIAGNOSIS — O09.523 SUPERVISION OF ELDERLY MULTIGRAVIDA, THIRD TRIMESTER: ICD-10-CM

## 2023-01-23 DIAGNOSIS — O99.210 OBESITY COMPLICATING PREGNANCY, UNSPECIFIED TRIMESTER: ICD-10-CM

## 2023-01-23 DIAGNOSIS — O09.299 SUPERVISION OF PREGNANCY WITH OTHER POOR REPRODUCTIVE OR OBSTETRIC HISTORY, UNSPECIFIED TRIMESTER: ICD-10-CM

## 2023-01-23 DIAGNOSIS — O24.414 GESTATIONAL DIABETES MELLITUS IN PREGNANCY, INSULIN CONTROLLED: ICD-10-CM

## 2023-01-23 DIAGNOSIS — O99.283 ENDOCRINE, NUTRITIONAL AND METABOLIC DISEASES COMPLICATING PREGNANCY, THIRD TRIMESTER: ICD-10-CM

## 2023-01-23 DIAGNOSIS — E03.9 ENDOCRINE, NUTRITIONAL AND METABOLIC DISEASES COMPLICATING PREGNANCY, THIRD TRIMESTER: ICD-10-CM

## 2023-01-23 DIAGNOSIS — O21.9 VOMITING OF PREGNANCY, UNSPECIFIED: ICD-10-CM

## 2023-01-23 PROCEDURE — 0503F POSTPARTUM CARE VISIT: CPT

## 2023-01-23 RX ORDER — ELECTROLYTES/DEXTROSE
32G X 4 MM SOLUTION, ORAL ORAL
Qty: 1 | Refills: 1 | Status: DISCONTINUED | COMMUNITY
Start: 2022-10-17 | End: 2023-01-23

## 2023-01-23 RX ORDER — LANCETS 28 GAUGE
EACH MISCELLANEOUS
Qty: 100 | Refills: 2 | Status: DISCONTINUED | COMMUNITY
Start: 2022-08-18 | End: 2023-01-23

## 2023-01-23 RX ORDER — AMOXICILLIN 500 MG/1
500 CAPSULE ORAL 3 TIMES DAILY
Qty: 15 | Refills: 0 | Status: DISCONTINUED | COMMUNITY
Start: 2022-12-01 | End: 2023-01-23

## 2023-01-23 RX ORDER — DOXYLAMINE SUCCINATE AND PYRIDOXINE HYDROCHLORIDE 10; 10 MG/1; MG/1
10-10 TABLET, DELAYED RELEASE ORAL
Qty: 120 | Refills: 1 | Status: DISCONTINUED | COMMUNITY
Start: 2022-04-28 | End: 2023-01-23

## 2023-01-23 RX ORDER — BLOOD-GLUCOSE METER
W/DEVICE KIT MISCELLANEOUS
Qty: 1 | Refills: 0 | Status: DISCONTINUED | COMMUNITY
Start: 2022-09-27

## 2023-01-23 RX ORDER — INSULIN LISPRO 100 [IU]/ML
100 INJECTION, SOLUTION INTRAVENOUS; SUBCUTANEOUS
Qty: 1 | Refills: 1 | Status: DISCONTINUED | COMMUNITY
Start: 2022-10-17 | End: 2023-01-23

## 2023-01-23 RX ORDER — BLOOD-GLUCOSE METER
KIT MISCELLANEOUS
Qty: 1 | Refills: 0 | Status: DISCONTINUED | COMMUNITY
Start: 2022-08-18 | End: 2023-01-23

## 2023-01-23 RX ORDER — ACETAMINOPHEN 325 MG/1
325 TABLET ORAL
Qty: 20 | Refills: 0 | Status: DISCONTINUED | COMMUNITY
Start: 2022-04-28 | End: 2023-01-23

## 2023-01-23 RX ORDER — ISOPROPYL ALCOHOL 0.7 ML/ML
SWAB TOPICAL
Qty: 1 | Refills: 3 | Status: DISCONTINUED | COMMUNITY
Start: 2022-10-17 | End: 2023-01-23

## 2023-01-23 RX ORDER — BLOOD SUGAR DIAGNOSTIC
STRIP MISCELLANEOUS 4 TIMES DAILY
Qty: 100 | Refills: 1 | Status: DISCONTINUED | COMMUNITY
Start: 2022-08-18 | End: 2023-01-23

## 2023-01-23 NOTE — HISTORY OF PRESENT ILLNESS
[Postpartum Follow Up] : postpartum follow up [Delivery Date: ___] : on [unfilled] [] : delivered by vaginal delivery [Female] : Delivery History: baby girl [Wt. ___] : weighing [unfilled] [Pertussis Vaccine] : Pertussis vaccine administered [Boy] : baby is a boy [___ Lbs] : [unfilled] lbs [___ Oz] : [unfilled] oz [Living at Home] : is currently living at home [Bottle Feeding] : bottle feeding [Intended Contraception] : Intended Contraception: [Complications:___] : no complications [Rhogam] : Rhogam was not administered [Rubella Vaccine] : Rubella vaccine was not administered [BTL] : no tubal ligation [Breastfeeding] : not currently nursing [BF with Difficulty] : nursing without difficulty [Resumed Menses] : has not resumed her menses [Resumed [place]] : has not resumed intercourse [Awake] : awake [Alert] : alert [Acute Distress] : no acute distress [Mass] : no breast mass [Nipple Discharge] : no nipple discharge [Axillary LAD] : no axillary lymphadenopathy [Soft] : soft [Tender] : non tender [Distended] : not distended [Oriented x3] : oriented to person, place, and time [Depressed Mood] : not depressed [Flat Affect] : affect not flat [Labia Majora Erythema] : no erythema of the labia majora [Normal] : external genitalia [Motion Tenderness] : there was no cervical motion tenderness [Tenderness] : nontender [Adnexa Tenderness] : were not tender [Doing Well] : is doing well [No Sign of Infection] : is showing no signs of infection [Excellent Pain Control] : has excellent pain control [None] : None [FreeTextEntry8] : This 36 yo P4 presents post vaginal delivery for PPV; feels well, voiding and stooling without issue, considering IUD/Nexplanon. [de-identified] : Nl PPV; Hx of GDM [de-identified] : Will proceed with 2 HR GTT; Billing Dept tasked for IUD coverage- literature in hand; Kegel exercise sheet in hand; RTO prn or for annual in 4 months

## 2023-02-08 ENCOUNTER — RX RENEWAL (OUTPATIENT)
Age: 38
End: 2023-02-08

## 2023-02-08 RX ORDER — PEN NEEDLE, DIABETIC 32GX 5/32"
32G X 4 MM NEEDLE, DISPOSABLE MISCELLANEOUS
Qty: 100 | Refills: 2 | Status: ACTIVE | COMMUNITY
Start: 2023-02-08 | End: 1900-01-01

## 2023-02-13 ENCOUNTER — TRANSCRIPTION ENCOUNTER (OUTPATIENT)
Age: 38
End: 2023-02-13

## 2023-02-14 ENCOUNTER — TRANSCRIPTION ENCOUNTER (OUTPATIENT)
Age: 38
End: 2023-02-14

## 2023-02-14 ENCOUNTER — RX RENEWAL (OUTPATIENT)
Age: 38
End: 2023-02-14

## 2023-02-21 ENCOUNTER — TRANSCRIPTION ENCOUNTER (OUTPATIENT)
Age: 38
End: 2023-02-21

## 2023-02-27 RX ORDER — LEVOTHYROXINE SODIUM 75 UG/1
75 CAPSULE ORAL DAILY
Qty: 30 | Refills: 0 | Status: DISCONTINUED | COMMUNITY
Start: 2022-10-13 | End: 2023-02-27

## 2023-02-27 RX ORDER — LEVOTHYROXINE SODIUM 0.07 MG/1
75 TABLET ORAL DAILY
Qty: 30 | Refills: 1 | Status: DISCONTINUED | COMMUNITY
Start: 2022-08-07 | End: 2023-02-27

## 2023-03-01 LAB
ESTIMATED AVERAGE GLUCOSE: 120 MG/DL
HBA1C MFR BLD HPLC: 5.8 %
TSH SERPL-ACNC: 1.03 UIU/ML

## 2023-03-02 ENCOUNTER — RX RENEWAL (OUTPATIENT)
Age: 38
End: 2023-03-02

## 2023-03-02 RX ORDER — FAMOTIDINE 40 MG/1
40 TABLET, FILM COATED ORAL TWICE DAILY
Qty: 180 | Refills: 0 | Status: ACTIVE | COMMUNITY
Start: 2022-06-03 | End: 1900-01-01

## 2023-03-06 ENCOUNTER — APPOINTMENT (OUTPATIENT)
Dept: INTERNAL MEDICINE | Facility: CLINIC | Age: 38
End: 2023-03-06
Payer: COMMERCIAL

## 2023-03-06 VITALS
DIASTOLIC BLOOD PRESSURE: 68 MMHG | SYSTOLIC BLOOD PRESSURE: 124 MMHG | WEIGHT: 212 LBS | OXYGEN SATURATION: 97 % | HEART RATE: 90 BPM | TEMPERATURE: 97.9 F | HEIGHT: 63 IN | BODY MASS INDEX: 37.56 KG/M2

## 2023-03-06 DIAGNOSIS — H10.10 ACUTE ATOPIC CONJUNCTIVITIS, UNSPECIFIED EYE: ICD-10-CM

## 2023-03-06 PROCEDURE — 99214 OFFICE O/P EST MOD 30 MIN: CPT

## 2023-03-06 RX ORDER — OLOPATADINE HYDROCHLORIDE 2 MG/ML
0.2 SOLUTION OPHTHALMIC DAILY
Qty: 1 | Refills: 3 | Status: ACTIVE | COMMUNITY
Start: 2023-03-06 | End: 1900-01-01

## 2023-03-06 NOTE — ASSESSMENT
[FreeTextEntry1] : Hypothyroid:\par TSH wnl on recent labs, was taking 225mcg daily, increased to 275 a few weeks ago d/t pills she had around.  \par Refill sent in for 250mcg daily, recheck labs in 2-3 months.  \par \par GDM:\par A1C 5.8 on recent labs, off meds.  Will monitor\par \par Allergies:\par Rx sent in for eye drops.\par Taking claritin for a while, now not as effective, recommend switching to allegra. \par Will schedule f/u with allergist.  \par

## 2023-03-06 NOTE — HISTORY OF PRESENT ILLNESS
[de-identified] : 37 y.o. female, PMHx hypothyroidism, epigastric pain, GDM, fibroids, year-round environmental allergies. \par Needs refill on levothyroxine.  \par Feeling overall well except for itchy eyes from allergies. \par Levothyroxine dose up to 300mcg during pregnancy.  Baby born in October, since given birth has taken 225, then ran out of 25mcg, so started 275 a few weeks ago.  Had labs done last week.  \par Insulin during pregnancy, no meds now for diabetes.  \par Lots of allergies.  Especially itchy eyes.  Takes loratadine daily, 10mg, sometimes takes up to 20.  Was getting shots at allergists prior to pregnancy, but that office has now closed.  \par

## 2023-03-11 LAB
ALBUMIN SERPL ELPH-MCNC: 3.7 G/DL
ALP BLD-CCNC: 114 U/L
ALT SERPL-CCNC: 13 U/L
ANION GAP SERPL CALC-SCNC: 10 MMOL/L
AST SERPL-CCNC: 15 U/L
BILIRUB SERPL-MCNC: 0.2 MG/DL
BUN SERPL-MCNC: 9 MG/DL
CALCIUM SERPL-MCNC: 9.6 MG/DL
CHLORIDE SERPL-SCNC: 103 MMOL/L
CO2 SERPL-SCNC: 24 MMOL/L
CREAT SERPL-MCNC: 0.64 MG/DL
EGFR: 117 ML/MIN/1.73M2
GLUCOSE SERPL-MCNC: 147 MG/DL
GP B STREP DNA SPEC QL NAA+PROBE: NOT DETECTED
HIV1+2 AB SPEC QL IA.RAPID: NONREACTIVE
POTASSIUM SERPL-SCNC: 4.3 MMOL/L
PROT SERPL-MCNC: 6.7 G/DL
SODIUM SERPL-SCNC: 137 MMOL/L
SOURCE GBS: NORMAL
T3FREE SERPL-MCNC: 2.75 PG/ML
T4 FREE SERPL-MCNC: 1.1 NG/DL
TSH SERPL-ACNC: 1.51 UIU/ML

## 2023-03-25 NOTE — ED ADULT TRIAGE NOTE - PAIN RATING/NUMBER SCALE (0-10): ACTIVITY
Subjective   History of Present Illness  Patient is a 23-year-old male with significant past medical history positive for ADHD, anxiety, depression, substance abuse presenting to the ER complaints of periumbilical abdominal pain since yesterday.  Patient denies fever, cough, nausea, vomiting, shortness of breath, constipation, diarrhea, known sick contacts or any additional symptoms today.  Patient denies any ill alleviating or worsening factors.    History provided by:  Patient   used: No        Review of Systems   Constitutional: Negative.  Negative for fever.   HENT: Negative.    Respiratory: Negative.    Cardiovascular: Negative.  Negative for chest pain.   Gastrointestinal: Positive for abdominal pain.   Endocrine: Negative.    Genitourinary: Negative.  Negative for dysuria.   Skin: Negative.    Neurological: Negative.    Psychiatric/Behavioral: Negative.    All other systems reviewed and are negative.      Past Medical History:   Diagnosis Date   • ADHD (attention deficit hyperactivity disorder)    • Anxiety    • Depression    • Substance abuse        No Known Allergies    Past Surgical History:   Procedure Laterality Date   • ORIF WRIST FRACTURE         Family History   Problem Relation Age of Onset   • Depression Mother    • Drug abuse Father    • Bipolar disorder Brother        Social History     Socioeconomic History   • Marital status: Single   Tobacco Use   • Smoking status: Every Day     Packs/day: 0.25     Years: 3.00     Pack years: 0.75     Types: Cigarettes     Start date: 8/15/2013   • Tobacco comments:     states he wants to quit   Substance and Sexual Activity   • Alcohol use: No   • Drug use: Yes     Types: Marijuana   • Sexual activity: Never           Objective   Physical Exam  Vitals and nursing note reviewed.   Constitutional:       General: He is not in acute distress.     Appearance: He is well-developed. He is not diaphoretic.   HENT:      Head: Normocephalic and  atraumatic.      Right Ear: External ear normal.      Left Ear: External ear normal.      Nose: Nose normal.   Eyes:      Conjunctiva/sclera: Conjunctivae normal.      Pupils: Pupils are equal, round, and reactive to light.   Neck:      Vascular: No JVD.      Trachea: No tracheal deviation.   Cardiovascular:      Rate and Rhythm: Normal rate and regular rhythm.      Heart sounds: Normal heart sounds. No murmur heard.  Pulmonary:      Effort: Pulmonary effort is normal. No respiratory distress.      Breath sounds: Normal breath sounds. No wheezing.   Abdominal:      General: Bowel sounds are normal.      Palpations: Abdomen is soft.      Tenderness: There is abdominal tenderness in the periumbilical area.   Musculoskeletal:         General: No deformity. Normal range of motion.      Cervical back: Normal range of motion and neck supple.   Skin:     General: Skin is warm and dry.      Capillary Refill: Capillary refill takes less than 2 seconds.      Coloration: Skin is not pale.      Findings: No erythema or rash.   Neurological:      Mental Status: He is alert and oriented to person, place, and time.      Cranial Nerves: No cranial nerve deficit.   Psychiatric:         Behavior: Behavior normal.         Thought Content: Thought content normal.         Procedures       Results for orders placed or performed during the hospital encounter of 03/21/23   Comprehensive Metabolic Panel    Specimen: Blood   Result Value Ref Range    Glucose 103 (H) 65 - 99 mg/dL    BUN 14 6 - 20 mg/dL    Creatinine 1.02 0.76 - 1.27 mg/dL    Sodium 138 136 - 145 mmol/L    Potassium 4.0 3.5 - 5.2 mmol/L    Chloride 102 98 - 107 mmol/L    CO2 27.3 22.0 - 29.0 mmol/L    Calcium 9.7 8.6 - 10.5 mg/dL    Total Protein 7.5 6.0 - 8.5 g/dL    Albumin 4.7 3.5 - 5.2 g/dL    ALT (SGPT) 26 1 - 41 U/L    AST (SGOT) 23 1 - 40 U/L    Alkaline Phosphatase 61 39 - 117 U/L    Total Bilirubin 1.0 0.0 - 1.2 mg/dL    Globulin 2.8 gm/dL    A/G Ratio 1.7 g/dL     BUN/Creatinine Ratio 13.7 7.0 - 25.0    Anion Gap 8.7 5.0 - 15.0 mmol/L    eGFR 105.9 >60.0 mL/min/1.73   Lipase    Specimen: Blood   Result Value Ref Range    Lipase 22 13 - 60 U/L   Lactic Acid, Plasma    Specimen: Blood   Result Value Ref Range    Lactate 1.1 0.5 - 2.0 mmol/L   C-reactive Protein    Specimen: Blood   Result Value Ref Range    C-Reactive Protein <0.30 0.00 - 0.50 mg/dL   Sedimentation Rate    Specimen: Blood   Result Value Ref Range    Sed Rate <1 0 - 15 mm/hr   CBC Auto Differential    Specimen: Blood   Result Value Ref Range    WBC 11.70 (H) 3.40 - 10.80 10*3/mm3    RBC 5.57 4.14 - 5.80 10*6/mm3    Hemoglobin 16.3 13.0 - 17.7 g/dL    Hematocrit 48.2 37.5 - 51.0 %    MCV 86.5 79.0 - 97.0 fL    MCH 29.3 26.6 - 33.0 pg    MCHC 33.8 31.5 - 35.7 g/dL    RDW 12.0 (L) 12.3 - 15.4 %    RDW-SD 38.5 37.0 - 54.0 fl    MPV 8.9 6.0 - 12.0 fL    Platelets 207 140 - 450 10*3/mm3    Neutrophil % 83.3 (H) 42.7 - 76.0 %    Lymphocyte % 9.2 (L) 19.6 - 45.3 %    Monocyte % 6.6 5.0 - 12.0 %    Eosinophil % 0.3 0.3 - 6.2 %    Basophil % 0.3 0.0 - 1.5 %    Immature Grans % 0.3 0.0 - 0.5 %    Neutrophils, Absolute 9.75 (H) 1.70 - 7.00 10*3/mm3    Lymphocytes, Absolute 1.08 0.70 - 3.10 10*3/mm3    Monocytes, Absolute 0.77 0.10 - 0.90 10*3/mm3    Eosinophils, Absolute 0.03 0.00 - 0.40 10*3/mm3    Basophils, Absolute 0.03 0.00 - 0.20 10*3/mm3    Immature Grans, Absolute 0.04 0.00 - 0.05 10*3/mm3    nRBC 0.0 0.0 - 0.2 /100 WBC   Green Top (Gel)   Result Value Ref Range    Extra Tube Hold for add-ons.    Lavender Top   Result Value Ref Range    Extra Tube hold for add-on    Gold Top - SST   Result Value Ref Range    Extra Tube Hold for add-ons.    Light Blue Top   Result Value Ref Range    Extra Tube Hold for add-ons.      CT Abdomen Pelvis Without Contrast   Final Result     No acute findings in the abdomen or pelvis.       This report was finalized on 3/21/2023 4:17 PM by Dr. Ramón Medina MD.              ED Course  ED  Course as of 03/24/23 2303   Tue Mar 21, 2023   1626 CT Abdomen Pelvis Without Contrast [SM]      ED Course User Index  [SM] Angelica Kwon APRN                                           Medical Decision Making  Patient is a 23-year-old male with significant past medical history positive for ADHD, anxiety, depression, substance abuse presenting to the ER complaints of periumbilical abdominal pain since yesterday.  Patient denies fever, cough, nausea, vomiting, shortness of breath, constipation, diarrhea, known sick contacts or any additional symptoms today.  Patient denies any ill alleviating or worsening factors.    Advised patient to follow-up with primary care doctor advised patient to return to the ER with new or worsening symptoms.  Patient verbalized understanding.    Abdominal pain, unspecified abdominal location: complicated acute illness or injury  Amount and/or Complexity of Data Reviewed  Labs: ordered.  Radiology: ordered. Decision-making details documented in ED Course.          Final diagnoses:   Abdominal pain, unspecified abdominal location       ED Disposition  ED Disposition     ED Disposition   Discharge    Condition   Stable    Comment   --             Violeta Day, ROD  [address] [facility] [address]  [phone]    Schedule an appointment as soon as possible for a visit in 2 days           Medication List      No changes were made to your prescriptions during this visit.          Angelica Kwon APRN  03/24/23 2303     0

## 2023-05-25 NOTE — ED PROVIDER NOTE - PRINCIPAL DIAGNOSIS
HISTORY OF PRESENT ILLNESS      Zofia Milner is a 66 y.o. female. /66 (Site: Right Upper Arm, Position: Sitting, Cuff Size: Medium Adult)   Pulse 53   Temp 96.8 °F (36 °C) (Temporal)   Resp 16   Ht 5' 4\" (1.626 m)   Wt 159 lb (72.1 kg)   SpO2 98%   BMI 27.29 kg/m²     Seen 5/20/23 at [facility] ER for syncope    Chief Complaint   Patient presents with   SYNCOPE     Patient presents with syncope. States was working at the [employer], when she got lightheaded, when she stood up, states she passed out. States she does not hit her head, stomach caught her to bring her down. Woke up fairly quickly as per report. Patient denies previous episode. Currently she has no complaints. She states she had a light breakfast, thinks it was because she had not eaten anything. She stays very busy, and stressful this morning at work. She denies having chest pain, shortness of breath, abdominal pain, leg swelling. ED Course: Patient with nonfocal exam. Patient is neurovascular tact, clinically looks well. I doubt that she is a PE, she has no leg swelling, no risk factors, she is not tachycardic. ECG is unremarkable here. Will check labs. There is no gross signs of trauma. I do not feel neuroimaging such as head CT is indicated this time. Do not suspect traumatic injury. 6:20p on reexamination, patient has no complaints. Work-up here is unremarkable. Troponin is 22, but not having chest pain or shortness of breath. Creatinine is stable. I did speak with patient about possible admission for syncope. At this point she states she feels good and wants to go home and does not want to be admitted to the hospital. I suspect this has to do with the fact that she can eat or drink anything since early the morning. I did speak with the daughter who is a physician, I did review all results as well as her findings here. Also discussed our conversation I had with the patient, and the patient wants go home.  I did instruct Less than 8 weeks gestation of pregnancy

## 2023-05-30 ENCOUNTER — APPOINTMENT (OUTPATIENT)
Dept: INTERNAL MEDICINE | Facility: CLINIC | Age: 38
End: 2023-05-30

## 2023-06-23 NOTE — OB RN PATIENT PROFILE - TOBACCO USE
Patient : Nellie Fregoso Age: 39 year old Sex: female   MRN: [id number] Encounter Date: 6/23/2023    History     Chief Complaint   Patient presents with   • Back Pain     X 3 day after lifting a heavy box at work      Nellie Fregoso is a 39 year old with past medical history of frequent kidney stones presenting to the emergency department for back pain. Patient states it started 3 days ago when she was at work. She states she lifted a heavy box and shortly after felt diffuse \"all over\" back pain. She was taking ibuprofen with minimal relief. She states today the back pain has primarily localized to the right mid to lower back and right flank. She also endorses urinary frequency, dark urine and foul smelling urine starting today. She has never had musculoskeletal back pain before but states she has had similar pain with prior kidney stones. She denies any abdominal pain, nausea/vomiting, diarrhea, fever/chills or vaginal complaints. She is tolerating fluids but endorses low appetite. She denies any numbness, tingling or weakness to extremities. Denies any saddle anesthesia or loss of bowel/bladder.     Allergies   Allergen Reactions   • Penicillins HIVES     Denies anaphylaxis or airway involvement; tolerated cephalosporins previously       No current facility-administered medications for this encounter.     Current Outpatient Medications   Medication Sig   • naproxen (NAPROSYN) 500 MG tablet Take 1 tablet by mouth in the morning and 1 tablet in the evening. Take with meals.   • cefpodoxime (VANTIN) 200 MG tablet Take 1 tablet by mouth in the morning and 1 tablet in the evening. Do all this for 10 days.       Past Medical History:   Diagnosis Date   • No pertinent past medical history        No past surgical history on file.    No family history on file.    Social History     Tobacco Use   • Smoking status: Never   • Smokeless tobacco: Never   Substance Use Topics   • Alcohol use: Yes   • Drug use: Yes     Types:  Marijuana     Review of Systems     Review of Systems   Constitutional: Positive for appetite change. Negative for activity change, chills, diaphoresis, fatigue and fever.   HENT: Negative.    Respiratory: Negative for cough, chest tightness and shortness of breath.    Cardiovascular: Negative for chest pain, palpitations and leg swelling.   Gastrointestinal: Negative for abdominal distention, abdominal pain, constipation, diarrhea, nausea and vomiting.   Genitourinary: Positive for flank pain (right), frequency and hematuria (dark urine). Negative for decreased urine volume, difficulty urinating, dysuria, genital sores, vaginal bleeding, vaginal discharge and vaginal pain.   Musculoskeletal: Positive for back pain and myalgias.   Skin: Negative for color change, pallor, rash and wound.   Neurological: Negative for syncope, weakness and numbness.     Physical Exam     ED Triage Vitals [06/23/23 0658]   ED Triage Vitals Group      Temp 98.6 °F (37 °C)      Heart Rate (!) 105      Resp 16      BP 97/59      SpO2 100 %      EtCO2 mmHg       Height       Weight       Weight Scale Used       BMI (Calculated)       IBW/kg (Calculated)      Physical Exam  Vitals and nursing note reviewed.   Constitutional:       General: She is not in acute distress.     Appearance: Normal appearance. She is not ill-appearing or toxic-appearing.   HENT:      Head: Normocephalic.      Mouth/Throat:      Mouth: Mucous membranes are moist.      Pharynx: Oropharynx is clear.      Neck: Normal range of motion and neck supple. No tenderness. No pain with movement.   Eyes:      Extraocular Movements: Extraocular movements intact.      Conjunctiva/sclera: Conjunctivae normal.      Pupils: Pupils are equal, round, and reactive to light.   Cardiovascular:      Rate and Rhythm: Normal rate and regular rhythm.      Pulses: Normal pulses.      Heart sounds: Normal heart sounds.   Pulmonary:      Effort: Pulmonary effort is normal. No respiratory  distress.      Breath sounds: Normal breath sounds and air entry.   Abdominal:      General: Abdomen is flat. Bowel sounds are normal. There is no distension.      Palpations: Abdomen is soft. There is no mass.      Tenderness: There is no abdominal tenderness. There is right CVA tenderness. There is no left CVA tenderness, guarding or rebound.      Hernia: No hernia is present.      Comments: No focal abdominal tenderness. Tenderness to palpation of right flank and +CVAT. No rebound or guarding.    Musculoskeletal:      Thoracic back: Tenderness present.      Lumbar back: Tenderness present.      Comments: Diffuse right sided paraspinal thoracic and lumbar tenderness to palpation. No focal midline tenderness.    Skin:     General: Skin is warm and dry.      Capillary Refill: Capillary refill takes less than 2 seconds.      Findings: No rash.   Neurological:      General: No focal deficit present.      Mental Status: She is alert and oriented to person, place, and time.       Lab Results     Results for orders placed or performed during the hospital encounter of 06/23/23   Comprehensive Metabolic Panel   Result Value Ref Range    Fasting Status      Sodium 136 135 - 145 mmol/L    Potassium 3.3 (L) 3.4 - 5.1 mmol/L    Chloride 100 97 - 110 mmol/L    Carbon Dioxide 25 21 - 32 mmol/L    Anion Gap 14 7 - 19 mmol/L    Glucose 106 (H) 70 - 99 mg/dL    BUN 7 6 - 20 mg/dL    Creatinine 0.77 0.51 - 0.95 mg/dL    Glomerular Filtration Rate >90 >=60    BUN/Cr 9 7 - 25    Calcium 9.3 8.4 - 10.2 mg/dL    Bilirubin, Total 1.6 (H) 0.2 - 1.0 mg/dL    GOT/AST 15 <=37 Units/L    GPT/ALT 19 <64 Units/L    Alkaline Phosphatase 75 45 - 117 Units/L    Albumin 3.2 (L) 3.6 - 5.1 g/dL    Protein, Total 7.5 6.4 - 8.2 g/dL    Globulin 4.3 (H) 2.0 - 4.0 g/dL    A/G Ratio 0.7 (L) 1.0 - 2.4   CBC with Automated Differential (performable only)   Result Value Ref Range    WBC 17.2 (H) 4.2 - 11.0 K/mcL    RBC 4.38 4.00 - 5.20 mil/mcL    HGB 12.6  12.0 - 15.5 g/dL    HCT 37.6 36.0 - 46.5 %    MCV 85.8 78.0 - 100.0 fl    MCH 28.8 26.0 - 34.0 pg    MCHC 33.5 32.0 - 36.5 g/dL    RDW-CV 13.2 11.0 - 15.0 %    RDW-SD 41.3 39.0 - 50.0 fL     140 - 450 K/mcL    NRBC 0 <=0 /100 WBC    Neutrophil, Percent 72 %    Lymphocytes, Percent 11 %    Mono, Percent 16 %    Eosinophils, Percent 1 %    Basophils, Percent 0 %    Immature Granulocytes 0 %    Absolute Neutrophils 12.3 (H) 1.8 - 7.7 K/mcL    Absolute Lymphocytes 1.8 1.0 - 4.8 K/mcL    Absolute Monocytes 2.7 (H) 0.3 - 0.9 K/mcL    Absolute Eosinophils  0.2 0.0 - 0.5 K/mcL    Absolute Basophils 0.1 0.0 - 0.3 K/mcL    Absolute Immature Granulocytes 0.1 0.0 - 0.2 K/mcL       Radiology Results     Imaging Results          CT ABDOMEN PELVIS FOR KIDNEY STONES WO CONTRAST (Final result)  Result time 06/23/23 08:31:15    Final result                 Impression:      1.    Inflammatory changes of the right kidney with mild hydronephrosis and  moderate perinephric fat stranding.  There is a nonobstructing stone in the  upper right kidney.  No convincing calcified right renal or ureteral stone  is seen and findings may represent recently passed stone although other  etiologies for infection/inflammation are not excluded and clinical  correlation/follow-up is needed.  2.   Fatty liver.  3.   The uterus is enlarged and lobular which can be seen with  leiomyomatous changes.      FOR PHYSICIAN USE ONLY - Please note that this report was generated using  voice recognition software.  If you require clarification or feel that  there has been an error in this report please contact me through  [employer].  Thank you very much for allowing me to participate in the  care of your patient.     Electronically Signed by: ARSLAN CHAMBERS M.D.   Signed on: 6/23/2023 8:31 AM   Workstation ID: [id number]             Narrative:    EXAM: CT ABDOMEN PELVIS FOR KIDNEY STONES WO CONTRAST    CLINICAL INDICATION: 39-year-old female with right  flank pain    COMPARISON:  None.     TECHNIQUE: Axial images of the abdomen and pelvis were obtained without  intravenous contrast utilizing a low-dose renal stone protocol.  Routine  multiplanar reformatted imaging was also obtained. mA and/or kVp was  adjusted for patient size.    FINDINGS:   Limited images of the lung bases are unremarkable.    Evaluation of solid abdominal organs is suboptimal without intravenous  contrast material.  The visualized portions of the liver demonstrate  hypoattenuation consistent with fatty infiltration.  The visualized  portions of the spleen are unremarkable.  The adrenal glands, pancreas, and  gallbladder have an unremarkable nonenhanced appearance.       There is a punctate nonobstructing stone in the upper pole of the right  kidney.  There is moderate right perinephric fat stranding and mild right  hydronephrosis.  No convincing calcified ureteral stone is seen however.   The left kidney has an unremarkable nonenhanced CT appearance.  The urinary  bladder is mostly decompressed.    There is mild atherosclerotic calcification but no aneurysmal dilatation of  the abdominal aorta.  There is trace simple appearing free pelvic fluid  which is likely physiologic given patient's age.  The ovaries are present.   The uterus is mildly enlarged and lobular.  No enlarged abdominal or pelvic  lymph nodes are seen.  Small round pelvic calcifications are likely  phleboliths.    Evaluation of the bowel is suboptimal without oral contrast material.   There are no dilated loops of bowel.  The appendix is not confidently  identified.    Bone windows demonstrate mild degenerative change at L4-L5 an L5-S1.                                  ED Medications     ED Medication Orders (From admission, onward)    Ordered Start     Status Ordering Provider    06/23/23 0905 06/23/23 0906  potassium CHLORIDE (KLOR-CON M) navid ER tablet 20 mEq  ONCE         Last MAR action: ОЛЬГА Garay    06/23/23  0810 06/23/23 0811  lactated ringers bolus 1,000 mL  ONCE         Last MAR action: Completed JAYLAN VASQUEZ    06/23/23 0810 06/23/23 0811  cefTRIAXone (ROCEPHIN) syringe 1,000 mg  ONCE         Last MAR action: Given AJYLAN VASQUEZ    06/23/23 0802 06/23/23 0803  ketorolac (TORADOL) injection 15 mg  ONCE         Last MAR action: Given ОЛЬГА DANGELO        ED Course     Vitals:    06/23/23 0658 06/23/23 0914   BP: 97/59 119/74   Pulse: (!) 105 84   Resp: 16 18   Temp: 98.6 °F (37 °C) 98.4 °F (36.9 °C)   TempSrc: Oral Oral   SpO2: 100% 100%   LMP: 05/23/2023     Medical Decision Making  39 year old with past medical history of frequent kidney stones presenting to the emergency department for right mid to lower back and right flank pain for 3 days. She also endorses urinary frequency, dark urine and foul smelling urine starting today.    Patient is well appearing, vitals normal and in no acute distress. Physical exam is notable for diffuse right sided paraspinal thoracic and lumbar tenderness to palpation. Right flank ttp and +CVAT. Abdomen is soft, non-distended and non-tender. No rebound or guarding. Lungs CTAB. Heart sounds normal. Patient appears well hydrated. NV status and strength intact to extremities. No focal neurologic findings.     CBC obtained and notable for leukocytosis of 17.2. CMP largely unremarkable, mild hypokalemia at 3.3. Urine pregnancy negative. Urinalysis shows signs positive nitrites, large leukocytes, ketones, protein, urobilinogen, moderate blood and moderate bacteria.     CT abdomen/pelvis non-contrast ordered, results show inflammatory changes of the right kidney with mild hydronephrosis and moderate perinephric fat stranding. There is a nonobstructing stone in the upper right kidney. No convincing calcified right renal or ureteral stone is seen and findings may represent recently passed stone although other etiologies for infection/inflammation.     Likely diagnosis of  pyelonephritis. Other considered and less likely diagnoses include acute cystitis, nephrolithiasis and musculoskeletal etiology. Low concern for systemic infection/sepsis given normal vital signs. Low concern for other emergent abdominal etiology such as appendicitis, bowel obstruction, cholecystitis/cholelithiasis, pancreatitis or pelvic etiology (ectopic pregnancy, TOA, ovarian torsion).     Patient given IV fluids, potassium replacement, IV rocephin and toradol while in ED. Good pain control and symptom relief achieved. Patient tolerating PO without any issues. No indication for inpatient admission at this time. Will discharge home with instructions on increased fluids, rest and prescriptions for Naproxen and Cefpodoxime. Advised to follow up with primary care in 2-3 days. Also advised to schedule follow up appointment with urology. Strict ER return precautions discussed.    Pyelonephritis: acute illness or injury  Amount and/or Complexity of Data Reviewed  Labs: ordered.  Radiology: ordered and independent interpretation performed.      Risk  Prescription drug management.                        ED Diagnosis     1. Pyelonephritis          Disposition     Discharge 6/23/2023  9:26 AM  Nellie Fregoso discharge to home/self care.           Karlie Bray, CNP  07/02/23 1240     Never smoker

## 2023-08-07 ENCOUNTER — RX RENEWAL (OUTPATIENT)
Age: 38
End: 2023-08-07

## 2023-08-21 ENCOUNTER — RX RENEWAL (OUTPATIENT)
Age: 38
End: 2023-08-21

## 2023-09-15 ENCOUNTER — APPOINTMENT (OUTPATIENT)
Dept: INTERNAL MEDICINE | Facility: CLINIC | Age: 38
End: 2023-09-15
Payer: COMMERCIAL

## 2023-09-15 VITALS
WEIGHT: 221 LBS | HEART RATE: 75 BPM | BODY MASS INDEX: 39.16 KG/M2 | DIASTOLIC BLOOD PRESSURE: 73 MMHG | TEMPERATURE: 97.9 F | OXYGEN SATURATION: 97 % | SYSTOLIC BLOOD PRESSURE: 109 MMHG | HEIGHT: 63 IN

## 2023-09-15 DIAGNOSIS — O26.20 PREGNANCY CARE FOR PATIENT WITH RECURRENT PREGNANCY LOSS, UNSPECIFIED TRIMESTER: ICD-10-CM

## 2023-09-15 LAB
25(OH)D3 SERPL-MCNC: 9.2 NG/ML
ALBUMIN SERPL ELPH-MCNC: 4.2 G/DL
ALP BLD-CCNC: 61 U/L
ALT SERPL-CCNC: 16 U/L
ANION GAP SERPL CALC-SCNC: 13 MMOL/L
AST SERPL-CCNC: 15 U/L
BASOPHILS # BLD AUTO: 0.09 K/UL
BASOPHILS NFR BLD AUTO: 1 %
BILIRUB SERPL-MCNC: <0.2 MG/DL
BUN SERPL-MCNC: 13 MG/DL
CALCIUM SERPL-MCNC: 9.1 MG/DL
CHLORIDE SERPL-SCNC: 106 MMOL/L
CHOLEST SERPL-MCNC: 136 MG/DL
CO2 SERPL-SCNC: 22 MMOL/L
CREAT SERPL-MCNC: 0.87 MG/DL
EGFR: 87 ML/MIN/1.73M2
EOSINOPHIL # BLD AUTO: 0.48 K/UL
EOSINOPHIL NFR BLD AUTO: 5.3 %
ESTIMATED AVERAGE GLUCOSE: 120 MG/DL
GLUCOSE SERPL-MCNC: 118 MG/DL
HBA1C MFR BLD HPLC: 5.8 %
HCT VFR BLD CALC: 38.4 %
HDLC SERPL-MCNC: 36 MG/DL
HGB BLD-MCNC: 13 G/DL
IMM GRANULOCYTES NFR BLD AUTO: 0.2 %
LDLC SERPL CALC-MCNC: 78 MG/DL
LYMPHOCYTES # BLD AUTO: 3.51 K/UL
LYMPHOCYTES NFR BLD AUTO: 39 %
MAN DIFF?: NORMAL
MCHC RBC-ENTMCNC: 30.6 PG
MCHC RBC-ENTMCNC: 33.9 GM/DL
MCV RBC AUTO: 90.4 FL
MONOCYTES # BLD AUTO: 0.58 K/UL
MONOCYTES NFR BLD AUTO: 6.4 %
NEUTROPHILS # BLD AUTO: 4.32 K/UL
NEUTROPHILS NFR BLD AUTO: 48.1 %
NONHDLC SERPL-MCNC: 100 MG/DL
PLATELET # BLD AUTO: 321 K/UL
POTASSIUM SERPL-SCNC: 4.2 MMOL/L
PROT SERPL-MCNC: 7.3 G/DL
RBC # BLD: 4.25 M/UL
RBC # FLD: 12.6 %
SODIUM SERPL-SCNC: 140 MMOL/L
T4 FREE SERPL-MCNC: 1.1 NG/DL
TRIGL SERPL-MCNC: 122 MG/DL
TSH SERPL-ACNC: 7.81 UIU/ML
WBC # FLD AUTO: 9 K/UL

## 2023-09-15 PROCEDURE — 99214 OFFICE O/P EST MOD 30 MIN: CPT

## 2023-09-15 RX ORDER — LEVOTHYROXINE SODIUM 0.2 MG/1
200 TABLET ORAL DAILY
Qty: 90 | Refills: 1 | Status: DISCONTINUED | COMMUNITY
Start: 2023-02-27 | End: 2023-09-15

## 2023-09-15 RX ORDER — LEVOTHYROXINE SODIUM 0.05 MG/1
50 TABLET ORAL
Qty: 90 | Refills: 1 | Status: DISCONTINUED | COMMUNITY
Start: 2022-08-01 | End: 2023-09-15

## 2023-09-18 ENCOUNTER — TRANSCRIPTION ENCOUNTER (OUTPATIENT)
Age: 38
End: 2023-09-18

## 2023-09-20 ENCOUNTER — TRANSCRIPTION ENCOUNTER (OUTPATIENT)
Age: 38
End: 2023-09-20

## 2023-09-21 ENCOUNTER — TRANSCRIPTION ENCOUNTER (OUTPATIENT)
Age: 38
End: 2023-09-21

## 2023-09-22 ENCOUNTER — TRANSCRIPTION ENCOUNTER (OUTPATIENT)
Age: 38
End: 2023-09-22

## 2023-10-25 ENCOUNTER — APPOINTMENT (OUTPATIENT)
Dept: INTERNAL MEDICINE | Facility: CLINIC | Age: 38
End: 2023-10-25

## 2023-10-25 NOTE — OB RN PATIENT PROFILE - NSICDXFAMILYHX_GEN_ALL_CORE_FT
Attending Only
FAMILY HISTORY:  Father  Still living? Unknown  Family history of hypertension, Age at diagnosis: Age Unknown    Mother  Still living? Unknown  Family history of diabetes mellitus, Age at diagnosis: Age Unknown

## 2023-10-30 ENCOUNTER — LABORATORY RESULT (OUTPATIENT)
Age: 38
End: 2023-10-30

## 2023-10-30 ENCOUNTER — APPOINTMENT (OUTPATIENT)
Dept: INTERNAL MEDICINE | Facility: CLINIC | Age: 38
End: 2023-10-30
Payer: COMMERCIAL

## 2023-10-30 VITALS
BODY MASS INDEX: 37.92 KG/M2 | DIASTOLIC BLOOD PRESSURE: 83 MMHG | HEART RATE: 89 BPM | SYSTOLIC BLOOD PRESSURE: 126 MMHG | HEIGHT: 63 IN | TEMPERATURE: 98.3 F | OXYGEN SATURATION: 98 % | WEIGHT: 214 LBS

## 2023-10-30 DIAGNOSIS — Z87.898 PERSONAL HISTORY OF OTHER SPECIFIED CONDITIONS: ICD-10-CM

## 2023-10-30 PROCEDURE — 99214 OFFICE O/P EST MOD 30 MIN: CPT

## 2023-11-01 LAB
ALBUMIN SERPL ELPH-MCNC: 4.5 G/DL
ALP BLD-CCNC: 66 U/L
ALT SERPL-CCNC: 50 U/L
ANION GAP SERPL CALC-SCNC: 8 MMOL/L
AST SERPL-CCNC: 37 U/L
BILIRUB SERPL-MCNC: 0.2 MG/DL
BUN SERPL-MCNC: 14 MG/DL
CALCIUM SERPL-MCNC: 10 MG/DL
CHLORIDE SERPL-SCNC: 104 MMOL/L
CO2 SERPL-SCNC: 25 MMOL/L
CREAT SERPL-MCNC: 0.73 MG/DL
EGFR: 108 ML/MIN/1.73M2
GLUCOSE SERPL-MCNC: 96 MG/DL
POTASSIUM SERPL-SCNC: 5.9 MMOL/L
PROT SERPL-MCNC: 7.6 G/DL
SODIUM SERPL-SCNC: 138 MMOL/L
TSH SERPL-ACNC: 0.08 UIU/ML

## 2023-11-06 ENCOUNTER — TRANSCRIPTION ENCOUNTER (OUTPATIENT)
Age: 38
End: 2023-11-06

## 2023-11-30 ENCOUNTER — APPOINTMENT (OUTPATIENT)
Dept: INTERNAL MEDICINE | Facility: CLINIC | Age: 38
End: 2023-11-30
Payer: COMMERCIAL

## 2023-11-30 VITALS
HEART RATE: 80 BPM | HEIGHT: 63 IN | WEIGHT: 216 LBS | BODY MASS INDEX: 38.27 KG/M2 | TEMPERATURE: 97.5 F | OXYGEN SATURATION: 97 % | SYSTOLIC BLOOD PRESSURE: 104 MMHG | DIASTOLIC BLOOD PRESSURE: 70 MMHG

## 2023-11-30 PROCEDURE — 99213 OFFICE O/P EST LOW 20 MIN: CPT

## 2023-12-04 ENCOUNTER — TRANSCRIPTION ENCOUNTER (OUTPATIENT)
Age: 38
End: 2023-12-04

## 2023-12-06 ENCOUNTER — TRANSCRIPTION ENCOUNTER (OUTPATIENT)
Age: 38
End: 2023-12-06

## 2023-12-29 ENCOUNTER — TRANSCRIPTION ENCOUNTER (OUTPATIENT)
Age: 38
End: 2023-12-29

## 2024-01-03 ENCOUNTER — TRANSCRIPTION ENCOUNTER (OUTPATIENT)
Age: 39
End: 2024-01-03

## 2024-01-08 ENCOUNTER — TRANSCRIPTION ENCOUNTER (OUTPATIENT)
Age: 39
End: 2024-01-08

## 2024-01-09 ENCOUNTER — TRANSCRIPTION ENCOUNTER (OUTPATIENT)
Age: 39
End: 2024-01-09

## 2024-01-11 ENCOUNTER — TRANSCRIPTION ENCOUNTER (OUTPATIENT)
Age: 39
End: 2024-01-11

## 2024-01-22 ENCOUNTER — TRANSCRIPTION ENCOUNTER (OUTPATIENT)
Age: 39
End: 2024-01-22

## 2024-01-31 ENCOUNTER — TRANSCRIPTION ENCOUNTER (OUTPATIENT)
Age: 39
End: 2024-01-31

## 2024-02-01 ENCOUNTER — TRANSCRIPTION ENCOUNTER (OUTPATIENT)
Age: 39
End: 2024-02-01

## 2024-02-08 ENCOUNTER — LABORATORY RESULT (OUTPATIENT)
Age: 39
End: 2024-02-08

## 2024-02-08 ENCOUNTER — APPOINTMENT (OUTPATIENT)
Dept: INTERNAL MEDICINE | Facility: CLINIC | Age: 39
End: 2024-02-08
Payer: COMMERCIAL

## 2024-02-08 VITALS
OXYGEN SATURATION: 99 % | WEIGHT: 210 LBS | BODY MASS INDEX: 37.21 KG/M2 | HEART RATE: 81 BPM | HEIGHT: 63 IN | DIASTOLIC BLOOD PRESSURE: 78 MMHG | SYSTOLIC BLOOD PRESSURE: 120 MMHG | TEMPERATURE: 97.7 F

## 2024-02-08 DIAGNOSIS — E03.9 HYPOTHYROIDISM, UNSPECIFIED: ICD-10-CM

## 2024-02-08 PROCEDURE — 99395 PREV VISIT EST AGE 18-39: CPT

## 2024-02-08 NOTE — ASSESSMENT
[FreeTextEntry1] : Hypothyroidism: Taking levothyroxine sporadically, about 3-4 times/week.   Repeat TFTs today  Obesity, h/o GDM: Doing well with weight loss on Qsymia, but not approved to continue.  Appeal sent in as has been working for her with no side effects.   Hopes to get in more exercise, but timing is difficult working 2 jobs, over night and young kids at home.    HM: Cervical cancer screening - pap with gyn, UTD Dental UTD  tdap UTD 2022 Flu shot done  check labs  TB screen for work form

## 2024-02-08 NOTE — PHYSICAL EXAM
[No Acute Distress] : no acute distress [Well-Appearing] : well-appearing [Normal Voice/Communication] : normal voice/communication [No Carotid Bruits] : no carotid bruits [Pedal Pulses Present] : the pedal pulses are present [No Edema] : there was no peripheral edema [Grossly Normal Strength/Tone] : grossly normal strength/tone [Coordination Grossly Intact] : coordination grossly intact [No Focal Deficits] : no focal deficits [Normal Gait] : normal gait [Normal] : affect was normal and insight and judgment were intact

## 2024-02-08 NOTE — HISTORY OF PRESENT ILLNESS
[de-identified] : 38 y.o. female, PMHx hypothyroidism, epigastric pain, GDM, fibroids, year-round environmental allergies.   Presents or follow up.    Tried Wegovy x 4 weeks with no effect.  No noted side effects either.  Was previously taking Qsymia, which helped reduce appetite/snacking.   Has had some left over Qsymia (3.75-23), has been taking 2 tablets daily for the past 2 weeks.  Notes less appetite when at work and is able to eat just a healthy snack (works overnight).  Some days eats meals at home before work or on days off.  Qsymia PA remains pending.    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

## 2024-02-08 NOTE — HEALTH RISK ASSESSMENT
[No] : In the past 12 months have you used drugs other than those required for medical reasons? No [0] : 2) Feeling down, depressed, or hopeless: Not at all (0) [de-identified] : joined a gym, hopes to get to it  [[id number]] : 0 [Never] : Never

## 2024-02-09 ENCOUNTER — TRANSCRIPTION ENCOUNTER (OUTPATIENT)
Age: 39
End: 2024-02-09

## 2024-02-09 LAB
ANION GAP SERPL CALC-SCNC: 11 MMOL/L
BUN SERPL-MCNC: 12 MG/DL
CALCIUM SERPL-MCNC: 9.3 MG/DL
CHLORIDE SERPL-SCNC: 105 MMOL/L
CO2 SERPL-SCNC: 22 MMOL/L
CREAT SERPL-MCNC: 0.77 MG/DL
EGFR: 101 ML/MIN/1.73M2
GLUCOSE SERPL-MCNC: 86 MG/DL
POTASSIUM SERPL-SCNC: 4.7 MMOL/L
SODIUM SERPL-SCNC: 138 MMOL/L
TSH SERPL-ACNC: 15 UIU/ML

## 2024-02-12 ENCOUNTER — TRANSCRIPTION ENCOUNTER (OUTPATIENT)
Age: 39
End: 2024-02-12

## 2024-02-13 ENCOUNTER — TRANSCRIPTION ENCOUNTER (OUTPATIENT)
Age: 39
End: 2024-02-13

## 2024-02-13 LAB
M TB IFN-G BLD-IMP: NEGATIVE
QUANTIFERON TB PLUS MITOGEN MINUS NIL: >10 IU/ML
QUANTIFERON TB PLUS NIL: 0.02 IU/ML
QUANTIFERON TB PLUS TB1 MINUS NIL: 0.01 IU/ML
QUANTIFERON TB PLUS TB2 MINUS NIL: 0 IU/ML

## 2024-02-14 ENCOUNTER — TRANSCRIPTION ENCOUNTER (OUTPATIENT)
Age: 39
End: 2024-02-14

## 2024-02-28 ENCOUNTER — TRANSCRIPTION ENCOUNTER (OUTPATIENT)
Age: 39
End: 2024-02-28

## 2024-02-28 RX ORDER — SEMAGLUTIDE 1 MG/.5ML
1 INJECTION, SOLUTION SUBCUTANEOUS
Qty: 1 | Refills: 3 | Status: DISCONTINUED | COMMUNITY
Start: 2023-09-15 | End: 2024-02-28

## 2024-02-29 ENCOUNTER — RX RENEWAL (OUTPATIENT)
Age: 39
End: 2024-02-29

## 2024-02-29 RX ORDER — LEVOTHYROXINE SODIUM 0.3 MG/1
300 TABLET ORAL DAILY
Qty: 90 | Refills: 1 | Status: ACTIVE | COMMUNITY
Start: 2023-09-15 | End: 1900-01-01

## 2024-04-24 ENCOUNTER — TRANSCRIPTION ENCOUNTER (OUTPATIENT)
Age: 39
End: 2024-04-24

## 2024-05-29 NOTE — DISCHARGE NOTE OB - PATIENT PORTAL LINK FT
Ambulatory with cane/crutches/walker You can access the FollowMyHealth Patient Portal offered by [facility] by registering at the following website: [email or web address]. By joining [facility]’s FollowMyHealth portal, you will also be able to view your health information using other applications (apps) compatible with our system.

## 2024-06-11 ENCOUNTER — TRANSCRIPTION ENCOUNTER (OUTPATIENT)
Age: 39
End: 2024-06-11

## 2024-06-12 ENCOUNTER — APPOINTMENT (OUTPATIENT)
Dept: INTERNAL MEDICINE | Facility: CLINIC | Age: 39
End: 2024-06-12
Payer: COMMERCIAL

## 2024-06-12 DIAGNOSIS — E66.9 OBESITY, UNSPECIFIED: ICD-10-CM

## 2024-06-12 DIAGNOSIS — O24.419 GESTATIONAL DIABETES MELLITUS IN PREGNANCY, UNSPECIFIED CONTROL: ICD-10-CM

## 2024-06-12 DIAGNOSIS — Z64.1 PROBLEMS RELATED TO MULTIPARITY: ICD-10-CM

## 2024-06-12 PROCEDURE — 99441: CPT

## 2024-06-14 ENCOUNTER — TRANSCRIPTION ENCOUNTER (OUTPATIENT)
Age: 39
End: 2024-06-14

## 2024-06-14 RX ORDER — PHENTERMINE AND TOPIRAMATE 15; 92 MG/1; MG/1
15-92 CAPSULE, EXTENDED RELEASE ORAL
Qty: 30 | Refills: 1 | Status: ACTIVE | COMMUNITY
Start: 2023-09-20 | End: 1900-01-01

## 2024-07-24 ENCOUNTER — TRANSCRIPTION ENCOUNTER (OUTPATIENT)
Age: 39
End: 2024-07-24

## 2024-08-02 ENCOUNTER — TRANSCRIPTION ENCOUNTER (OUTPATIENT)
Age: 39
End: 2024-08-02

## 2024-08-02 ENCOUNTER — NON-APPOINTMENT (OUTPATIENT)
Age: 39
End: 2024-08-02

## 2024-08-05 ENCOUNTER — TRANSCRIPTION ENCOUNTER (OUTPATIENT)
Age: 39
End: 2024-08-05

## 2024-08-27 ENCOUNTER — RX RENEWAL (OUTPATIENT)
Age: 39
End: 2024-08-27

## 2025-02-03 ENCOUNTER — RX RENEWAL (OUTPATIENT)
Age: 40
End: 2025-02-03

## 2025-02-03 ENCOUNTER — TRANSCRIPTION ENCOUNTER (OUTPATIENT)
Age: 40
End: 2025-02-03

## 2025-02-04 ENCOUNTER — TRANSCRIPTION ENCOUNTER (OUTPATIENT)
Age: 40
End: 2025-02-04

## 2025-02-18 ENCOUNTER — RX RENEWAL (OUTPATIENT)
Age: 40
End: 2025-02-18

## 2025-02-20 ENCOUNTER — APPOINTMENT (OUTPATIENT)
Dept: INTERNAL MEDICINE | Facility: CLINIC | Age: 40
End: 2025-02-20
Payer: COMMERCIAL

## 2025-02-20 VITALS
HEIGHT: 63 IN | WEIGHT: 200 LBS | TEMPERATURE: 98 F | OXYGEN SATURATION: 98 % | HEART RATE: 82 BPM | SYSTOLIC BLOOD PRESSURE: 117 MMHG | DIASTOLIC BLOOD PRESSURE: 79 MMHG | BODY MASS INDEX: 35.44 KG/M2

## 2025-02-20 DIAGNOSIS — J30.2 OTHER SEASONAL ALLERGIC RHINITIS: ICD-10-CM

## 2025-02-20 DIAGNOSIS — E03.9 HYPOTHYROIDISM, UNSPECIFIED: ICD-10-CM

## 2025-02-20 DIAGNOSIS — E66.9 OBESITY, UNSPECIFIED: ICD-10-CM

## 2025-02-20 DIAGNOSIS — Z86.018 PERSONAL HISTORY OF OTHER BENIGN NEOPLASM: ICD-10-CM

## 2025-02-20 DIAGNOSIS — E04.1 NONTOXIC SINGLE THYROID NODULE: ICD-10-CM

## 2025-02-20 DIAGNOSIS — Z00.00 ENCOUNTER FOR GENERAL ADULT MEDICAL EXAMINATION W/OUT ABNORMAL FINDINGS: ICD-10-CM

## 2025-02-20 PROCEDURE — 36415 COLL VENOUS BLD VENIPUNCTURE: CPT

## 2025-02-20 PROCEDURE — 99395 PREV VISIT EST AGE 18-39: CPT

## 2025-02-20 RX ORDER — TIRZEPATIDE 2.5 MG/.5ML
2.5 INJECTION, SOLUTION SUBCUTANEOUS
Qty: 1 | Refills: 3 | Status: ACTIVE | COMMUNITY
Start: 2025-02-20 | End: 1900-01-01

## 2025-02-26 ENCOUNTER — LABORATORY RESULT (OUTPATIENT)
Age: 40
End: 2025-02-26

## 2025-02-26 DIAGNOSIS — R79.89 OTHER SPECIFIED ABNORMAL FINDINGS OF BLOOD CHEMISTRY: ICD-10-CM

## 2025-02-27 LAB — HCG SERPL-MCNC: <1 MIU/ML

## 2025-02-28 DIAGNOSIS — E55.9 VITAMIN D DEFICIENCY, UNSPECIFIED: ICD-10-CM

## 2025-02-28 LAB
25(OH)D3 SERPL-MCNC: 9.7 NG/ML
ALBUMIN SERPL ELPH-MCNC: 4 G/DL
ALP BLD-CCNC: 75 U/L
ALT SERPL-CCNC: 19 U/L
ANION GAP SERPL CALC-SCNC: 13 MMOL/L
AST SERPL-CCNC: 13 U/L
BASOPHILS # BLD AUTO: 0.08 K/UL
BASOPHILS NFR BLD AUTO: 0.6 %
BILIRUB SERPL-MCNC: 0.3 MG/DL
BUN SERPL-MCNC: 17 MG/DL
CALCIUM SERPL-MCNC: 9.5 MG/DL
CHLORIDE SERPL-SCNC: 104 MMOL/L
CHOLEST SERPL-MCNC: 121 MG/DL
CO2 SERPL-SCNC: 21 MMOL/L
CREAT SERPL-MCNC: 0.87 MG/DL
EGFR: 87 ML/MIN/1.73M2
EOSINOPHIL # BLD AUTO: 0.13 K/UL
EOSINOPHIL NFR BLD AUTO: 0.9 %
ESTIMATED AVERAGE GLUCOSE: 123 MG/DL
FERRITIN SERPL-MCNC: 176 NG/ML
FOLATE SERPL-MCNC: 4.9 NG/ML
GLUCOSE SERPL-MCNC: 103 MG/DL
HBA1C MFR BLD HPLC: 5.9 %
HCT VFR BLD CALC: 36.4 %
HDLC SERPL-MCNC: 45 MG/DL
HGB BLD-MCNC: 12.2 G/DL
IMM GRANULOCYTES NFR BLD AUTO: 0.5 %
IRON SATN MFR SERPL: 4 %
IRON SERPL-MCNC: 13 UG/DL
LDLC SERPL CALC-MCNC: 62 MG/DL
LYMPHOCYTES # BLD AUTO: 2.56 K/UL
LYMPHOCYTES NFR BLD AUTO: 18.1 %
MAN DIFF?: NORMAL
MCHC RBC-ENTMCNC: 28.8 PG
MCHC RBC-ENTMCNC: 33.5 G/DL
MCV RBC AUTO: 86.1 FL
MONOCYTES # BLD AUTO: 1.31 K/UL
MONOCYTES NFR BLD AUTO: 9.3 %
NEUTROPHILS # BLD AUTO: 9.98 K/UL
NEUTROPHILS NFR BLD AUTO: 70.6 %
NONHDLC SERPL-MCNC: 76 MG/DL
PLATELET # BLD AUTO: 319 K/UL
POTASSIUM SERPL-SCNC: 4.3 MMOL/L
PROT SERPL-MCNC: 7.2 G/DL
RBC # BLD: 4.23 M/UL
RBC # FLD: 13.3 %
SODIUM SERPL-SCNC: 138 MMOL/L
THYROGLOB AB SERPL-ACNC: 51.3 IU/ML
THYROPEROXIDASE AB SERPL IA-ACNC: 48.4 IU/ML
TIBC SERPL-MCNC: 298 UG/DL
TRANSFERRIN SERPL-MCNC: 238 MG/DL
TRIGL SERPL-MCNC: 66 MG/DL
TSH SERPL-ACNC: 0.14 UIU/ML
UIBC SERPL-MCNC: 285 UG/DL
VIT B12 SERPL-MCNC: 314 PG/ML
WBC # FLD AUTO: 14.13 K/UL

## 2025-02-28 RX ORDER — LEVOTHYROXINE SODIUM 0.2 MG/1
200 TABLET ORAL DAILY
Qty: 90 | Refills: 0 | Status: ACTIVE | COMMUNITY
Start: 2025-02-28 | End: 1900-01-01

## 2025-03-03 RX ORDER — CHOLECALCIFEROL (VITAMIN D3) 1250 MCG
1.25 MG CAPSULE ORAL
Qty: 8 | Refills: 0 | Status: ACTIVE | COMMUNITY
Start: 2025-02-28 | End: 1900-01-01

## 2025-03-15 LAB
M TB IFN-G BLD-IMP: NEGATIVE
QUANTIFERON TB PLUS MITOGEN MINUS NIL: >10 IU/ML
QUANTIFERON TB PLUS NIL: 0.02 IU/ML
QUANTIFERON TB PLUS TB1 MINUS NIL: 0.01 IU/ML
QUANTIFERON TB PLUS TB2 MINUS NIL: 0.01 IU/ML

## 2025-03-31 ENCOUNTER — RX RENEWAL (OUTPATIENT)
Age: 40
End: 2025-03-31

## 2025-04-04 ENCOUNTER — RX RENEWAL (OUTPATIENT)
Age: 40
End: 2025-04-04

## 2025-04-04 RX ORDER — TIRZEPATIDE 2.5 MG/.5ML
2.5 INJECTION, SOLUTION SUBCUTANEOUS
Qty: 4 | Refills: 0 | Status: ACTIVE | COMMUNITY
Start: 2025-04-04 | End: 1900-01-01

## 2025-04-07 DIAGNOSIS — R14.2 ERUCTATION: ICD-10-CM

## 2025-04-07 RX ORDER — CALCIUM CARBONATE/SIMETHICONE 500-125 MG
500-125 TABLET,CHEWABLE ORAL
Qty: 30 | Refills: 0 | Status: ACTIVE | COMMUNITY
Start: 2025-04-07 | End: 1900-01-01

## 2025-04-16 ENCOUNTER — APPOINTMENT (OUTPATIENT)
Dept: INTERNAL MEDICINE | Facility: CLINIC | Age: 40
End: 2025-04-16
Payer: COMMERCIAL

## 2025-04-16 VITALS
BODY MASS INDEX: 34.55 KG/M2 | DIASTOLIC BLOOD PRESSURE: 81 MMHG | SYSTOLIC BLOOD PRESSURE: 125 MMHG | TEMPERATURE: 97.8 F | WEIGHT: 195 LBS | HEART RATE: 76 BPM | OXYGEN SATURATION: 98 % | HEIGHT: 63 IN

## 2025-04-16 DIAGNOSIS — E03.9 HYPOTHYROIDISM, UNSPECIFIED: ICD-10-CM

## 2025-04-16 DIAGNOSIS — R79.89 OTHER SPECIFIED ABNORMAL FINDINGS OF BLOOD CHEMISTRY: ICD-10-CM

## 2025-04-16 DIAGNOSIS — E66.9 OBESITY, UNSPECIFIED: ICD-10-CM

## 2025-04-16 PROCEDURE — 99213 OFFICE O/P EST LOW 20 MIN: CPT

## 2025-05-22 ENCOUNTER — APPOINTMENT (OUTPATIENT)
Dept: INTERNAL MEDICINE | Facility: CLINIC | Age: 40
End: 2025-05-22
Payer: COMMERCIAL

## 2025-05-22 VITALS — HEIGHT: 63 IN | WEIGHT: 197 LBS | BODY MASS INDEX: 34.91 KG/M2

## 2025-05-22 DIAGNOSIS — E66.9 OBESITY, UNSPECIFIED: ICD-10-CM

## 2025-05-22 PROCEDURE — 99213 OFFICE O/P EST LOW 20 MIN: CPT | Mod: 95

## 2025-05-30 ENCOUNTER — RX RENEWAL (OUTPATIENT)
Age: 40
End: 2025-05-30

## 2025-06-06 ENCOUNTER — RX RENEWAL (OUTPATIENT)
Age: 40
End: 2025-06-06

## 2025-06-17 ENCOUNTER — APPOINTMENT (OUTPATIENT)
Dept: INTERNAL MEDICINE | Facility: CLINIC | Age: 40
End: 2025-06-17
Payer: COMMERCIAL

## 2025-06-17 VITALS — BODY MASS INDEX: 35.08 KG/M2 | WEIGHT: 198 LBS | HEIGHT: 63 IN

## 2025-06-17 PROCEDURE — 99213 OFFICE O/P EST LOW 20 MIN: CPT | Mod: 95

## 2025-07-17 ENCOUNTER — APPOINTMENT (OUTPATIENT)
Dept: INTERNAL MEDICINE | Facility: CLINIC | Age: 40
End: 2025-07-17
Payer: COMMERCIAL

## 2025-07-17 VITALS — BODY MASS INDEX: 34.37 KG/M2 | WEIGHT: 194 LBS

## 2025-07-17 PROCEDURE — 99214 OFFICE O/P EST MOD 30 MIN: CPT | Mod: 95

## 2025-07-22 RX ORDER — TIRZEPATIDE 7.5 MG/.5ML
7.5 INJECTION, SOLUTION SUBCUTANEOUS
Qty: 2 | Refills: 0 | Status: COMPLETED | COMMUNITY
Start: 2025-05-23

## 2025-07-22 RX ORDER — TIRZEPATIDE 5 MG/.5ML
5 INJECTION, SOLUTION SUBCUTANEOUS
Qty: 2 | Refills: 0 | Status: COMPLETED | COMMUNITY
Start: 2025-04-16

## 2025-07-22 RX ORDER — TIRZEPATIDE 2.5 MG/.5ML
2.5 INJECTION, SOLUTION SUBCUTANEOUS
Qty: 2 | Refills: 0 | Status: COMPLETED | COMMUNITY
Start: 2025-04-07

## 2025-07-28 ENCOUNTER — TRANSCRIPTION ENCOUNTER (OUTPATIENT)
Age: 40
End: 2025-07-28

## 2025-07-28 DIAGNOSIS — J30.2 OTHER SEASONAL ALLERGIC RHINITIS: ICD-10-CM

## 2025-07-28 DIAGNOSIS — H10.10 ACUTE ATOPIC CONJUNCTIVITIS, UNSPECIFIED EYE: ICD-10-CM

## 2025-07-28 DIAGNOSIS — E66.9 OBESITY, UNSPECIFIED: ICD-10-CM

## 2025-07-28 DIAGNOSIS — E03.9 HYPOTHYROIDISM, UNSPECIFIED: ICD-10-CM

## 2025-07-28 RX ORDER — LORATADINE 10 MG/1
10 TABLET ORAL
Qty: 1 | Refills: 0 | Status: ACTIVE | COMMUNITY
Start: 2025-07-28 | End: 1900-01-01

## 2025-08-01 ENCOUNTER — TRANSCRIPTION ENCOUNTER (OUTPATIENT)
Age: 40
End: 2025-08-01

## 2025-08-01 LAB
25(OH)D3 SERPL-MCNC: 28.8 NG/ML
FOLATE SERPL-MCNC: 10.5 NG/ML
SELENIUM SERPL-MCNC: 132 UG/L
T4 FREE SERPL-MCNC: 1.3 NG/DL
TSH SERPL-ACNC: 4.98 UIU/ML
VIT B12 SERPL-MCNC: 340 PG/ML
VIT C SERPL-MCNC: 0.4 MG/DL

## 2025-08-26 ENCOUNTER — TRANSCRIPTION ENCOUNTER (OUTPATIENT)
Age: 40
End: 2025-08-26

## 2025-09-05 ENCOUNTER — RX RENEWAL (OUTPATIENT)
Age: 40
End: 2025-09-05